# Patient Record
Sex: FEMALE | Race: WHITE | NOT HISPANIC OR LATINO | Employment: PART TIME | URBAN - METROPOLITAN AREA
[De-identification: names, ages, dates, MRNs, and addresses within clinical notes are randomized per-mention and may not be internally consistent; named-entity substitution may affect disease eponyms.]

---

## 2017-01-03 ENCOUNTER — HOSPITAL ENCOUNTER (OUTPATIENT)
Dept: PHYSICAL THERAPY | Facility: HOSPITAL | Age: 22
Setting detail: THERAPIES SERIES
Discharge: HOME | End: 2017-01-03

## 2017-01-03 DIAGNOSIS — R26.9 ABNORMAL GAIT: Primary | ICD-10-CM

## 2017-01-03 PROCEDURE — 97112 NEUROMUSCULAR REEDUCATION: CPT

## 2017-01-03 NOTE — PROGRESS NOTES
Outpatient Physical Therapy Neuro Treatment Note  [facility]     Patient Name: Juli Luna  : 1995  MRN: [id number]  Today's Date: 1/3/2017      Visit Date: 2017    Visit Dx:    ICD-10-CM ICD-9-CM   1. 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 Ramirez about driving at my next appointment.  -EF      Precautions and Contraindications    Precautions/Limitations fall precautions;swallowing precautions  -EF      Precautions falls, PEG  -EF      Subjective Pain    Able to rate subjective pain? yes  -EF      Pre-Treatment Pain Level 0  -EF      Post-Treatment Pain Level 0  -EF      Transfers    Transfers, Sit-Stand [place] conditional independence  -EF      Transfers, Stand-Sit [place] conditional independence  -EF      Gait Assessment/Treatment    Gait, [place] Level conditional independence  -EF      Gait, Distance (Feet) 200   x 1, 80 x 2  -EF      Gait, Gait Deviations ataxia   decreased trunk rotation, decreased arm swing  -EF      Gait, Safety Issues weight-shifting ability decreased  -EF      Gait, Impairments strength decreased;impaired balance;coordination impaired;motor control impaired  -EF      Balance Skills Training    Standing-Level of Assistance Close supervision  -EF      Static Standing Balance Support Left upper extremity supported;oscar bar  -EF      Standing-Balance Activities Single Limb Stance;Tandem Stance  -EF      Gait Balance-Level of Assistance Close supervision;Contact guard  -EF      Gait Balance Support No upper extremity supported  -EF      Gait Balance Activities  backwards;scanning environment R/L;side-stepping;stepping over object;tandem   crossovers/braiding with cga with UE support  -EF        User Key  (r) = Recorded By, (t) = Taken By, (c) = Cosigned By    Initials Name Provider Type    REZA Marshall, PT Physical Therapist                        PT Assessment/Plan       01/03/17 1559          PT Assessment    Assessment Comments Pt demonstrates fewer ataxic movements & continues to be challenged by increasingly difficult balance and coordination exercises.  -EF      PT Plan    PT Frequency 1x/week;2x/week  -EF      PT Plan Comments Continue with Plan of Care.  -EF        User Key  (r) = Recorded By, (t) = Taken By, (c) = Cosigned By    Initials Name Provider Type    REZA Marshall, PT Physical Therapist                     Exercises       01/03/17 3286          Subjective Comments    Subjective Comments I am going to ask Dr. Ramirez about driving at my next appointment.  -EF      Subjective Pain    Able to rate subjective pain? yes  -EF      Pre-Treatment Pain Level 0  -EF      Post-Treatment Pain Level 0  -EF      Exercise 1    Exercise Name 1 squat walking ~160'  -EF      Exercise 2    Exercise Name 2 step ups on curb with UE support  -EF      Reps 2 20  -EF      Exercise 7    Exercise Name 7 alt touches on step with sba  -EF      Reps 7 20  -EF      Exercise 8    Exercise Name 8 tandem walking at hemibars with sba  -EF      Exercise 10    Exercise Name 10 Jumps in squat position with cga/sba  -EF      Reps 10 15  -EF      Exercise 11    Exercise Name 11 dribbling green ball wile walking & switching hands  -EF      Reps 11 2   laps around gym with sba  -EF      Exercise 12    Exercise Name 12 Walking on heels & toes at hemibars with sba  -EF      Reps 12 --   2 laps each  -EF      Exercise 13    Exercise Name 13 alt fwd/back jumps (ant/post jumping jacks) with cg/sba  -EF      Reps 13 10  -EF      Exercise 16    Exercise Name 16 slow marching ~80' with  sba  -EF      Exercise 17    Exercise Name 17 12 o'clock, 3, 6 touches with each foot at hemibars  -EF      Exercise 18    Exercise Name 18 ball toss/catch against wall,same while stepping to L & R  -EF      Reps 18 25  -EF        User Key  (r) = Recorded By, (t) = Taken By, (c) = Cosigned By    Initials Name Provider Type    REZA Marshall PT Physical Therapist                   Balance Exercises (last 24 hours)      Balance Exercises       01/03/17 1532          Lunges    Activity Forward  -EF      Sets 5  -EF        User Key  (r) = Recorded By, (t) = Taken By, (c) = Cosigned By    Initials Name Provider Type    REZA Marshall PT Physical Therapist                        Therapy Education       01/03/17 1559    Therapy Education    Given Posture/body mechanics;Fall prevention and home safety;Mobility training  -EF    Program Progressed  -EF    How Provided Verbal;Demonstration  -EF    Provided to Patient  -EF    Level of Understanding Teach back education performed;Verbalized;Demonstrated  -EF      User Key  (r) = Recorded By, (t) = Taken By, (c) = Cosigned By    Initials Name Provider Type    REZA Marshall PT Physical Therapist                Time Calculation:   Start Time: 1015  Stop Time: 1100  Time Calculation (min): 45 min     Therapy Charges for Today     Code Description Service Date Service Provider Modifiers Qty    [id number]  PT NEUROMUSC RE EDUCATION EA 15 MIN 1/3/2017 Shanna Marshall, PT GP 3                    Shanna Marshall PT  1/3/2017

## 2017-01-05 ENCOUNTER — HOSPITAL ENCOUNTER (OUTPATIENT)
Dept: OCCUPATIONAL THERAPY | Facility: HOSPITAL | Age: 22
Setting detail: THERAPIES SERIES
Discharge: HOME | End: 2017-01-05

## 2017-01-05 ENCOUNTER — HOSPITAL ENCOUNTER (OUTPATIENT)
Dept: SPEECH THERAPY | Facility: HOSPITAL | Age: 22
Setting detail: THERAPIES SERIES
Discharge: HOME | End: 2017-01-05

## 2017-01-05 DIAGNOSIS — R47.1 DYSARTHRIA: ICD-10-CM

## 2017-01-05 DIAGNOSIS — R27.8 COORDINATION IMPAIRMENTS: Primary | ICD-10-CM

## 2017-01-05 DIAGNOSIS — R53.1 DECREASED STRENGTH: ICD-10-CM

## 2017-01-05 DIAGNOSIS — R13.12 OROPHARYNGEAL DYSPHAGIA: Primary | ICD-10-CM

## 2017-01-05 DIAGNOSIS — Z86.79 HISTORY OF INTRACRANIAL HEMORRHAGE: ICD-10-CM

## 2017-01-05 PROCEDURE — 92526 ORAL FUNCTION THERAPY: CPT

## 2017-01-05 PROCEDURE — 97110 THERAPEUTIC EXERCISES: CPT | Performed by: OCCUPATIONAL THERAPIST

## 2017-01-05 PROCEDURE — 92507 TX SP LANG VOICE COMM INDIV: CPT

## 2017-01-05 NOTE — PROGRESS NOTES
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 Oropharyngeal dysphagia R13.12 787.22   2. Dysarthria R47.1 784.51           SLP OP Goals       17 1005       Subjective Comments Juli is highly motivated to participate in therapy.   -HS       Able to rate subjective pain? yes  -HS    Pre-Treatment Pain Level 0  -HS    Post-Treatment Pain Level 0  -HS       Patient will apply compensatory strategies to improve intelligibility of speech during in spontaneous speech 90%:;without cues  -HS    Status: Patient will apply compensatory strategies to improve intelligibility of speech during in spontaneous speech Progressing as expected   Partially met  -HS    Comments: Patient will apply compensatory strategies to improve intelligibility of speech during in spontaneous speech 90% without cues during simple conversational speech. Better rate control this session. Goal criteria met x1 session. Will continue goal for carryover/generalization.  -HS       Dysphagia LTG's     Status: Patient will maintain nutrition/hydration via alternative means Achieved  -HS    Comments: Patient will maintain nutrition/hydration via alternative means Achieved 16.   -HS    Pt will tolerate some PO diet w/o aspiration to adequately meet nutrition/hydration needs Pureed/Finely Chopped Foods with Honey Thick Liquids  -HS    Status: Pt will tolerate some PO diet w/o aspiration to adequately meet nutrition/hydration needs Progressing as expected  -HS     Comments: Pt will tolerate some PO diet w/o aspiration to adequately meet nutrition/hydration needs     Status: Patient will increase hydration by tolerating water between meals after oral care     Comments: Patient will increase hydration by tolerating water between meals after oral care     Dysphagia STG's     Patient will improve oral skills to enhance safety and increase eating efficiency by increasing accuracy of A-P tongue movements without cues   100%  -HS    Status: Patient will improve oral skills to enhance safety and increase eating efficiency by increasing accuracy of A-P tongue movements Progressing as expected  -HS    Comments: Patient will improve oral skills to enhance safety and increase eating efficiency by increasing accuracy of A-P tongue movements 95% without cues.  -HS    Patient will increase laryngeal elevation to reduce residue that might fall into airway by completing Mendelsohn maneuver;without cues   100%  -HS    Status: Patient will increase laryngeal elevation to reduce residue that might fall into airway by completing Progressing as expected  -HS    Comments: Patient will increase laryngeal elevation to reduce residue that might fall into airway by completing 90% on Mendelsohn maneuver without cues.   -HS    Patient will increase strength of tongue base and posterior pharyngeal walls to reduce residue that might fall into airway by completing Liza (tongue hold)   100%  -HS    Status: Patient will increase strength of tongue base and posterior pharyngeal walls to reduce residue that might fall into airway by completing Progressing as expected  -HS    Comments: Patient will increase strength of tongue base and posterior pharyngeal walls to reduce residue that might fall into airway by completing 95% on Liza without cues.  -HS    Status: Patient will increase superior/anterior movement of hyolaryngeal complex to reduce residue which may fall into airway by using the Expiratory Muscle  Strength Trainer Progressing as expected  -HS    Comments: Patient will increase superior/anterior movement of hyolaryngeal complex to reduce residue which may fall into airway by using the Expiratory Muscle Strength Trainer Patient completes via HEP with no complaints. Did not target during session this date.   -HS    Patient will increase tipping of arytenoids and closure at entrance to the airway to reduce penetration into the vestibule by completing super-supraglottic swallow;without cues   100%  -HS    Status: Patient will increase tipping of arytenoids and closure at entrance to the airway to reduce penetration into the vestibule by completing Progressing as expected  -HS    Comments: Patient will increase tipping of arytenoids and closure at entrance to the airway to reduce penetration into the vestibule by completing 90% without cues.   -HS    Patient will compensate for oral/pharyngeal deficits and reduce risks while eating by utilizing  compensatory strategies no straw;multiple swallows;without cues  -HS    Status: Patient will compensate for oral/pharyngeal deficits and reduce risks while eating by utilizing  compensatory strategies --   Did not complete PO trials this date.   -HS    Comments: Patient will compensate for oral/pharyngeal deficits and reduce risks while eating by utilizing  compensatory strategies Patient reports compliance with safe swallow strategies at home.   -HS       Other Adult Goal- 1 Patient will increase superior/anterior movement of the hyolaryngeal complex to improve swallow safety by triggering swallow at peak of amplitude on 100% of opportunities during neuromuscular electrical stimulation (NMES).  -HS    Status: Other Adult Goal- 1 Progressing as expected  -HS    Comments: Other Adult Goal- 1 Parameters: 50 Hz, 175 phase duration, 12 amps. Patient tolerated well for 30 minutes. Triggered swallow at peak of amplitude with 90% accuracy independently.     -HS      User Key  (r)  = Recorded By, (t) = Taken By, (c) = Cosigned By    Initials Name Provider Type    TRAY Bright MS CCC-SLP Speech and Language Pathologist              OP SLP Education       01/05/17 1005       Barriers to Learning No barriers identified  -HS    Education Provided --   Discussed PO trials at home. Patient is making appropriate choices for her trials.   -HS    Learning Motivation Strong  -HS    Learning Method Explanation  -HS    Teaching Response Verbalized understanding;Demonstrated understanding  -HS      User Key  (r) = Recorded By, (t) = Taken By, (c) = Cosigned By    Initials Name Effective Dates    TRAY Bright MS CCC-SLP 04/13/15 -               OP SLP Assessment/Plan - 01/05/17 1005     SLP Plan    Plan Comments Continue therapy.   -HS      User Key  (r) = Recorded By, (t) = Taken By, (c) = Cosigned By    Initials Name Provider Type    TRAY Bright MS CCC-SLP Speech and Language Pathologist        Time Calculation:   SLP Start Time: 0920 (Arrived late due to weather. )  SLP Stop Time: 1005  SLP Time Calculation (min): 45 min    Therapy Charges for Today     Code Description Service Date Service Provider Modifiers Qty    98947685474 HC ST TREATMENT SWALLOW 2 1/5/2017 [place] MS Kaila CCC-SLP 59, GN 1    [id number] HC ST TREATMENT SPEECH 1 1/5/2017 [place] Bright MS CCC-SLP 59, GN 1            [place] Bright MS CCC-SLP  1/5/2017

## 2017-01-05 NOTE — PROGRESS NOTES
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   • H/O craniotomy   • Episode of syncope   • Iron deficiency anemia due to chronic blood loss        Past Medical History   Diagnosis Date   • AVM (arteriovenous malformation)    • History of pneumonia         Past Surgical History   Procedure Laterality Date   • Endoscopy w/ peg tube placement N/A 9/3/2016     Procedure: ESOPHAGOGASTRODUODENOSCOPY WITH PERCUTANEOUS ENDOSCOPIC GASTROSTOMY TUBE INSERTION;  Surgeon: René Ritter MD;  Location: [facility] ENDOSCOPY;  Service:    • Craniotomy for tumor Left 2016     Procedure: Posterior fossa suboccipital craniotomy and removal of brain stem arteriovenous malformation;  Surgeon: Josias Regalado MD;  Location: [facility] OR;  Service:    • Tracheostomy N/A 2016     Procedure: TRACHEOSTOMY;  Surgeon: Chad Negron MD;  Location: [facility] OR;  Service:          Visit Dx:    ICD-10-CM ICD-9-CM   1. Coordination impairments R27.8 781.3   2. History of intracranial hemorrhage Z86.79 V12.59   3. Decreased strength M62.81 728.87                         OT Assessment/Plan       17 1438          OT Assessment    Assessment Comments Pt cont to increase endurance and strength for work related tasks to possibly return to work in a few months. She does demo some coordination impairments in the RUE with heavier overhead objects.  -SO        User Key  (r) = Recorded By, (t) = Taken By, (c) = Cosigned By    Initials Name Provider Type    SO Karen Gamble, OTR Occupational Therapist                    Therapy Education       17 3085    Therapy Education    Given Posture/body  mechanics;Fall prevention and home safety;Mobility training  -EF    Program Progressed  -EF    How Provided Verbal;Demonstration  -EF    Provided to Patient  -EF    Level of Understanding Teach back education performed;Verbalized;Demonstrated  -EF      User Key  (r) = Recorded By, (t) = Taken By, (c) = Cosigned By    Initials Name Provider Type    REZA Marshall, PT Physical Therapist                    OT Exercises       01/05/17 1400          Subjective Comments    Subjective Comments Pt reports that the sensation in her RUE from last visit has resolved. No new concerns.  -SO      Subjective Pain    Able to rate subjective pain? yes  -SO      Pre-Treatment Pain Level 0  -SO      Post-Treatment Pain Level 0  -SO      Exercise 1    Exercise Name 1 Pt performed work simulated task that involved intermittently carrying 20# accross room for 10 min. Pt was required to reach for 2-8# objects in high cabnients. Pt with no LOB and reports little fatigue. Also performed dynamic standing task that required overhead and low reaching tasks for 15 min.  -SO      Time (Minutes) 1 25  -SO      Exercise 2    Exercise Name 2 UE strengthening using 5# db. Lateral shoulder raise, front shoulder raise, and overhead shoulder press. Some decreased coordination with RUE with 5# DB overhead. Cues for slow controlled movement.  -SO      Weights/Plates 2 5  -SO      Sets 2 2  -SO      Reps 2 10  -SO        User Key  (r) = Recorded By, (t) = Taken By, (c) = Cosigned By    Initials Name Provider Type    SO RONEN Flores Occupational Therapist                    Time Calculation:   OT Start Time: 1012  OT Stop Time: 1101  OT Time Calculation (min): 49 min     Therapy Charges for Today     Code Description Service Date Service Provider Modifiers Qty    [id number]  OT THER PROC EA 15 MIN 1/5/2017 RONEN Flores GO 3                    RONEN Flores  1/5/2017

## 2017-01-10 ENCOUNTER — HOSPITAL ENCOUNTER (OUTPATIENT)
Dept: SPEECH THERAPY | Facility: HOSPITAL | Age: 22
Setting detail: THERAPIES SERIES
Discharge: HOME | End: 2017-01-10

## 2017-01-10 ENCOUNTER — HOSPITAL ENCOUNTER (OUTPATIENT)
Dept: PHYSICAL THERAPY | Facility: HOSPITAL | Age: 22
Setting detail: THERAPIES SERIES
Discharge: HOME | End: 2017-01-10

## 2017-01-10 DIAGNOSIS — R26.9 ABNORMAL GAIT: Primary | ICD-10-CM

## 2017-01-10 DIAGNOSIS — R47.1 DYSARTHRIA: ICD-10-CM

## 2017-01-10 DIAGNOSIS — R13.12 OROPHARYNGEAL DYSPHAGIA: Primary | ICD-10-CM

## 2017-01-10 PROCEDURE — 97112 NEUROMUSCULAR REEDUCATION: CPT

## 2017-01-10 PROCEDURE — 92526 ORAL FUNCTION THERAPY: CPT

## 2017-01-10 PROCEDURE — 92507 TX SP LANG VOICE COMM INDIV: CPT

## 2017-01-10 NOTE — PROGRESS NOTES
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 Oropharyngeal dysphagia R13.12 787.22   2. Dysarthria R47.1 784.51           SLP OP Goals       01/10/17 1000       Subjective Comments Juli was pleasant and cooperative. She is highly motivated to participate in therapy. She reports a follow up appointment with her MD on 17 and states that she is looking forward to asking him about driving.   -HS       Able to rate subjective pain? yes  -HS    Pre-Treatment Pain Level 0  -HS    Post-Treatment Pain Level 0  -HS       Patient will apply compensatory strategies to improve intelligibility of speech during in spontaneous speech 90%:;without cues  -HS    Status: Patient will apply compensatory strategies to improve intelligibility of speech during in spontaneous speech Progressing as expected  -HS    Comments: Patient will apply compensatory strategies to improve intelligibility of speech during in spontaneous speech 90% given intermittent cues during phrase/short sentence production with open vowels and nasal/non-nasal sounds utilizing exaggerated mouth opening, increased loudness, and decreased rate. Patient states that her brother has commented recently about improvements in her speech. She does not that unfamiliar listeners have some difficulty understanding her.   -HS       Status: Patient will maintain nutrition/hydration via alternative means     Comments: Patient will maintain nutrition/hydration via alternative means     Pt  will tolerate some PO diet w/o aspiration to adequately meet nutrition/hydration needs     Status: Pt will tolerate some PO diet w/o aspiration to adequately meet nutrition/hydration needs Progressing as expected  -HS    Comments: Pt will tolerate some PO diet w/o aspiration to adequately meet nutrition/hydration needs Patient has increased her PO intake to 3 meals per day (finely chopped soft solids with honey thick liquids). She uses her PEG for meds and water only at this time.   -HS    Status: Patient will increase hydration by tolerating water between meals after oral care Progressing as expected  -HS    Comments: Patient will increase hydration by tolerating water between meals after oral care Patient states that she has not been having ice chips at home. SLP encouraged the patient to continue with ice chips using the following precaution: wait 30 minutes after meals, complete oral care, take single/small ice chips one at a time.   -HS    Patient will improve oral skills to enhance safety and increase eating efficiency by increasing accuracy of A-P tongue movements without cues   100%  -HS    Status: Patient will improve oral skills to enhance safety and increase eating efficiency by increasing accuracy of A-P tongue movements Progressing as expected  -HS    Comments: Patient will improve oral skills to enhance safety and increase eating efficiency by increasing accuracy of A-P tongue movements 95% without cues.  -HS    Patient will increase laryngeal elevation to reduce residue that might fall into airway by completing Mendelsohn maneuver;without cues   100%  -HS    Status: Patient will increase laryngeal elevation to reduce residue that might fall into airway by completing Progressing as expected  -HS    Comments: Patient will increase laryngeal elevation to reduce residue that might fall into airway by completing 90% without cues.   -HS    Patient will increase strength of tongue base and posterior  pharyngeal walls to reduce residue that might fall into airway by completing Liza (tongue hold)   100%  -HS    Status: Patient will increase strength of tongue base and posterior pharyngeal walls to reduce residue that might fall into airway by completing Progressing as expected  -HS    Comments: Patient will increase strength of tongue base and posterior pharyngeal walls to reduce residue that might fall into airway by completing 90% without cues.   -HS    Status: Patient will increase superior/anterior movement of hyolaryngeal complex to reduce residue which may fall into airway by using the Expiratory Muscle Strength Trainer Progressing as expected  -HS    Comments: Patient will increase superior/anterior movement of hyolaryngeal complex to reduce residue which may fall into airway by using the Expiratory Muscle Strength Trainer Did not target during session. Patient reports consistent completion at home.   -HS    Patient will increase tipping of arytenoids and closure at entrance to the airway to reduce penetration into the vestibule by completing super-supraglottic swallow;without cues   100%  -HS    Status: Patient will increase tipping of arytenoids and closure at entrance to the airway to reduce penetration into the vestibule by completing Progressing as expected  -HS    Comments: Patient will increase tipping of arytenoids and closure at entrance to the airway to reduce penetration into the vestibule by completing 95% without cues.   -HS    Patient will compensate for oral/pharyngeal deficits and reduce risks while eating by utilizing  compensatory strategies no straw;multiple swallows;without cues  -HS    Status: Patient will compensate for oral/pharyngeal deficits and reduce risks while eating by utilizing  compensatory strategies Progressing as expected  -HS    Comments: Patient will compensate for oral/pharyngeal deficits and reduce risks while eating by utilizing  compensatory strategies No PO trials  given during today's therapy session. Patient did report one instance of increased temperature following PO intake last week (99.3), however she also states that she had a sinus headache that same day. SLP encouraged patient to continue to monitor her temperatures after PO intake and report back any trends in temperature spikes.   -HS       Other Adult Goal- 1 Patient will increase superior/anterior movement of the hyolaryngeal complex to improve swallow safety by triggering swallow at peak of amplitude on 100% of opportunities during neuromuscular electrical stimulation (NMES).  -HS    Status: Other Adult Goal- 1 Progressing as expected  -HS    Comments: Other Adult Goal- 1 Parameters: 50 Hz, 175 phase duration, 12 amps. Patient tolerated well for 30 minutes. Triggered swallow at peak of amplitude with 95% accuracy independently.     -HS      User Key  (r) = Recorded By, (t) = Taken By, (c) = Cosigned By    Initials Name Provider Type    HS Batool Bright MS CCC-SLP Speech and Language Pathologist              OP SLP Education       01/10/17 1000          Education    Barriers to Learning No barriers identified  -HS      Education Provided --   Home exercises to practice dysarthria strategies.   -HS      Assessed Learning needs;Learning motivation;Learning preferences;Learning readiness  -HS      Learning Motivation Strong  -HS      Learning Method Explanation;Demonstration;Written materials  -HS      Teaching Response Verbalized understanding;Demonstrated understanding  -HS        User Key  (r) = Recorded By, (t) = Taken By, (c) = Cosigned By    Initials Name Effective Dates    HS Batool Bright MS CCC-SLP 04/13/15 -               OP SLP Assessment/Plan - 01/10/17 1000     SLP Plan    Plan Comments Continue therapy. Anticipate repeat VFSS in approximately 2 weeks to re-assess swallow function and determine safety with PO ? possible upgrade.   -HS      User Key  (r) = Recorded By, (t) = Taken By, (c) = Cosigned  By    Initials Name Provider Type    TRAY Bright MS CCC-SLP Speech and Language Pathologist        Time Calculation:   SLP Start Time: 0915  SLP Stop Time: 1000  SLP Time Calculation (min): 45 min    Therapy Charges for Today     Code Description Service Date Service Provider Modifiers Qty    87224261477 HC ST TREATMENT SPEECH 2 1/10/2017 Batool Bright MS CCC-SLP 59, GN 1    08509974057 HC ST TREATMENT SWALLOW 1 1/10/2017 Batool Bright MS CCC-SLP 59, GN 1            Batool Bright MS CCC-SLP  1/10/2017

## 2017-01-10 NOTE — PROGRESS NOTES
Outpatient Physical Therapy Neuro Treatment Note  [facility]     Patient Name: Juli Luna  : 1995  MRN: [id number]  Today's Date: 1/10/2017      Visit Date: 01/10/2017    Visit Dx:    ICD-10-CM ICD-9-CM   1. 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  -EF      Precautions and Contraindications    Precautions/Limitations fall precautions;swallowing precautions  -EF      Precautions falls, PEG  -EF      Subjective Pain    Able to rate subjective pain? yes  -EF      Pre-Treatment Pain Level 0  -EF      Post-Treatment Pain Level 0  -EF      Transfers    Transfers, Sit-Stand [place] conditional independence  -EF      Transfers, Stand-Sit [place] conditional independence  -EF      Gait Assessment/Treatment    Gait, [place] Level conditional independence  -EF      Gait, Distance (Feet) 200   x 1, 80 x 2  -EF      Gait, Gait Deviations ataxia   decreased trunk rotation, decreased arm swing  -EF      Gait, Safety Issues weight-shifting ability decreased  -EF      Gait, Impairments strength decreased;impaired balance;coordination impaired;motor control impaired  -EF      Balance Skills Training    Standing-Level of Assistance Close supervision  -EF      Static Standing Balance Support Left upper extremity supported;oscar bar  -EF      Standing-Balance Activities Ball toss;Single Limb Stance;Tandem Stance  -EF      Gait Balance-Level of Assistance Close supervision;Contact guard  -EF      Gait Balance Support No upper extremity supported  -EF      Gait Balance Activities backwards;braiding /  front;side-stepping;tandem   crossovers & braiding with cga with UE support  -EF        User Key  (r) = Recorded By, (t) = Taken By, (c) = Cosigned By    Initials Name Provider Type    REZA Marshall, PT Physical Therapist                        PT Assessment/Plan       01/10/17 1238 01/03/17 1559       PT Assessment    Assessment Comments Pt is challenged by higher level balance/coordination exercises such as crossovers/braiding, hopping, & LE jumping jacks.  -EF Pt demonstrates fewer ataxic movements & continues to be challenged by increasingly difficult balance and coordination exercises.  -EF     PT Plan    PT Frequency 1x/week  -EF 1x/week;2x/week  -EF     PT Plan Comments Continue with Plan of Care.  -EF Continue with Plan of Care.  -EF       User Key  (r) = Recorded By, (t) = Taken By, (c) = Cosigned By    Initials Name Provider Type    REZA Marshall PT Physical Therapist                     Exercises       01/10/17 1200          Subjective Comments    Subjective Comments Doing well, no complaints.  -EF      Subjective Pain    Able to rate subjective pain? yes  -EF      Pre-Treatment Pain Level 0  -EF      Post-Treatment Pain Level 0  -EF      Exercise 1    Exercise Name 1 squat walking ~160'  -EF      Exercise 2    Exercise Name 2 step ups on green step w/ 1 pink section at hemibars fwd & sideways (sideways up & over)  -EF      Reps 2 20  -EF      Exercise 5    Exercise Name 5 balloon volleyball Indep  -EF      Exercise 10    Exercise Name 10 Jumps in squat position with cga/sba  -EF      Reps 10 15  -EF      Exercise 11    Exercise Name 11 dribbling green ball while walking & switching hands, changing direction  -EF      Reps 11 3   laps around gym  -EF      Exercise 12    Exercise Name 12 Walking on heels & toes at hemibars with sba  -EF      Reps 12 3   laps each  -EF      Exercise 13    Exercise Name 13 alt jumping jacks & fwd/back with LEs only with sba  -EF      Reps 13 10   each  -EF       Exercise 16    Exercise Name 16 slow marching ~120' with sba  -EF      Exercise 18    Exercise Name 18 ball toss/catch against wall,same while stepping to L & R  -EF      Reps 18 30  -EF        User Key  (r) = Recorded By, (t) = Taken By, (c) = Cosigned By    Initials Name Provider Type    REZA Marshall PT Physical Therapist                                  Therapy Education       01/10/17 1237    Therapy Education    Given Posture/body mechanics;Fall prevention and home safety;Mobility training  -EF    Program Reinforced  -EF    How Provided Verbal;Demonstration  -EF    Provided to Patient  -EF    Level of Understanding Teach back education performed;Verbalized;Demonstrated  -EF      01/03/17 1559    Therapy Education    Given Posture/body mechanics;Fall prevention and home safety;Mobility training  -EF    Program Progressed  -EF    How Provided Verbal;Demonstration  -EF    Provided to Patient  -EF    Level of Understanding Teach back education performed;Verbalized;Demonstrated  -EF      User Key  (r) = Recorded By, (t) = Taken By, (c) = Cosigned By    Initials Name Provider Type    REZA Marshall PT Physical Therapist                Time Calculation:   Start Time: 1015  Stop Time: 1100  Time Calculation (min): 45 min     Therapy Charges for Today     Code Description Service Date Service Provider Modifiers Qty    95156264914 HC PT NEUROMUSC RE EDUCATION EA 15 MIN 1/10/2017 Shanna Marshall, PT GP 3                    Shanna Marshall PT  1/10/2017

## 2017-01-12 ENCOUNTER — HOSPITAL ENCOUNTER (OUTPATIENT)
Dept: OCCUPATIONAL THERAPY | Facility: HOSPITAL | Age: 22
Setting detail: THERAPIES SERIES
Discharge: HOME | End: 2017-01-12

## 2017-01-12 ENCOUNTER — HOSPITAL ENCOUNTER (OUTPATIENT)
Dept: SPEECH THERAPY | Facility: HOSPITAL | Age: 22
Setting detail: THERAPIES SERIES
Discharge: HOME | End: 2017-01-12

## 2017-01-12 DIAGNOSIS — Z86.79 HISTORY OF INTRACRANIAL HEMORRHAGE: ICD-10-CM

## 2017-01-12 DIAGNOSIS — R53.1 DECREASED STRENGTH: ICD-10-CM

## 2017-01-12 DIAGNOSIS — R13.12 OROPHARYNGEAL DYSPHAGIA: Primary | ICD-10-CM

## 2017-01-12 DIAGNOSIS — R27.8 COORDINATION IMPAIRMENTS: Primary | ICD-10-CM

## 2017-01-12 DIAGNOSIS — R47.1 DYSARTHRIA: ICD-10-CM

## 2017-01-12 PROCEDURE — 97535 SELF CARE MNGMENT TRAINING: CPT | Performed by: OCCUPATIONAL THERAPIST

## 2017-01-12 PROCEDURE — 92526 ORAL FUNCTION THERAPY: CPT

## 2017-01-12 NOTE — PROGRESS NOTES
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   • H/O craniotomy   • Episode of syncope   • Iron deficiency anemia due to chronic blood loss        Past Medical History   Diagnosis Date   • AVM (arteriovenous malformation)    • History of pneumonia         Past Surgical History   Procedure Laterality Date   • Endoscopy w/ peg tube placement N/A 9/3/2016     Procedure: ESOPHAGOGASTRODUODENOSCOPY WITH PERCUTANEOUS ENDOSCOPIC GASTROSTOMY TUBE INSERTION;  Surgeon: René Ritter MD;  Location: [facility] ENDOSCOPY;  Service:    • Craniotomy for tumor Left 2016     Procedure: Posterior fossa suboccipital craniotomy and removal of brain stem arteriovenous malformation;  Surgeon: Josias Regalado MD;  Location: [facility] OR;  Service:    • Tracheostomy N/A 2016     Procedure: TRACHEOSTOMY;  Surgeon: Chad Negron MD;  Location: [facility] OR;  Service:          Visit Dx:    ICD-10-CM ICD-9-CM   1. Coordination impairments R27.8 781.3   2. History of intracranial hemorrhage Z86.79 V12.59   3. Decreased strength M62.81 728.87                         OT Assessment/Plan       17 1403 17 1438       OT Assessment    Functional Limitations Decreased safety during functional activities  -SO      Assessment Comments Pt performed cooking activity using stove top and knife with no safety or balance deficits. She did have some difficulty using RUE to peal potatoes.  -SO Pt cont to increase endurance and strength for work related tasks to possibly return to work in a few months. She does demo some coordination impairments in the RUE with heavier overhead objects.  -SO       User  Key  (r) = Recorded By, (t) = Taken By, (c) = Cosigned By    Initials Name Provider Type    SO RONEN Flores Occupational Therapist                    Therapy Education       01/12/17 1402    Therapy Education    Given --   No new HEP.  -SO      01/10/17 1237    Therapy Education    Given Posture/body mechanics;Fall prevention and home safety;Mobility training  -EF    Program Reinforced  -EF    How Provided Verbal;Demonstration  -EF    Provided to Patient  -EF    Level of Understanding Teach back education performed;Verbalized;Demonstrated  -EF      User Key  (r) = Recorded By, (t) = Taken By, (c) = Cosigned By    Initials Name Provider Type    EF Shanna Marshall, PT Physical Therapist    SO RONEN Flores Occupational Therapist                    OT Exercises       01/12/17 1400          Subjective Comments    Subjective Comments Pt reprots that she goes to the neurologist monday and will be finding out if she can drive or not.   -SO      Exercise 1    Exercise Name 1 Pt engaged in IADL cooking activity making mashed potatoes. Activity involved pealing, cutting, boiling and mixing potatoes. Pt able to carry heavy pot of water with no LOB and no issues using stove. Pt did have some difficulty with RUE grasp holding pealer, but able to use knife and safely took time to slice with no safety issues. Pt safe at this time to complete simple stove top cooking at home.   -SO        User Key  (r) = Recorded By, (t) = Taken By, (c) = Cosigned By    Initials Name Provider Type    SO RONEN Flores Occupational Therapist                    Time Calculation:   OT Start Time: 1017  OT Stop Time: 1100  OT Time Calculation (min): 43 min     Therapy Charges for Today     Code Description Service Date Service Provider Modifiers Qty    51948101021 HC OT SELF CARE/MGMT/TRAIN EA 15 MIN 1/12/2017 RONEN Flores GO 3                    RONEN Flores  1/12/2017

## 2017-01-17 ENCOUNTER — HOSPITAL ENCOUNTER (OUTPATIENT)
Dept: PHYSICAL THERAPY | Facility: HOSPITAL | Age: 22
Setting detail: THERAPIES SERIES
Discharge: HOME | End: 2017-01-17

## 2017-01-17 ENCOUNTER — HOSPITAL ENCOUNTER (OUTPATIENT)
Dept: SPEECH THERAPY | Facility: HOSPITAL | Age: 22
Setting detail: THERAPIES SERIES
Discharge: HOME | End: 2017-01-17

## 2017-01-17 DIAGNOSIS — R13.12 OROPHARYNGEAL DYSPHAGIA: Primary | ICD-10-CM

## 2017-01-17 DIAGNOSIS — R26.9 ABNORMAL GAIT: Primary | ICD-10-CM

## 2017-01-17 DIAGNOSIS — R47.1 DYSARTHRIA: ICD-10-CM

## 2017-01-17 PROCEDURE — 92526 ORAL FUNCTION THERAPY: CPT

## 2017-01-17 PROCEDURE — 97112 NEUROMUSCULAR REEDUCATION: CPT

## 2017-01-17 NOTE — PROGRESS NOTES
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   • H/O craniotomy   • Episode of syncope   • Iron deficiency anemia due to chronic blood loss        Past Medical History   Diagnosis Date   • AVM (arteriovenous malformation)    • History of pneumonia         Past Surgical History   Procedure Laterality Date   • Endoscopy w/ peg tube placement N/A 9/3/2016     Procedure: ESOPHAGOGASTRODUODENOSCOPY WITH PERCUTANEOUS ENDOSCOPIC GASTROSTOMY TUBE INSERTION;  Surgeon: René Ritter MD;  Location: [facility] ENDOSCOPY;  Service:    • Craniotomy for tumor Left 2016     Procedure: Posterior fossa suboccipital craniotomy and removal of brain stem arteriovenous malformation;  Surgeon: Josias Regalado MD;  Location: [facility] OR;  Service:    • Tracheostomy N/A 2016     Procedure: TRACHEOSTOMY;  Surgeon: Chad Negron MD;  Location: [facility] OR;  Service:          Visit Dx:     ICD-10-CM ICD-9-CM   1. Abnormal gait R26.9 781.2                     PT Neuro       17 1116          Subjective Comments    Subjective Comments I'm doing okay. I'm going to the gym with my brother Thursday.  -EF      Precautions and Contraindications    Precautions/Limitations fall precautions;swallowing precautions  -EF      Precautions falls, PEG  -EF      Subjective Pain    Able to rate subjective pain? yes  -EF      Pre-Treatment Pain Level 0  -EF      Post-Treatment Pain Level 0  -EF      Transfers    Transfers, Sit-Stand Oliver conditional independence  -EF      Transfers, Stand-Sit Oliver conditional independence  -EF      Gait Assessment/Treatment    Gait, Oliver Level  conditional independence  -EF      Gait, Distance (Feet) 600   x 1, 80 x 1  -EF      Gait, Gait Deviations ataxia   decreased trunk rotation, decreased armswing  -EF      Gait, Safety Issues weight-shifting ability decreased  -EF      Gait, Impairments strength decreased;impaired balance;coordination impaired;motor control impaired  -EF      Stairs Assessment/Treatment    Number of Stairs 12  -EF      Stairs, Handrail Location left side (ascending)  -EF      Stairs, Yell Level supervision required  -EF      Stairs, Technique Used step over step (ascending);step over step (descending)  -EF      Stairs, Safety Issues weight-shifting ability decreased  -EF      Stairs, Impairments strength decreased;impaired balance;coordination impaired;motor control impaired  -EF      Balance Skills Training    Gait Balance-Level of Assistance Close supervision;Contact guard  -EF      Gait Balance Support No upper extremity supported  -EF      Gait Balance Activities backwards;side-stepping   front/back crossovers & braiding with cga with UE support  -EF        User Key  (r) = Recorded By, (t) = Taken By, (c) = Cosigned By    Initials Name Provider Type    EF Shanna Marshall, PT Physical Therapist                        Therapy Education       01/17/17 1134    Therapy Education    Given Posture/body mechanics;Fall prevention and home safety;Mobility training  -EF    Program Reinforced  -EF    How Provided Verbal;Demonstration  -EF    Provided to Patient  -EF    Level of Understanding Teach back education performed;Verbalized;Demonstrated  -EF      01/12/17 1402    Therapy Education    Given --   No new HEP.  -SO      01/10/17 1237    Therapy Education    Given Posture/body mechanics;Fall prevention and home safety;Mobility training  -EF    Program Reinforced  -EF    How Provided Verbal;Demonstration  -EF    Provided to Patient  -EF    Level of Understanding Teach back education performed;Verbalized;Demonstrated  -EF       User Key  (r) = Recorded By, (t) = Taken By, (c) = Cosigned By    Initials Name Provider Type    EF Shanna aMrshall, PT Physical Therapist    EKATERINA Gamble, OTR Occupational Therapist                PT OP Goals       01/17/17 1147 01/17/17 1100       PT Short Term Goals    STG 1  Pt Independent with initial home exercise program.  -EF     STG 1 Progress  Met  -EF     STG 2  Pt to ambulate to therapy sessions independently without assisitve device.  -EF     STG 2 Progress  Met  -EF     STG 3  Pt to ambulate with improved trunk rotation/arm swing 50% of time.  -EF     STG 3 Progress  Partially Met;Ongoing  -EF     STG 4  Pt to perform high level balance exercises with cga.  -EF     STG 4 Progress  Met  -EF     STG 5  Pt to improve Dynamic Gait Index score to at least 18/24 to demonstrate improved gait and balance and decreased risk of falls.  -EF     STG 5 Progress  Met  -EF     Long Term Goals    LTG Date to Achieve  03/20/17  -EF     LTG 1  Pt Independent with advanced home program or community based exercise program to maintain/improve upon gains made in therapy.  -EF     LTG 1 Progress  Partially Met  -EF     LTG 2  Pt to ambulate in the community independently without an assistive device.  -EF     LTG 2 Progress  Met  -EF     LTG 3  Pt to ambulate without gait deviations.  -EF     LTG 3 Progress  Ongoing  -EF     LTG 4  Pt to perform high level balance exercises Independently/.  -EF     LTG 4 Progress  Ongoing  -EF     LTG 5  Pt to improve Dynamic Gait Index score to 24/24 to demonstrate improved balance & gait and decreased fall risk.  -EF     LTG 5 Progress  Ongoing  -EF     LTG 5 Progress Comments  18/24 on Jan. 17, 2017  -EF     LTG 6  Pt able to return to work and/or school on at least a modified basis (part-time).  -EF     LTG 6 Progress  Ongoing  -EF     LTG 7  Patient to improve Dynamic Gait Index score to 21/24  -EF     LTG 7 Progress  Ongoing  -EF     LTG 7 Progress Comments  18/24 on  Jan. 17  -EF     Time Calculation    PT Goal Re-Cert Due Date 02/16/17  -EF 01/19/17  -EF       User Key  (r) = Recorded By, (t) = Taken By, (c) = Cosigned By    Initials Name Provider Type    REZA Marshall, PT Physical Therapist                PT Assessment/Plan       01/17/17 1138 01/10/17 1238       PT Assessment    Functional Limitations Impaired gait;Limitations in community activities;Performance in work activities  -EF      Impairments Balance;Coordination;Gait;Muscle strength  -EF      Assessment Comments Patient demonstrated improved txfs, gait, balance and suleiman of gait as evidenced by improvement in TUG score.  Patient's score has decreased from 16 seconds on Nov. 22 to 13 seconds on Dec. 20, and was 10 seconds today.  She continues to perform increasingly challenging balance and coordination exercises with improvement.  She still has ataxic movements, but has shown improvement.  Her endurance continues to improve, as she continues to tolerate increasing challenging exercises without excessive fatigue at the end of therapy sessions.  She will continue to benefit from Outpatient Physical Therapy to work on gait, balance, strengthening, and coordination to work towards goals of returning to work and/or school.  -EF Pt is challenged by higher level balance/coordination exercises such as crossovers/braiding, hopping, & LE jumping jacks.  -EF     Rehab Potential Good  -EF      Patient/caregiver participated in establishment of treatment plan and goals Yes  -EF      Patient would benefit from skilled therapy intervention Yes  -EF      PT Plan    PT Frequency 1x/week;2x/week  -EF 1x/week  -EF     Predicted Duration of Therapy Intervention (days/wks) 2 months  -EF      Physical Therapy Interventions (Optional Details) balance training;gait training;home exercise program;patient/family education;neuromuscular re-education;motor coordination training;strengthening  -EF      PT Plan Comments Continue with  Plan of Care.  -EF Continue with Plan of Care.  -EF       User Key  (r) = Recorded By, (t) = Taken By, (c) = Cosigned By    Initials Name Provider Type    REZA Marshall, SOFY Physical Therapist                   Exercises       01/17/17 1116          Subjective Comments    Subjective Comments I'm doing okay. I'm going to the gym with my brother Thursday.  -EF      Subjective Pain    Able to rate subjective pain? yes  -EF      Pre-Treatment Pain Level 0  -EF      Post-Treatment Pain Level 0  -EF      Exercise 3    Exercise Name 3 tall kneeling, all 4's alt lifting opposite UE/LEs w/ cg/min assist  -EF      Reps 3 5   with 5 sec hold  -EF      Exercise 10    Exercise Name 10 Jumps in squat position with cga/sba  -EF      Reps 10 20  -EF      Exercise 11    Exercise Name 11 dribbling green ball while walking & switching hands, changing direction  -EF      Reps 11 4   laps around gym  -EF      Exercise 12    Exercise Name 12 Walking on heels & toes at [employer] with sba  -EF      Reps 12 3   each  -EF      Exercise 13    Exercise Name 13 alt jumping jacks & fwd/back with LEs only with sba at hemibars  -EF      Reps 13 10   each  -EF      Exercise 16    Exercise Name 16 slow marching in place  -EF      Reps 16 30  -EF      Exercise 18    Exercise Name 18 ball toss/catch against wall,same while stepping to L & R  -EF      Reps 18 30  -EF        User Key  (r) = Recorded By, (t) = Taken By, (c) = Cosigned By    Initials Name Provider Type    REZA Marshall, SOFY Physical Therapist                            Outcome Measures       01/17/17 1100          Dynamic Gait Index (DGI)    Gait Level Surface 2  -EF      Change in Gait Speed 2  -EF      Gait with Horizontal Head Turns 2  -EF      Gait with Vertical Head Turns 2  -EF      Gait and Pivot Turn 3  -EF      Step Over Obstacle 2  -EF      Step Around Obstacles 3  -EF      Steps 2  -EF      Dynamic Gait Index Score 18  -EF      Timed Up and Go (TUG)    TUG Test 1 10  seconds  -EF      Timed Up and Go Comments without assistive device  -EF      Functional Assessment    Outcome Measure Options Dynamic Gait Index;Timed Up and Go (TUG)  -EF        User Key  (r) = Recorded By, (t) = Taken By, (c) = Cosigned By    Initials Name Provider Type    EF Shanna Marshall, PT Physical Therapist          Time Calculation:   Start Time: 1015  Stop Time: 1100  Time Calculation (min): 45 min     Therapy Charges for Today     Code Description Service Date Service Provider Modifiers Qty    [id number]  PT NEUROMUSC RE EDUCATION EA 15 MIN 1/17/2017 Shanna Marshall, PT GP 3          PT G-Codes  Outcome Measure Options: Dynamic Gait Index, Timed Up and Go (TUG)         Shanna Marshall, PT  1/17/2017

## 2017-01-18 NOTE — PROGRESS NOTES
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 Oropharyngeal dysphagia R13.12 787.22   2. Dysarthria R47.1 784.51           SLP OP Goals       17 1000    Subjective Comments    Subjective Comments Juli is looking forward to repeating a video swallow study in hopes of upgrading her solids and liquids. She would like to be able to eat sushi again. She is highly motivated to participate in therapy.   -HS    Subjective Pain    Able to rate subjective pain? yes  -HS    Pre-Treatment Pain Level 0  -HS    Post-Treatment Pain Level 0  -HS    Dysarthria Goals    Patient will apply compensatory strategies to improve intelligibility of speech during in spontaneous speech 90%:;without cues  -HS    Status: Patient will apply compensatory strategies to improve intelligibility of speech during in spontaneous speech --   Did not target this session.  -HS    Dysphagia Goals    Patient will improve oral skills to enhance safety and increase eating efficiency by increasing accuracy of A-P tongue movements without cues   100%  -HS    Status: Patient will improve oral skills to enhance safety and increase eating efficiency by increasing accuracy of A-P tongue movements Progressing as expected   Partially Met  -HS    Comments: Patient will improve oral skills to enhance safety and increase eating efficiency by increasing accuracy of A-P tongue movements 100% without cues. Goal criteria met x2 sessions. Will continue goal x1 more session to establish  carryover/generalization.   -HS    Patient will increase laryngeal elevation to reduce residue that might fall into airway by completing Mendelsohn maneuver;without cues   100%  -HS    Status: Patient will increase laryngeal elevation to reduce residue that might fall into airway by completing Progressing as expected  -HS    Comments: Patient will increase laryngeal elevation to reduce residue that might fall into airway by completing 95% without cues.   -HS    Patient will increase strength of tongue base and posterior pharyngeal walls to reduce residue that might fall into airway by completing Liza (tongue hold)   100%  -HS    Status: Patient will increase strength of tongue base and posterior pharyngeal walls to reduce residue that might fall into airway by completing Progressing as expected  -HS    Comments: Patient will increase strength of tongue base and posterior pharyngeal walls to reduce residue that might fall into airway by completing 95% without cues.   -HS    Status: Patient will increase superior/anterior movement of hyolaryngeal complex to reduce residue which may fall into airway by using the Expiratory Muscle Strength Trainer --   Did not target this session.  -HS    Comments: Patient will increase superior/anterior movement of hyolaryngeal complex to reduce residue which may fall into airway by using the Expiratory Muscle Strength Trainer Patient did report that she found her EMST device and re-started her home program.   -HS    Patient will increase tipping of arytenoids and closure at entrance to the airway to reduce penetration into the vestibule by completing super-supraglottic swallow;without cues   100%  -HS    Status: Patient will increase tipping of arytenoids and closure at entrance to the airway to reduce penetration into the vestibule by completing Progressing as expected   Partially Met  -HS    Comments: Patient will increase tipping of arytenoids and closure at entrance to the  airway to reduce penetration into the vestibule by completing 100% without cues. Goal criteria met x1 session. Will continue goal for carryover/generalization.   -HS    Patient will compensate for oral/pharyngeal deficits and reduce risks while eating by utilizing  compensatory strategies no straw;multiple swallows;without cues  -HS    Status: Patient will compensate for oral/pharyngeal deficits and reduce risks while eating by utilizing  compensatory strategies --   Did not target this session.  -HS    Other Goals    Other Adult Goal- 1 Patient will increase superior/anterior movement of the hyolaryngeal complex to improve swallow safety by triggering swallow at peak of amplitude on 100% of opportunities during neuromuscular electrical stimulation (NMES).  -HS    Status: Other Adult Goal- 1 Progressing as expected  -HS    Comments: Other Adult Goal- 1 Parameters: 50 Hz, 175 phase duration, 12 amps. New electrodes this session. Patient tolerated well for 30 minutes. Triggered swallow at peak of amplitude with 95% accuracy without cues.     -HS    Time Calculation      User Key  (r) = Recorded By, (t) = Taken By, (c) = Cosigned By    Initials Name Provider Type    TRAY Batool Birght MS CCC-SLP Speech and Language Pathologist              OP SLP Education       01/17/17 1000          Education    Barriers to Learning No barriers identified  -HS      Education Provided --   EMST home program.   -HS      Learning Motivation Strong  -HS      Learning Method Explanation  -HS      Teaching Response Verbalized understanding  -HS        User Key  (r) = Recorded By, (t) = Taken By, (c) = Cosigned By    Initials Name Effective Dates    TRAY Bright MS CCC-SLP 04/13/15 -               OP SLP Assessment/Plan - 01/17/17 1000     SLP Plan    Plan Comments Continue therapy. Request order for repeat VFSS.   -HS      User Key  (r) = Recorded By, (t) = Taken By, (c) = Cosigned By    Initials Name Provider Type    TRAY Renae  MS IAN Bright-SLP Speech and Language Pathologist        Time Calculation:   SLP Start Time: 0920  SLP Stop Time: 1000  SLP Time Calculation (min): 40 min    Therapy Charges for Today     Code Description Service Date Service Provider Modifiers Qty    [id number] HC ST TREATMENT SWALLOW 3 1/17/2017 Batool Bright MS CCC-SLP 59, GN 1            Batool Bright MS CCC-SLP  1/17/2017

## 2017-01-19 ENCOUNTER — HOSPITAL ENCOUNTER (OUTPATIENT)
Dept: OCCUPATIONAL THERAPY | Facility: HOSPITAL | Age: 22
Setting detail: THERAPIES SERIES
Discharge: HOME | End: 2017-01-19

## 2017-01-19 ENCOUNTER — HOSPITAL ENCOUNTER (OUTPATIENT)
Dept: SPEECH THERAPY | Facility: HOSPITAL | Age: 22
Setting detail: THERAPIES SERIES
Discharge: HOME | End: 2017-01-19

## 2017-01-19 DIAGNOSIS — R53.1 DECREASED STRENGTH: ICD-10-CM

## 2017-01-19 DIAGNOSIS — Z86.79 HISTORY OF INTRACRANIAL HEMORRHAGE: ICD-10-CM

## 2017-01-19 DIAGNOSIS — R13.12 OROPHARYNGEAL DYSPHAGIA: Primary | ICD-10-CM

## 2017-01-19 DIAGNOSIS — R47.1 DYSARTHRIA: ICD-10-CM

## 2017-01-19 DIAGNOSIS — R27.8 COORDINATION IMPAIRMENTS: Primary | ICD-10-CM

## 2017-01-19 PROCEDURE — 92507 TX SP LANG VOICE COMM INDIV: CPT

## 2017-01-19 PROCEDURE — 97112 NEUROMUSCULAR REEDUCATION: CPT | Performed by: OCCUPATIONAL THERAPIST

## 2017-01-19 PROCEDURE — 97110 THERAPEUTIC EXERCISES: CPT | Performed by: OCCUPATIONAL THERAPIST

## 2017-01-19 PROCEDURE — 92526 ORAL FUNCTION THERAPY: CPT

## 2017-01-19 NOTE — PROGRESS NOTES
Outpatient Occupational Therapy Neuro Re-Evaluation  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   • H/O craniotomy   • Episode of syncope   • Iron deficiency anemia due to chronic blood loss        Past Medical History   Diagnosis Date   • AVM (arteriovenous malformation)    • History of pneumonia         Past Surgical History   Procedure Laterality Date   • Endoscopy w/ peg tube placement N/A 9/3/2016     Procedure: ESOPHAGOGASTRODUODENOSCOPY WITH PERCUTANEOUS ENDOSCOPIC GASTROSTOMY TUBE INSERTION;  Surgeon: René Ritter MD;  Location: [facility] ENDOSCOPY;  Service:    • Craniotomy for tumor Left 2016     Procedure: Posterior fossa suboccipital craniotomy and removal of brain stem arteriovenous malformation;  Surgeon: Josias Regalado MD;  Location: [facility] OR;  Service:    • Tracheostomy N/A 2016     Procedure: TRACHEOSTOMY;  Surgeon: Chad Negron MD;  Location: [facility] OR;  Service:          Visit Dx:      ICD-10-CM ICD-9-CM   1. Coordination impairments R27.8 781.3   2. History of intracranial hemorrhage Z86.79 V12.59   3. Decreased strength M62.81 728.87                 OT Neuro       17 1200          Subjective Comments    Subjective Comments Pt would like to continue OT. She reports that she feels as though she has made progress but that she cont to have problems with FMC activites at home and motor control during functional activites. She would also like to cont working on strengthening and endurance activities.  -SO      Subjective Pain    Able to rate subjective pain? yes  -SO      Pre-Treatment Pain Level 0  -SO      Post-Treatment Pain Level 0  -SO      Sensation    Light Touch Partial deficits  in the RUE  -SO      Sharp/Dull Partial deficits in the RUE  -SO      Additional Comments Reports RUE feeling cold  -SO      Coordination    Other Coordination Observations RUE ataxia increased this date, pt reports feeling more ataxic today and that some days are worse than others  -SO      Box and Blocks Left 70  -SO      Box and Blocks Right 52  -SO      9-Hole Peg Left 23  -SO      9-Hole Peg Right 36  -SO      ROM (Range of Motion)    General ROM no range of motion deficits identified  -SO      General ROM Detail RUE LUE WFL  -SO      MMT (Manual Muscle Testing)    General MMT Assessment upper extremity strength deficits identified  -SO      Upper Extremity    Upper Ext Manual Muscle Testing Detail LUE WFL 5/5; LUE shoulder 4+/5 all planes, elbow 4/5, forearm 4/5, wrist 4/5  -SO        User Key  (r) = Recorded By, (t) = Taken By, (c) = Cosigned By    Initials Name Provider Type    RONEN Beach Occupational Therapist             Hand Therapy (last 24 hours)      Hand Eval       01/19/17 1200           Strength Right    # Reps 3  -SO      Right Rung 2  -SO      Right  Test 1 28  -SO      Right  Test 2 28  -SO      Right  Test 3 27  -SO       Strength Average Right 27.67  -SO       Strength Left    # Reps 3  -SO      Left Rung 2  -SO      Left  Test 1 31  -SO      Left  Test 2 29  -SO      Left  Test 3 28  -SO       Strength Average Left 29.33  -SO      Right Hand Strength - Pinch (lbs)    Lateral 9 lbs  -SO      Tip (2 point) 4 lbs  -SO      Left Hand Strength - Pinch (lbs)    Lateral 9 lbs  -SO      Tip (2 point) 4.3 lbs  -SO        User Key  (r) = Recorded By, (t) = Taken By, (c) = Cosigned By    Initials Name Provider Type    RONEN Beach Occupational Therapist                    Therapy Education       01/17/17 1136    Therapy Education    Given Posture/body mechanics;Fall prevention and home safety;Mobility training  -EF    Program  Reinforced  -EF    How Provided Verbal;Demonstration  -EF    Provided to Patient  -EF    Level of Understanding Teach back education performed;Verbalized;Demonstrated  -EF      User Key  (r) = Recorded By, (t) = Taken By, (c) = Cosigned By    Initials Name Provider Type    EF Shanna Marshall, PT Physical Therapist                OT Goals       01/19/17 1417 01/19/17 1200       OT Short Term Goals    STG Date to Achieve  02/16/17  -SO     STG 1  Pt. to be (I) with strengthening HEP.  -SO     STG 1 Progress  Ongoing  -SO     STG 2  Pt. and family to be independent with UE [facility] HEP.  -SO     STG 2 Progress  Met  -SO     STG 3  Patient will reach to right and left sides in standing with moderate to maximum challenge without loss of balance.  -SO     STG 3 Progress  Ongoing  -SO     STG 4  Patient and family to be independent with gross motor coordination home exercise program.  -SO     STG 4 Progress  Met  -SO     STG 5  Patient will be instructed to RUE typing drills to work on fine motor coordination to type an e-mail using backspace key and mouse with RUE hand.  -SO     STG 5 Progress  Ongoing  -SO     Long Term Goals    LTG Date to Achieve  02/16/17  -SO     LTG 1  Pt to improve R  strength to 30# to increase independence.   -SO     LTG 1 Progress  Ongoing  -SO     LTG 2  Patient will improve dynamic standing balance to mod I with challenging task.  -SO     LTG 2 Progress  Ongoing  -SO     LTG 3  Patient will be able to complete laundry leval of assistance.  -SO     LTG 3 Progress  Ongoing  -SO     LTG 5  Patient will be at Mod I level with simple meal preparation.  -SO     LTG 5 Progress  Met  -SO     LTG 6  Patient to improve 9 hole peg test score to 25 seconds on affected side to demonstrate increased independence with fine motor tasks.  -SO     LTG 6 Progress  Ongoing  -SO     LTG 7  Pt. to improve Box and Blocks score to 62 blocks on affected side to demonstrate increased independence with daily tasks.   -SO     LTG 7 Progress  Ongoing  -SO     LTG 8  Pt to carry 30# object from various heights with no LOB to simulate work related task without AD.   -SO     LTG 8 Progress  Ongoing  -SO     Time Calculation    OT Goal Re-Cert Due Date 02/16/17  -SO 02/16/17  -SO       User Key  (r) = Recorded By, (t) = Taken By, (c) = Cosigned By    Initials Name Provider Type    SO Karen Gamble OTR Occupational Therapist                OT Assessment/Plan       01/19/17 1219 01/17/17 1138       OT Assessment    Functional Limitations Performance in leisure activities;Performance in work activities;Limitations in community activities;Limitation in home management  -SO      Impairments Coordination;Dexterity;Muscle strength  -SO      Assessment Comments Pt has made improvements in RUE GMC/FMC but this date has show slightly lower scores on  and pinch strenghts in bilateral hands. She has shown improvements in RUE strenght but still has slight deficits in forearm. This date she has shown an increase in RUE ataxia and difficulty with in hand manipulation  -SO      Please refer to paper survey for additional self-reported information No  -SO      OT Rehab Potential Good  -SO      Patient/caregiver participated in establishment of treatment plan and goals Yes  -SO      Patient would benefit from skilled therapy intervention Yes  -SO      OT Plan    OT Frequency 1x/week  -SO      Predicted Duration of Therapy Intervention (days/wks) 1x/week for 1 month  -SO 2 months  -EF     Planned Therapy Interventions (Optional Details) home exercise program;motor coordination training;neuromuscular re-education;strengthening;ROM (Range of Motion)  -SO        User Key  (r) = Recorded By, (t) = Taken By, (c) = Cosigned By    Initials Name Provider Type    EF Shanna Marshall, PT Physical Therapist    SO Karen Gamble OTR Occupational Therapist                OT Exercises       01/19/17 1200          Exercise 1    Exercise Name 1  Pt performed grooved pegboard activity. Pt required to  3 pins in hand and work 1 out at a time to fingertips. Pt did have increased difficulty translating object in hand to fingertips while holding others in palm. Ataxia present, placed 1.5# wrist weight to wrist to increase accuracy.  -SO        User Key  (r) = Recorded By, (t) = Taken By, (c) = Cosigned By    Initials Name Provider Type    SO RONEN Flores Occupational Therapist                    Outcome Measures       01/19/17 1200 01/17/17 1100       DASH    Open a tight or new jar. 2  -SO      Write 2  -SO      Turn a key 2  -SO      Prepare a meal 1  -SO      Push open a heavy door 2  -SO      Place an object on a shelf above your head 2  -SO      Do heavy household chores (e.g., wash walls, wash floors) 2  -SO      Garden or do yard work 2  -SO      Make a bed 2  -SO      Carry a shopping bag or briefcase 2  -SO      Carry a heavy object (over 10 lbs) 2  -SO      Change a lightbulb overhead 2  -SO      Wash or blow dry your hair 2  -SO      Wash your back 1  -SO      Put on a pullover sweater 2  -SO      Use a knife to cut food 1  -SO      Recreational activities in which require little effort (e.g., cardplaying, knitting, etc.) 1  -SO      Recreational activities in which you take some force or impact through your arm, should or hand (e.g. golf, hammering, tennis, etc.) 1  -SO      Recreational Activities in which you move your arm freely (e.g., frisbee, badminton, etc.) 2  -SO      Manage transportation needs (getting from one place to another) 2  -SO      During the past week, to what extent has your arm, shoulder, or hand problem interfered with your normal social activites with family, friends, neighbors or groups? 2  -SO      During the past week, were you limited in your work or other regular daily activities as a result of your arm, shoulder or hand problem? 2  -SO      Arm, Shoulder, or hand pain 1  -SO      Arm, shoulder or  hand pain when you performed any specific activity 2  -SO      Tingling (pins and needles) in your arm, shoulder, or hand 2  -SO      Weakness in your arm, shoulder or hand 2  -SO      Stiffness in your arm, shoulder or hand 2  -SO      During the past week, how much difficulty have you had sleeping because of the pain in your arm, shoulder or hand? 1  -SO      I feel less capable, less confident or less useful because of my arm, shoulder or hand problem 3  -SO      DASH Sum  52  -SO      Number of Questions Answered 29  -SO      DASH Score 19.83  -SO      Dynamic Gait Index (DGI)    Gait Level Surface  2  -EF     Change in Gait Speed  2  -EF     Gait with Horizontal Head Turns  2  -EF     Gait with Vertical Head Turns  2  -EF     Gait and Pivot Turn  3  -EF     Step Over Obstacle  2  -EF     Step Around Obstacles  3  -EF     Steps  2  -EF     Dynamic Gait Index Score  18  -EF     Timed Up and Go (TUG)    TUG Test 1  10 seconds  -EF     Timed Up and Go Comments  without assistive device  -EF     Functional Assessment    Outcome Measure Options Disabilities of the Arm, Shoulder, and Hand (DASH)  -SO Dynamic Gait Index;Timed Up and Go (TUG)  -EF       User Key  (r) = Recorded By, (t) = Taken By, (c) = Cosigned By    Initials Name Provider Type    EF Shanna Marshall, PT Physical Therapist    SO Karen Gamble OTR Occupational Therapist            Time Calculation:   OT Start Time: 1017  OT Stop Time: 1100  OT Time Calculation (min): 43 min     Therapy Charges for Today     Code Description Service Date Service Provider Modifiers Qty    83368360408  OT NEUROMUSC RE EDUCATION EA 15 MIN 1/19/2017 RONEN Flores GO 2    [id number]  OT THER PROC EA 15 MIN 1/19/2017 RONEN Flores GO 1                     RONEN Flores  1/19/2017

## 2017-01-19 NOTE — PROGRESS NOTES
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   • H/O craniotomy   • Episode of syncope   • Iron deficiency anemia due to chronic blood loss        Past Medical History   Diagnosis Date   • AVM (arteriovenous malformation)    • History of pneumonia         Past Surgical History   Procedure Laterality Date   • Endoscopy w/ peg tube placement N/A 9/3/2016     Procedure: ESOPHAGOGASTRODUODENOSCOPY WITH PERCUTANEOUS ENDOSCOPIC GASTROSTOMY TUBE INSERTION;  Surgeon: René Ritter MD;  Location: [facility] ENDOSCOPY;  Service:    • Craniotomy for tumor Left 2016     Procedure: Posterior fossa suboccipital craniotomy and removal of brain stem arteriovenous malformation;  Surgeon: Josias Regalado MD;  Location: [facility] OR;  Service:    • Tracheostomy N/A 2016     Procedure: TRACHEOSTOMY;  Surgeon: Chad Negron MD;  Location: [facility] OR;  Service:          Visit Dx:    ICD-10-CM ICD-9-CM   1. Coordination impairments R27.8 781.3   2. History of intracranial hemorrhage Z86.79 V12.59   3. Decreased strength M62.81 728.87              Hand Therapy (last 24 hours)      Hand Eval       17 1200           Strength Right    # Reps 3  -SO      Right Rung 2  -SO      Right  Test 1 28  -SO      Right  Test 2 28  -SO      Right  Test 3 27  -SO       Strength Average Right 27.67  -SO       Strength Left    # Reps 3  -SO      Left Rung 2  -SO      Left  Test 1 31  -SO      Left  Test 2 29  -SO      Left  Test 3 28  -SO       Strength Average Left 29.33  -SO      Right Hand Strength - Pinch (lbs)    Lateral 9 lbs  -SO      Tip (2 point) 4 lbs   -SO      Left Hand Strength - Pinch (lbs)    Lateral 9 lbs  -SO      Tip (2 point) 4.3 lbs  -SO        User Key  (r) = Recorded By, (t) = Taken By, (c) = Cosigned By    Initials Name Provider Type    RONEN Beach Occupational Therapist                OT Neuro       01/19/17 1200          Subjective Comments    Subjective Comments Pt would like to continue OT. She reports that she feels as though she has made progress but that she cont to have problems with FMC activites at home and motor control during functional activites. She would also like to cont working on strengthening and endurance activities.  -SO      Subjective Pain    Able to rate subjective pain? yes  -SO      Pre-Treatment Pain Level 0  -SO      Post-Treatment Pain Level 0  -SO      Sensation    Light Touch Partial deficits in the RUE  -SO      Sharp/Dull Partial deficits in the RUE  -SO      Additional Comments Reports RUE feeling cold  -SO      Coordination    Other Coordination Observations RUE ataxia increased this date, pt reports feeling more ataxic today and that some days are worse than others  -SO      Box and Blocks Left 70  -SO      Box and Blocks Right 52  -SO      9-Hole Peg Left 23  -SO      9-Hole Peg Right 36  -SO      ROM (Range of Motion)    General ROM no range of motion deficits identified  -SO      General ROM Detail RUE LUE WFL  -SO      MMT (Manual Muscle Testing)    General MMT Assessment upper extremity strength deficits identified  -SO      Upper Extremity    Upper Ext Manual Muscle Testing Detail LUE WFL 5/5; LUE shoulder 4+/5 all planes, elbow 4/5, forearm 4/5, wrist 4/5  -SO        User Key  (r) = Recorded By, (t) = Taken By, (c) = Cosigned By    Initials Name Provider Type    RONEN Beach Occupational Therapist                    Therapy Education       01/17/17 1134    Therapy Education    Given Posture/body mechanics;Fall prevention and home safety;Mobility training  -EF    Program  Reinforced  -EF    How Provided Verbal;Demonstration  -EF    Provided to Patient  -EF    Level of Understanding Teach back education performed;Verbalized;Demonstrated  -EF      User Key  (r) = Recorded By, (t) = Taken By, (c) = Cosigned By    Initials Name Provider Type    EF Shanna Marshall, PT Physical Therapist                    OT Goals       01/19/17 1417 01/19/17 1200       OT Short Term Goals    STG Date to Achieve  02/16/17  -SO     STG 1  Pt. to be (I) with strengthening HEP.  -SO     STG 1 Progress  Ongoing  -SO     STG 2  Pt. and family to be independent with UE [facility] HEP.  -SO     STG 2 Progress  Met  -SO     STG 3  Patient will reach to right and left sides in standing with moderate to maximum challenge without loss of balance.  -SO     STG 3 Progress  Ongoing  -SO     STG 4  Patient and family to be independent with gross motor coordination home exercise program.  -SO     STG 4 Progress  Met  -SO     STG 5  Patient will be instructed to RUE typing drills to work on fine motor coordination to type an e-mail using backspace key and mouse with RUE hand.  -SO     STG 5 Progress  Ongoing  -SO     Long Term Goals    LTG Date to Achieve  02/16/17  -SO     LTG 1  Pt to improve R  strength to 30# to increase independence.   -SO     LTG 1 Progress  Ongoing  -SO     LTG 2  Patient will improve dynamic standing balance to mod I with challenging task.  -SO     LTG 2 Progress  Ongoing  -SO     LTG 3  Patient will be able to complete laundry leval of assistance.  -SO     LTG 3 Progress  Ongoing  -SO     LTG 5  Patient will be at Mod I level with simple meal preparation.  -SO     LTG 5 Progress  Met  -SO     LTG 6  Patient to improve 9 hole peg test score to 25 seconds on affected side to demonstrate increased independence with fine motor tasks.  -SO     LTG 6 Progress  Ongoing  -SO     LTG 7  Pt. to improve Box and Blocks score to 62 blocks on affected side to demonstrate increased independence with daily tasks.   -SO     LTG 7 Progress  Ongoing  -SO     LTG 8  Pt to carry 30# object from various heights with no LOB to simulate work related task without AD.   -SO     LTG 8 Progress  Ongoing  -SO     Time Calculation    OT Goal Re-Cert Due Date 02/16/17  -SO 02/16/17  -SO       User Key  (r) = Recorded By, (t) = Taken By, (c) = Cosigned By    Initials Name Provider Type    SO Karen Gamble OTR Occupational Therapist                OT Assessment/Plan       01/19/17 1219 01/17/17 1138       OT Assessment    Functional Limitations Performance in leisure activities;Performance in work activities;Limitations in community activities;Limitation in home management  -SO      Impairments Coordination;Dexterity;Muscle strength  -SO      Assessment Comments Pt has made improvements in RUE GMC/FMC but this date has show slightly lower scores on  and pinch strenghts in bilateral hands. She has shown improvements in RUE strenght but still has slight deficits in forearm. This date she has shown an increase in RUE ataxia and difficulty with in hand manipulation  -SO      Please refer to paper survey for additional self-reported information No  -SO      OT Rehab Potential Good  -SO      Patient/caregiver participated in establishment of treatment plan and goals Yes  -SO      Patient would benefit from skilled therapy intervention Yes  -SO      OT Plan    OT Frequency 1x/week  -SO      Predicted Duration of Therapy Intervention (days/wks) 1x/week for 1 month  -SO 2 months  -EF     Planned Therapy Interventions (Optional Details) home exercise program;motor coordination training;neuromuscular re-education;strengthening;ROM (Range of Motion)  -SO        User Key  (r) = Recorded By, (t) = Taken By, (c) = Cosigned By    Initials Name Provider Type    EF Shanna Marshall, PT Physical Therapist    SO Karen Gamble OTR Occupational Therapist                OT Exercises       01/19/17 1200          Exercise 1    Exercise Name 1  Pt performed grooved pegboard activity. Pt required to  3 pins in hand and work 1 out at a time to fingertips. Pt did have increased difficulty translating object in hand to fingertips while holding others in palm. Ataxia present, placed 1.5# wrist weight to wrist to increase accuracy.  -SO        User Key  (r) = Recorded By, (t) = Taken By, (c) = Cosigned By    Initials Name Provider Type    SO RONEN Flores Occupational Therapist                Outcome Measures       01/19/17 1200 01/17/17 1100       DASH    Open a tight or new jar. 2  -SO      Write 2  -SO      Turn a key 2  -SO      Prepare a meal 1  -SO      Push open a heavy door 2  -SO      Place an object on a shelf above your head 2  -SO      Do heavy household chores (e.g., wash walls, wash floors) 2  -SO      Garden or do yard work 2  -SO      Make a bed 2  -SO      Carry a shopping bag or briefcase 2  -SO      Carry a heavy object (over 10 lbs) 2  -SO      Change a lightbulb overhead 2  -SO      Wash or blow dry your hair 2  -SO      Wash your back 1  -SO      Put on a pullover sweater 2  -SO      Use a knife to cut food 1  -SO      Recreational activities in which require little effort (e.g., cardplaying, knitting, etc.) 1  -SO      Recreational activities in which you take some force or impact through your arm, should or hand (e.g. golf, hammering, tennis, etc.) 1  -SO      Recreational Activities in which you move your arm freely (e.g., frisbee, badminton, etc.) 2  -SO      Manage transportation needs (getting from one place to another) 2  -SO      During the past week, to what extent has your arm, shoulder, or hand problem interfered with your normal social activites with family, friends, neighbors or groups? 2  -SO      During the past week, were you limited in your work or other regular daily activities as a result of your arm, shoulder or hand problem? 2  -SO      Arm, Shoulder, or hand pain 1  -SO      Arm, shoulder or hand  pain when you performed any specific activity 2  -SO      Tingling (pins and needles) in your arm, shoulder, or hand 2  -SO      Weakness in your arm, shoulder or hand 2  -SO      Stiffness in your arm, shoulder or hand 2  -SO      During the past week, how much difficulty have you had sleeping because of the pain in your arm, shoulder or hand? 1  -SO      I feel less capable, less confident or less useful because of my arm, shoulder or hand problem 3  -SO      DASH Sum  52  -SO      Number of Questions Answered 29  -SO      DASH Score 19.83  -SO      Dynamic Gait Index (DGI)    Gait Level Surface  2  -EF     Change in Gait Speed  2  -EF     Gait with Horizontal Head Turns  2  -EF     Gait with Vertical Head Turns  2  -EF     Gait and Pivot Turn  3  -EF     Step Over Obstacle  2  -EF     Step Around Obstacles  3  -EF     Steps  2  -EF     Dynamic Gait Index Score  18  -EF     Timed Up and Go (TUG)    TUG Test 1  10 seconds  -EF     Timed Up and Go Comments  without assistive device  -EF     Functional Assessment    Outcome Measure Options Disabilities of the Arm, Shoulder, and Hand (DASH)  -SO Dynamic Gait Index;Timed Up and Go (TUG)  -EF       User Key  (r) = Recorded By, (t) = Taken By, (c) = Cosigned By    Initials Name Provider Type    EF Shanna Marshall, PT Physical Therapist    SO Karen Gamble OTR Occupational Therapist            Time Calculation:   OT Start Time: 1017  OT Stop Time: 1100  OT Time Calculation (min): 43 min     Therapy Charges for Today     Code Description Service Date Service Provider Modifiers Qty    [id number]  OT NEUROMUSC RE EDUCATION EA 15 MIN 1/19/2017 RONEN Flores GO 2    [id number]  OT THER PROC EA 15 MIN 1/19/2017 RONEN Flores GO 1                 RONEN Flores  1/19/2017

## 2017-01-19 NOTE — PROGRESS NOTES
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 Oropharyngeal dysphagia R13.12 787.22   2. Dysarthria R47.1 784.51           SLP OP Goals       17 1000       Subjective Comments Juli is highly motivated to participate in therapy. She feels as though both her speech and swallow have been steadily improving.   -HS       Able to rate subjective pain? yes  -HS    Pre-Treatment Pain Level 0  -HS    Post-Treatment Pain Level 0  -HS        Dysarthria LTG's Patient will use verbal speech that is perceived as natural-sounding by familiar/unfamiliar listeners  -HS    Patient will use verbal speech that is perceived as natural-sounding  by familiar/unfamiliar listeners without cues  -HS    Status: Patient will use verbal speech that is perceived as natural-sounding  by familiar/unfamiliar listeners Progressing as expected  -HS    Comments: Patient will use verbal speech that is perceived as natural-sounding  by familiar/unfamiliar listeners Juli states that familiar listeners have no difficulty understanding her speech, however she occasionally has encountered difficulty when speaking to unfamiliar listeners. She requires intermittent cues to employ compensatory strategies during conversational speech.   -HS    Patient will apply compensatory strategies to improve intelligibility of speech during in spontaneous speech 90%:;without cues  -HS    Status: Patient will apply compensatory strategies to improve intelligibility of speech during in  spontaneous speech Progressing as expected  -HS    Comments: Patient will apply compensatory strategies to improve intelligibility of speech during in spontaneous speech 80% without cues during simple conversational speech (SLP as listener). Intelligibility increased to 90% given intermittent cues to exaggerate mouth opening, slow speech rate, and increase her loudness.   -HS       Status: Patient will maintain nutrition/hydration via alternative means Achieved  -HS    Comments: Patient will maintain nutrition/hydration via alternative means Achieved 12/22/16.   -HS    Pt will tolerate some PO diet w/o aspiration to adequately meet nutrition/hydration needs Pureed/Finely Chopped Foods with Honey Thick Liquids  -HS    Status: Pt will tolerate some PO diet w/o aspiration to adequately meet nutrition/hydration needs Progressing as expected  -HS    Comments: Pt will tolerate some PO diet w/o aspiration to adequately meet nutrition/hydration needs Patient has been tolerating daily meals of puree/finely chopped solids with honey thick liquids. She is hopeful for a possible diet upgrade on her repeat VFSS (scheduled for 1/26/17).   -HS    Status: Patient will increase hydration by tolerating water between meals after oral care Progressing as expected  -HS    Comments: Patient will increase hydration by tolerating water between meals after oral care Patient has been tolerating ice chips. She now consistently has ice chips after oral care following all safe swallow precautions.  -HS    Patient will improve oral skills to enhance safety and increase eating efficiency by increasing accuracy of A-P tongue movements without cues   100%  -HS    Status: Patient will improve oral skills to enhance safety and increase eating efficiency by increasing accuracy of A-P tongue movements Achieved  -HS    Comments: Patient will improve oral skills to enhance safety and increase eating efficiency by increasing accuracy of A-P tongue  movements 100% without cues. Goal criteria met x3 sessions.   -HS    Patient will increase laryngeal elevation to reduce residue that might fall into airway by completing Mendelsohn maneuver;without cues   100%  -HS    Status: Patient will increase laryngeal elevation to reduce residue that might fall into airway by completing Progressing as expected  -HS    Comments: Patient will increase laryngeal elevation to reduce residue that might fall into airway by completing 95% without cues.   -HS    Patient will increase strength of tongue base and posterior pharyngeal walls to reduce residue that might fall into airway by completing Liza (tongue hold)   100%  -HS    Status: Patient will increase strength of tongue base and posterior pharyngeal walls to reduce residue that might fall into airway by completing Progressing as expected   Partially Met  -HS    Comments: Patient will increase strength of tongue base and posterior pharyngeal walls to reduce residue that might fall into airway by completing 100% without cues. Goal criteria met x1 session. Will continue goal for carryover/generalization.   -HS    Status: Patient will increase superior/anterior movement of hyolaryngeal complex to reduce residue which may fall into airway by using the Expiratory Muscle Strength Trainer --   Transitioned to HEP   -HS    Comments: Patient will increase superior/anterior movement of hyolaryngeal complex to reduce residue which may fall into airway by using the Expiratory Muscle Strength Trainer Patient is independent with EMST. Transitioned to completion via home exercise program.   -HS    Patient will increase tipping of arytenoids and closure at entrance to the airway to reduce penetration into the vestibule by completing super-supraglottic swallow;without cues   100%  -HS    Status: Patient will increase tipping of arytenoids and closure at entrance to the airway to reduce penetration into the vestibule by completing Progressing  as expected   Partially Met  -HS    Comments: Patient will increase tipping of arytenoids and closure at entrance to the airway to reduce penetration into the vestibule by completing 100% without cues. Goal criteria met x2 sessions. Will continue goal x1 more session to establish carryover/generalization.   -HS    Patient will compensate for oral/pharyngeal deficits and reduce risks while eating by utilizing  compensatory strategies no straw;multiple swallows;without cues  -HS    Status: Patient will compensate for oral/pharyngeal deficits and reduce risks while eating by utilizing  compensatory strategies Achieved  -HS    Comments: Patient will compensate for oral/pharyngeal deficits and reduce risks while eating by utilizing  compensatory strategies Patient independent with her safe swallow strategies. Goal met.   -HS       Other Adult Goal- 1 Patient will increase superior/anterior movement of the hyolaryngeal complex to improve swallow safety by triggering swallow at peak of amplitude on 100% of opportunities during neuromuscular electrical stimulation (NMES).  -HS    Status: Other Adult Goal- 1 Progressing as expected   Partially Met  -HS    Comments: Other Adult Goal- 1 Parameters: 50 Hz, 175 phase duration, 12 amps. Patient tolerated well for 30 minutes. Triggered swallow at peak of amplitude with 100% accuracy without cues. Goal criteria met x1 session. Will continue goal for carryover/generalization.      -HS       SLP Goal Re-Cert Due Date 02/19/17  -HS      User Key  (r) = Recorded By, (t) = Taken By, (c) = Cosigned By    Initials Name Provider Type     Batool Bright MS CCC-SLP Speech and Language Pathologist              OP SLP Education       01/19/17 1000       Barriers to Learning No barriers identified  -    Education Provided --   Plan of Care update; scheduled repeat VFSS   -HS    Assessed Learning needs;Learning motivation;Learning preferences;Learning readiness  -    Learning Motivation  Strong  -HS    Learning Method Explanation  -HS    Teaching Response Verbalized understanding  -HS      User Key  (r) = Recorded By, (t) = Taken By, (c) = Cosigned By    Initials Name Effective Dates    HS Batool Bright MS CCC-SLP 04/13/15 -               OP SLP Assessment/Plan - 01/19/17 1000     SLP Assessment    Functional Problems Swallowing;Speech Language- Adult/Cognition  -HS    Impact on Function: Adult Speech Language/Cognition Other (comment0;Restrictions in personal and social life   Reduced speech intelligibility  -HS    Clinical Impression: Speech Language-Adult/Congnition Mild-Moderate:;Dysarthria- Flaccid  -HS    Impact on Function: Swallowing Risk of aspiration;Risk of pneumonia;Impact on social aspects of eating  -HS    Clinical Impression: Swallowing Moderate-Severe:;oropharyngeal phase dysphagia  -HS    Prognosis Good (comment)   Progress to date; highly motivated; good support system  -HS    Patient/caregiver participated in establishment of treatment plan and goals Yes  -HS    Patient would benefit from skilled therapy intervention Yes  -HS    SLP Plan    Frequency 2x/week  -HS    Duration 6-8 weeks   -HS    Planned CPT's? SLP MBS: 55608;SLP SWALLOW THERAPY: 90490;SLP INDIVIDUAL SPEECH THERAPY: 13037  -    Expected Duration Therapy Session (min) 45-60 minutes  -HS    Plan Comments Repeat VFSS scheduled for 01/26/17. Continue therapy plan of care.   -HS      User Key  (r) = Recorded By, (t) = Taken By, (c) = Cosigned By    Initials Name Provider Type    TRAY Bright MS CCC-SLP Speech and Language Pathologist           SLP Outcome Measures (last 72 hours)      SLP Outcome Measures       01/19/17 1000          SLP Outcome Measures    Outcome Measure Used? Adult NOMS  -HS      FCM Scores    FCM Chosen Swallowing;Motor Speech  -HS      Swallowing FCM Score 3  -HS      Motor Speech FCM Score 5  -HS        User Key  (r) = Recorded By, (t) = Taken By, (c) = Cosigned By    Initials Name  Effective Dates    HS Batool Bright MS CCC-SLP 04/13/15 -         Time Calculation:   SLP Start Time: 0915  SLP Stop Time: 1000  SLP Time Calculation (min): 45 min    Therapy Charges for Today     Code Description Service Date Service Provider Modifiers Qty    54735027334 HC ST TREATMENT SPEECH 1 1/19/2017 Batool Bright MS CCC-SLP 59, GN 1    25661928330 HC ST TREATMENT SWALLOW 3 1/19/2017 Batool Bright MS CCC-SLP 59, GN 1            Batool Bright MS CCC-SLP  1/19/2017

## 2017-01-24 ENCOUNTER — HOSPITAL ENCOUNTER (OUTPATIENT)
Dept: SPEECH THERAPY | Facility: HOSPITAL | Age: 22
Setting detail: THERAPIES SERIES
Discharge: HOME | End: 2017-01-24

## 2017-01-24 ENCOUNTER — HOSPITAL ENCOUNTER (OUTPATIENT)
Dept: PHYSICAL THERAPY | Facility: HOSPITAL | Age: 22
Setting detail: THERAPIES SERIES
Discharge: HOME | End: 2017-01-24

## 2017-01-24 DIAGNOSIS — R26.9 ABNORMAL GAIT: Primary | ICD-10-CM

## 2017-01-24 DIAGNOSIS — R13.12 OROPHARYNGEAL DYSPHAGIA: Primary | ICD-10-CM

## 2017-01-24 PROCEDURE — 97112 NEUROMUSCULAR REEDUCATION: CPT

## 2017-01-24 PROCEDURE — 92526 ORAL FUNCTION THERAPY: CPT

## 2017-01-24 NOTE — PROGRESS NOTES
Outpatient Physical Therapy Neuro Treatment Note  [facility]     Patient Name: Juli Luna  : 1995  MRN: [id number]  Today's Date: 2017      Visit Date: 2017    Visit Dx:    ICD-10-CM ICD-9-CM   1. 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  -EF      Precautions and Contraindications    Precautions/Limitations fall precautions;swallowing precautions  -EF      Precautions falls, PEG  -EF      Subjective Pain    Able to rate subjective pain? yes  -EF      Pre-Treatment Pain Level 0  -EF      Post-Treatment Pain Level 0  -EF      Transfers    Transfers, Sit-Stand [place] conditional independence;independent  -EF      Transfers, Stand-Sit [place] conditional independence;independent  -EF      Gait Assessment/Treatment    Gait, [place] Level conditional independence  -EF      Gait, Distance (Feet) 600   x 1, 200 x 1  -EF      Gait, Gait Deviations ataxia   decreased trunk rotation, decreased armswing  -EF      Balance Skills Training    Standing-Level of Assistance Close supervision  -EF      Static Standing Balance Support Left upper extremity supported;oscar bar  -EF      Standing-Balance Activities Single Limb Stance  -EF      Gait Balance-Level of Assistance Close supervision;Contact guard  -EF      Gait Balance Support No upper extremity supported  -EF      Gait Balance Activities backwards;side-stepping   front/back crossovers & braiding with UE support  -EF        User Key  (r) = Recorded By, (t) = Taken By, (c) = Cosigned By    Initials Name Provider Type    EF Shanna Marshall, PT  Physical Therapist                        PT Assessment/Plan       01/24/17 1300 01/19/17 1219       PT Assessment    Assessment Comments Pt is challenged by high level balance & coordination exercises such as crossovers, braiding, single limb stance, and jumping and requires either holding onto hemibar or therapist assistance when performing these activities.  -EF      PT Plan    PT Frequency 1x/week  -EF      Predicted Duration of Therapy Intervention (days/wks)  1x/week for 1 month  -SO     PT Plan Comments Continue with Plan of care.  -EF        User Key  (r) = Recorded By, (t) = Taken By, (c) = Cosigned By    Initials Name Provider Type    EF Shanna Masrhall, PT Physical Therapist    SO Karen Gamble, OTR Occupational Therapist                     Exercises       01/24/17 1128          Subjective Comments    Subjective Comments I'm doing fine.  -EF      Subjective Pain    Able to rate subjective pain? yes  -EF      Pre-Treatment Pain Level 0  -EF      Post-Treatment Pain Level 0  -EF      Exercise 2    Exercise Name 2 step ups on green step w/ 1 pink section at hemibars fwd & sideways (sideways up & over)  -EF      Reps 2 20  -EF      Exercise 3    Exercise Name 3 on all 4's, alt lifting each extremity slowly  -EF      Reps 3 10   reps for each  -EF      Exercise 4    Exercise Name 4 Resisted walking with pt pushing roller table & therapist resisitng (80' x 2).  -EF      Exercise 8    Exercise Name 8 tandem walking at hemibars with sba  -EF      Exercise 11    Exercise Name 11 dribbling green ball while walking & switching hands, changing directions  -EF      Reps 11 4   laps around gym  -EF      Exercise 12    Exercise Name 12 Walking on heels & toes at hemibars with sba  -EF      Reps 12 4   laps each  -EF      Exercise 13    Exercise Name 13 alt jumping jacks & fwd/back with LEs only with sba at hemibars  -EF      Sets 13 2  -EF      Reps 13 15  -EF        User Key  (r) = Recorded By, (t) = Taken  By, (c) = Cosigned By    Initials Name Provider Type    EF Shanna Marshall, PT Physical Therapist                              PT OP Goals       01/17/17 1147 01/17/17 1100       PT Short Term Goals    STG 1  Pt Independent with initial home exercise program.  -EF     STG 1 Progress  Met  -EF     STG 2  Pt to ambulate to therapy sessions independently without assisitve device.  -EF     STG 2 Progress  Met  -EF     STG 3  Pt to ambulate with improved trunk rotation/arm swing 50% of time.  -EF     STG 3 Progress  Partially Met;Ongoing  -EF     STG 4  Pt to perform high level balance exercises with cga.  -EF     STG 4 Progress  Met  -EF     STG 5  Pt to improve Dynamic Gait Index score to at least 18/24 to demonstrate improved gait and balance and decreased risk of falls.  -EF     STG 5 Progress  Met  -EF     Long Term Goals    LTG Date to Achieve  03/20/17  -EF     LTG 1  Pt Independent with advanced home program or community based exercise program to maintain/improve upon gains made in therapy.  -EF     LTG 1 Progress  Partially Met  -EF     LTG 2  Pt to ambulate in the community independently without an assistive device.  -EF     LTG 2 Progress  Met  -EF     LTG 3  Pt to ambulate without gait deviations.  -EF     LTG 3 Progress  Ongoing  -EF     LTG 4  Pt to perform high level balance exercises Independently/.  -EF     LTG 4 Progress  Ongoing  -EF     LTG 5  Pt to improve Dynamic Gait Index score to 24/24 to demonstrate improved balance & gait and decreased fall risk.  -EF     LTG 5 Progress  Ongoing  -EF     LTG 5 Progress Comments  18/24 on Jan. 17, 2017  -EF     LTG 6  Pt able to return to work and/or school on at least a modified basis (part-time).  -EF     LTG 6 Progress  Ongoing  -EF     LTG 7  Patient to improve Dynamic Gait Index score to 21/24  -EF     LTG 7 Progress  Ongoing  -EF     LTG 7 Progress Comments  18/24 on Jan. 17  -EF     Time Calculation    PT Goal Re-Cert Due Date 02/16/17  -EF 01/19/17   -EF       User Key  (r) = Recorded By, (t) = Taken By, (c) = Cosigned By    Initials Name Provider Type    REZA Marshall PT Physical Therapist                Therapy Education       01/24/17 1300    Therapy Education    Given Posture/body mechanics;Fall prevention and home safety;Mobility training  -EF    Program Progressed  -EF    How Provided Verbal;Demonstration  -EF    Provided to Patient  -EF    Level of Understanding Teach back education performed;Verbalized;Demonstrated  -EF      User Key  (r) = Recorded By, (t) = Taken By, (c) = Cosigned By    Initials Name Provider Type    REZA Marshall PT Physical Therapist                Time Calculation:   Start Time: 1021  Stop Time: 1108  Time Calculation (min): 47 min     Therapy Charges for Today     Code Description Service Date Service Provider Modifiers Qty    [id number]  PT NEUROMUSC RE EDUCATION EA 15 MIN 1/24/2017 Shanna Marshall, PT GP 3                    Shanna Marshall PT  1/24/2017

## 2017-01-24 NOTE — PROGRESS NOTES
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 Oropharyngeal dysphagia R13.12 787.22           SLP OP Goals       17 1009       Subjective Comments Juli was pleasant and cooperative. She is looking forward to her repeat VFSS this Thursday. She reports follow up with her MD yesterday who has referred her for a 's evaluation.   -HS       Able to rate subjective pain? yes  -HS    Pre-Treatment Pain Level 0  -HS    Post-Treatment Pain Level 0  -HS        Dysarthria LTG's     Patient will use verbal speech that is perceived as natural-sounding  by familiar/unfamiliar listeners     Status: Patient will use verbal speech that is perceived as natural-sounding  by familiar/unfamiliar listeners     Comments: Patient will use verbal speech that is perceived as natural-sounding  by familiar/unfamiliar listeners     Patient will apply compensatory strategies to improve intelligibility of speech during in spontaneous speech 90%:;without cues  -HS    Status: Patient will apply compensatory strategies to improve intelligibility of speech during in spontaneous speech --   Did not target this session.   -HS    Comments: Patient will apply compensatory strategies to improve intelligibility of speech during in spontaneous speech        Status: Patient will maintain nutrition/hydration via alternative means     Comments: Patient will maintain nutrition/hydration via alternative means     Pt will tolerate some PO diet w/o aspiration to adequately  meet nutrition/hydration needs     Status: Pt will tolerate some PO diet w/o aspiration to adequately meet nutrition/hydration needs     Comments: Pt will tolerate some PO diet w/o aspiration to adequately meet nutrition/hydration needs     Status: Patient will increase hydration by tolerating water between meals after oral care     Comments: Patient will increase hydration by tolerating water between meals after oral care     Patient will improve oral skills to enhance safety and increase eating efficiency by increasing accuracy of A-P tongue movements without cues   100%  -HS    Status: Patient will improve oral skills to enhance safety and increase eating efficiency by increasing accuracy of A-P tongue movements Achieved  -HS    Comments: Patient will improve oral skills to enhance safety and increase eating efficiency by increasing accuracy of A-P tongue movements Achieved 1/19/17.   -HS    Patient will increase laryngeal elevation to reduce residue that might fall into airway by completing Mendelsohn maneuver;without cues   100%  -HS    Status: Patient will increase laryngeal elevation to reduce residue that might fall into airway by completing Progressing as expected  -HS    Comments: Patient will increase laryngeal elevation to reduce residue that might fall into airway by completing 95% without cues.   -HS    Patient will increase strength of tongue base and posterior pharyngeal walls to reduce residue that might fall into airway by completing Liza (tongue hold)   100%  -HS    Status: Patient will increase strength of tongue base and posterior pharyngeal walls to reduce residue that might fall into airway by completing Progressing as expected   Partially Met  -HS    Comments: Patient will increase strength of tongue base and posterior pharyngeal walls to reduce residue that might fall into airway by completing 100% without cues. Goal criteria met x2 sessions. Will continue goal x1 more session for  carryover/generalization.   -HS    Status: Patient will increase superior/anterior movement of hyolaryngeal complex to reduce residue which may fall into airway by using the Expiratory Muscle Strength Trainer     Comments: Patient will increase superior/anterior movement of hyolaryngeal complex to reduce residue which may fall into airway by using the Expiratory Muscle Strength Trainer Patient completes via HEP.   -HS    Patient will increase tipping of arytenoids and closure at entrance to the airway to reduce penetration into the vestibule by completing super-supraglottic swallow;without cues   100%  -HS    Status: Patient will increase tipping of arytenoids and closure at entrance to the airway to reduce penetration into the vestibule by completing Achieved  -HS    Comments: Patient will increase tipping of arytenoids and closure at entrance to the airway to reduce penetration into the vestibule by completing 100% without cues. Goal criteria met x3 sessions.    -HS    Patient will compensate for oral/pharyngeal deficits and reduce risks while eating by utilizing  compensatory strategies no straw;multiple swallows;without cues  -HS    Status: Patient will compensate for oral/pharyngeal deficits and reduce risks while eating by utilizing  compensatory strategies Achieved  -HS    Comments: Patient will compensate for oral/pharyngeal deficits and reduce risks while eating by utilizing  compensatory strategies Achieved 01/19/17.   -HS       Other Adult Goal- 1 Patient will increase superior/anterior movement of the hyolaryngeal complex to improve swallow safety by triggering swallow at peak of amplitude on 100% of opportunities during neuromuscular electrical stimulation (NMES).  -HS    Status: Other Adult Goal- 1 Progressing as expected   Partially Met  -HS    Comments: Other Adult Goal- 1 Parameters: 50 Hz, 175 phase duration, 12 amps. Patient tolerated well for 30 minutes. Triggered swallow at peak of amplitude  with 100% accuracy without cues. Goal criteria met x2 sessions. Will continue goal for carryover/generalization.      -HS         User Key  (r) = Recorded By, (t) = Taken By, (c) = Cosigned By    Initials Name Provider Type    TRAY Batool Bright MS CCC-SLP Speech and Language Pathologist              OP SLP Education       01/24/17 1000       Barriers to Learning No barriers identified  -HS    Education Provided --   Repeat VFSS   -HS    Learning Motivation Strong  -HS    Learning Method Explanation  -HS    Teaching Response Verbalized understanding  -HS      User Key  (r) = Recorded By, (t) = Taken By, (c) = Cosigned By    Initials Name Effective Dates    TRAY Bright MS CCC-SLP 04/13/15 -               OP SLP Assessment/Plan - 01/24/17 1000     SLP Plan    Plan Comments Repeat VFSS scheduled for this Thursday. Further swallow POC pending VFSS results.   -HS      User Key  (r) = Recorded By, (t) = Taken By, (c) = Cosigned By    Initials Name Provider Type    TRAY Batool Bright MS CCC-SLP Speech and Language Pathologist        Time Calculation:   SLP Start Time: 0915  SLP Stop Time: 1000  SLP Time Calculation (min): 45 min    Therapy Charges for Today     Code Description Service Date Service Provider Modifiers Qty    41472836291 HC ST TREATMENT SWALLOW 3 1/24/2017 Batool Bright MS CCC-SLP GN 1            Batool Bright MS CCC-SLP  1/24/2017

## 2017-01-26 ENCOUNTER — APPOINTMENT (OUTPATIENT)
Dept: SPEECH THERAPY | Facility: HOSPITAL | Age: 22
End: 2017-01-26

## 2017-01-26 ENCOUNTER — HOSPITAL ENCOUNTER (OUTPATIENT)
Dept: GENERAL RADIOLOGY | Facility: HOSPITAL | Age: 22
Discharge: HOME | End: 2017-01-26
Attending: PHYSICAL MEDICINE & REHABILITATION | Admitting: PHYSICAL MEDICINE & REHABILITATION

## 2017-01-26 ENCOUNTER — HOSPITAL ENCOUNTER (OUTPATIENT)
Dept: OCCUPATIONAL THERAPY | Facility: HOSPITAL | Age: 22
Setting detail: THERAPIES SERIES
Discharge: HOME | End: 2017-01-26

## 2017-01-26 DIAGNOSIS — R27.8 COORDINATION IMPAIRMENTS: Primary | ICD-10-CM

## 2017-01-26 DIAGNOSIS — Z86.79 HISTORY OF INTRACRANIAL HEMORRHAGE: ICD-10-CM

## 2017-01-26 DIAGNOSIS — R53.1 DECREASED STRENGTH: ICD-10-CM

## 2017-01-26 DIAGNOSIS — R13.12 OROPHARYNGEAL DYSPHAGIA: ICD-10-CM

## 2017-01-26 PROCEDURE — 97110 THERAPEUTIC EXERCISES: CPT | Performed by: OCCUPATIONAL THERAPIST

## 2017-01-26 PROCEDURE — G8996 SWALLOW CURRENT STATUS: HCPCS | Performed by: SPEECH-LANGUAGE PATHOLOGIST

## 2017-01-26 PROCEDURE — G8997 SWALLOW GOAL STATUS: HCPCS | Performed by: SPEECH-LANGUAGE PATHOLOGIST

## 2017-01-26 PROCEDURE — 97112 NEUROMUSCULAR REEDUCATION: CPT | Performed by: OCCUPATIONAL THERAPIST

## 2017-01-26 PROCEDURE — 92610 EVALUATE SWALLOWING FUNCTION: CPT | Performed by: SPEECH-LANGUAGE PATHOLOGIST

## 2017-01-26 PROCEDURE — G8998 SWALLOW D/C STATUS: HCPCS | Performed by: SPEECH-LANGUAGE PATHOLOGIST

## 2017-01-26 PROCEDURE — 74230 X-RAY XM SWLNG FUNCJ C+: CPT

## 2017-01-26 NOTE — PROGRESS NOTES
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   • H/O craniotomy   • Episode of syncope   • Iron deficiency anemia due to chronic blood loss        Past Medical History   Diagnosis Date   • AVM (arteriovenous malformation)    • History of pneumonia         Past Surgical History   Procedure Laterality Date   • Endoscopy w/ peg tube placement N/A 9/3/2016     Procedure: ESOPHAGOGASTRODUODENOSCOPY WITH PERCUTANEOUS ENDOSCOPIC GASTROSTOMY TUBE INSERTION;  Surgeon: René Ritter MD;  Location: [facility] ENDOSCOPY;  Service:    • Craniotomy for tumor Left 2016     Procedure: Posterior fossa suboccipital craniotomy and removal of brain stem arteriovenous malformation;  Surgeon: Josias Regalado MD;  Location: [facility] OR;  Service:    • Tracheostomy N/A 2016     Procedure: TRACHEOSTOMY;  Surgeon: Chad Negron MD;  Location: [facility] OR;  Service:          Visit Dx:    ICD-10-CM ICD-9-CM   1. Coordination impairments R27.8 781.3   2. History of intracranial hemorrhage Z86.79 V12.59   3. Decreased strength M62.81 728.87                         OT Assessment/Plan       17 1221          OT Assessment    Assessment Comments Pt cont to improve with BUE strenght and endurance with 4# DB. Also improving FMC with typing drills and ataxia.  -SO        User Key  (r) = Recorded By, (t) = Taken By, (c) = Cosigned By    Initials Name Provider Type    SO Karen Gamble, OTR Occupational Therapist                OT Goals       17 1417 17 1200       OT Short Term Goals    STG Date to Achieve  17  -SO     STG 1  Pt. to be (I) with strengthening HEP.  -SO      STG 1 Progress  Ongoing  -SO     STG 2  Pt. and family to be independent with UE FMC HEP.  -SO     STG 2 Progress  Met  -SO     STG 3  Patient will reach to right and left sides in standing with moderate to maximum challenge without loss of balance.  -SO     STG 3 Progress  Ongoing  -SO     STG 4  Patient and family to be independent with gross motor coordination home exercise program.  -SO     STG 4 Progress  Met  -SO     STG 5  Patient will be instructed to RUE typing drills to work on fine motor coordination to type an e-mail using backspace key and mouse with RUE hand.  -SO     STG 5 Progress  Ongoing  -SO     Long Term Goals    LTG Date to Achieve  02/16/17  -SO     LTG 1  Pt to improve R  strength to 30# to increase independence.   -SO     LTG 1 Progress  Ongoing  -SO     LTG 2  Patient will improve dynamic standing balance to mod I with challenging task.  -SO     LTG 2 Progress  Ongoing  -SO     LTG 3  Patient will be able to complete laundry leval of assistance.  -SO     LTG 3 Progress  Ongoing  -SO     LTG 5  Patient will be at Mod I level with simple meal preparation.  -SO     LTG 5 Progress  Met  -SO     LTG 6  Patient to improve 9 hole peg test score to 25 seconds on affected side to demonstrate increased independence with fine motor tasks.  -SO     LTG 6 Progress  Ongoing  -SO     LTG 7  Pt. to improve Box and Blocks score to 62 blocks on affected side to demonstrate increased independence with daily tasks.  -SO     LTG 7 Progress  Ongoing  -SO     LTG 8  Pt to carry 30# object from various heights with no LOB to simulate work related task without AD.   -SO     LTG 8 Progress  Ongoing  -SO     Time Calculation    OT Goal Re-Cert Due Date 02/16/17  -SO 02/16/17  -SO       User Key  (r) = Recorded By, (t) = Taken By, (c) = Cosigned By    Initials Name Provider Type    SO Karen Gamble, OTR Occupational Therapist                Therapy Education       01/26/17 1222    Therapy Education     Given --   No new education.  -SO      01/24/17 1300    Therapy Education    Given Posture/body mechanics;Fall prevention and home safety;Mobility training  -EF    Program Progressed  -EF    How Provided Verbal;Demonstration  -EF    Provided to Patient  -EF    Level of Understanding Teach back education performed;Verbalized;Demonstrated  -EF      User Key  (r) = Recorded By, (t) = Taken By, (c) = Cosigned By    Initials Name Provider Type    EF Shanna Marshall, PT Physical Therapist    SO Karen Gamble OTR Occupational Therapist                    OT Exercises       01/26/17 1214          Subjective Comments    Subjective Comments Pt reports that she has a re-eval for swallow today. She also stated that she saw Dr Ramirez and that he ok'ed her to go back to work. He is requesting her to get a drivers evaluation at [facility].  -SO      Exercise 1    Exercise Name 1 Shoulder press, sh horiz add/abd, bicep fl  -SO      Weights/Plates 1 4  -SO      Sets 1 3  -SO      Reps 1 10  -SO      Exercise 2    Exercise Name 2 Typing drills standing at counter, working on increasing speed and accuracy  -SO      Time (Minutes) 2 10  -SO      Exercise 3    Exercise Name 3 RUE FMC balancing task with various sized balls to decrease RUE ataxia and improve accuracy. Pt with fair coordination with slower movements.  -SO        User Key  (r) = Recorded By, (t) = Taken By, (c) = Cosigned By    Initials Name Provider Type    SO RONEN Flores Occupational Therapist                    Time Calculation:   OT Start Time: 1016  OT Stop Time: 1058  OT Time Calculation (min): 42 min     Therapy Charges for Today     Code Description Service Date Service Provider Modifiers Qty    10729080509  OT NEUROMUSC RE EDUCATION EA 15 MIN 1/26/2017 RONEN Flores GO 2    [id number]  OT THER PROC EA 15 MIN 1/26/2017 RONEN Flores GO 1                    RONEN Flores  1/26/2017

## 2017-01-26 NOTE — PROGRESS NOTES
Outpatient Speech Language Pathology   Adult Swallow Initial Evaluation-VFSS  [facility]     Patient Name: Juli Luna  : 1995  MRN: [id number]  Today's Date: 2017         Visit Date: 2017     SPEECH-LANGUAGE PATHOLOGY EVALUTION - VFSS  Subjective: The patient was seen on this date for a VFSS(Videofluoroscopic Swallowing Study).  Patient was alert and cooperative.    Objective: Risks/benefits were reviewed with the patient and family, and consent was obtained. The study was completed with SLP present and Radiologist review. The patient was seen in lateral view(s). Textures given included thin liquid, nectar thick liquid, puree consistency, mechanical soft consistency and regular consistency.  Assessment:  Pt demonstrates a mild to moderate oropharyngeal dysphagia characterized by penetration of thin liquid, initially eliminated by chin tuck.  With fatigue thin liquid and mixed consistencies aspirated despite chin tuck. Trace to mild residue, reduced with multiple swallow.  Premature spillage to valleculae and pyriforms with mistiming of the swallow.  Eventual aspiration noted from pooled pyriform material.   SLP Findings: Patient presents with mild to moderate oropharyngeal dysphagia.   Comments: Excellent improvements noted since previous video study.  Recommendations: Diet Textures: nectar thick liquid, regular consistency food, avoid mixed consistencies. Medications should be taken whole with puree. May have water and Ice between meals after oral care, under staff or family supervision and with the recommended strategies for safe swallowing.  Recommended Strategies: Upright for PO, small bites and sips and double swallow with food and drink as needed. Oral care before breakfast, after all meals and PRN.  Other Recommended Evaluations: Repeat VFSS when deemed appropriate by treating SLP  Dysphagia therapy is recommended. Rationale: improve safety of swallow for po.        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   • H/O craniotomy   • Episode of syncope   • Iron deficiency anemia due to chronic blood loss        Past Medical History   Diagnosis Date   • AVM (arteriovenous malformation)    • History of pneumonia         Past Surgical History   Procedure Laterality Date   • Endoscopy w/ peg tube placement N/A 9/3/2016     Procedure: ESOPHAGOGASTRODUODENOSCOPY WITH PERCUTANEOUS ENDOSCOPIC GASTROSTOMY TUBE INSERTION;  Surgeon: René Ritter MD;  Location: [facility] ENDOSCOPY;  Service:    • Craniotomy for tumor Left 9/7/2016     Procedure: Posterior fossa suboccipital craniotomy and removal of brain stem arteriovenous malformation;  Surgeon: Josias Regalado MD;  Location: [facility] MAIN OR;  Service:    • Tracheostomy N/A 9/8/2016     Procedure: TRACHEOSTOMY;  Surgeon: Chad Negron MD;  Location: [facility] OR;  Service:          Visit Dx:     ICD-10-CM ICD-9-CM   1. Oropharyngeal dysphagia R13.12 787.22                   Adult Dysphagia - 01/26/17 1200     Adult Dysphagia Background    Current Diet (Solids) Puree With Some Soft Textures  -SA    Diet Level (Liquids) Honey Thick Liquid  -SA    Non-Oral Feeding --   PEG in place  -SA    SLP Communication to Radiology    Severity Level of Dysphagia mild dysphagia;moderate dysphagia;oral dysfunction;pharyngeal dysfunction  -SA    Consistencies Aspirated/Penetrated aspirated:;penetrated:;thin;mixed  -SA    Summary Statement Pt demonstrates a mild to moderate oropharyngeal dysphagia characterized by penetration of thin liquid, initially eliminated by chin tuck.  With fatigue thin liquid and mixed consistencies aspirated despite chin tuck. Trace to mild residue, reduced with multiple swallow.  Premature spillage to valleculae and pyriforms with mistiming of the swallow.  Eventual aspiration noted from  pooled pyriform material.    -SA      User Key  (r) = Recorded By, (t) = Taken By, (c) = Cosigned By    Initials Name Provider Type     Sveta Silas, MS CCC-SLP Speech and Language Pathologist                            OP SLP Education       01/26/17 1711          Education    Barriers to Learning No barriers identified  -      Education Provided Described results of evaluation;Patient expressed understanding of evaluation;Family/caregivers expressed understanding of evaluation  -SA      Assessed Learning needs;Learning motivation  -      Learning Motivation Strong  -      Learning Method Explanation  -      Teaching Response Verbalized understanding  -SA        User Key  (r) = Recorded By, (t) = Taken By, (c) = Cosigned By    Initials Name Effective Dates     Svetaparth TrejoonMS SOSA-SLP 04/13/15 -                     OP SLP Assessment/Plan - 01/26/17 1710     SLP Assessment    Functional Problems Swallowing  -SA    Impact on Function: Swallowing Risk of aspiration;Risk of pneumonia;Impact on social aspects of eating  -SA    Clinical Impression: Swallowing Mild-Moderate:;oropharyngeal phase dysphagia  -SA    SLP Diagnosis mild to mod oropharyngeal dysphagia  -SA    Prognosis Good (comment)  -SA    Patient/caregiver participated in establishment of treatment plan and goals Yes  -SA    Patient would benefit from skilled therapy intervention Yes  -SA    SLP Plan    Frequency TBD by treating SLP  -SA    Planned CPT's? SLP MBS: [id number]  -      User Key  (r) = Recorded By, (t) = Taken By, (c) = Cosigned By    Initials Name Provider Type     Sveta Glez MS CCC-SLP Speech and Language Pathologist              SLP Outcome Measures (last 72 hours)      SLP Outcome Measures       01/26/17 1200          SLP Outcome Measures    Outcome Measure Used? Adult NOMS  -SA      FCM Scores    Swallowing FCM Score 5  -SA        User Key  (r) = Recorded By, (t) = Taken By, (c) = Cosigned By    Initials Name Effective  Dates    SA Sveta Glez MS CCC-SLP 04/13/15 -                Time Calculation:   SLP Start Time: 1100  SLP Stop Time: 1215  SLP Time Calculation (min): 75 min    Therapy Charges for Today     Code Description Service Date Service Provider Modifiers Qty    39931779917 HC ST SWALLOWING CURRENT STATUS 1/26/2017 Sveta Glez MS CCC-SLP GN, CJ 1    58563129958 HC ST SWALLOWING PROJECTED 1/26/2017 Sveta Glez MS CCC-SLP GN, CJ 1    94148869865 HC ST SWALLOWING DISCHARGE 1/26/2017 Sveta Glez MS CCC-SLP GN, CJ 1    44927320544 HC ST EVAL ORAL PHARYNG SWALLOW 5 1/26/2017 Sveta Glez MS CCC-SLP GN 1          SLP G-Codes  SLP NOMS Used?: Yes  Functional Limitations: Swallowing  Swallow Current Status (): At least 20 percent but less than 40 percent impaired, limited or restricted  Swallow Goal Status (): At least 20 percent but less than 40 percent impaired, limited or restricted  Swallow Discharge Status (): At least 20 percent but less than 40 percent impaired, limited or restricted        Sveta Glez MS CCC-SLP  1/26/2017

## 2017-01-31 ENCOUNTER — HOSPITAL ENCOUNTER (OUTPATIENT)
Dept: SPEECH THERAPY | Facility: HOSPITAL | Age: 22
Setting detail: THERAPIES SERIES
Discharge: HOME | End: 2017-01-31

## 2017-01-31 ENCOUNTER — HOSPITAL ENCOUNTER (OUTPATIENT)
Dept: PHYSICAL THERAPY | Facility: HOSPITAL | Age: 22
Setting detail: THERAPIES SERIES
Discharge: HOME | End: 2017-01-31

## 2017-01-31 DIAGNOSIS — R13.12 OROPHARYNGEAL DYSPHAGIA: Primary | ICD-10-CM

## 2017-01-31 DIAGNOSIS — R26.9 ABNORMAL GAIT: Primary | ICD-10-CM

## 2017-01-31 PROCEDURE — 92526 ORAL FUNCTION THERAPY: CPT

## 2017-01-31 PROCEDURE — 97112 NEUROMUSCULAR REEDUCATION: CPT

## 2017-02-01 NOTE — PROGRESS NOTES
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 Oropharyngeal dysphagia R13.12 787.22           SLP OP Goals       17 1000       Subjective Comments Juli was late to arrive at today's therapy session. She is happy about her upgrade from her repeat video swallow study (17).   -HS       Able to rate subjective pain? yes  -HS    Pre-Treatment Pain Level 0  -HS    Post-Treatment Pain Level 0  -HS        Dysarthria LTG's     Patient will use verbal speech that is perceived as natural-sounding  by familiar/unfamiliar listeners     Status: Patient will use verbal speech that is perceived as natural-sounding  by familiar/unfamiliar listeners     Comments: Patient will use verbal speech that is perceived as natural-sounding  by familiar/unfamiliar listeners     Patient will apply compensatory strategies to improve intelligibility of speech during in spontaneous speech 90%:;without cues  -HS    Status: Patient will apply compensatory strategies to improve intelligibility of speech during in spontaneous speech --   Did not target this session.  -HS    Comments: Patient will apply compensatory strategies to improve intelligibility of speech during in spontaneous speech        Status: Patient will maintain nutrition/hydration via alternative means Achieved  -HS    Comments: Patient will maintain nutrition/hydration via alternative means Achieved 16. Patient will have PEG removed per MD recommendations.   -HS    Pt will tolerate some PO  diet w/o aspiration to adequately meet nutrition/hydration needs Regular diet (no mixed consistencies) with Nectar Thick Liquids (with chin tuck).  -HS    Status: Pt will tolerate some PO diet w/o aspiration to adequately meet nutrition/hydration needs Revised  -HS    Comments: Pt will tolerate some PO diet w/o aspiration to adequately meet nutrition/hydration needs Updated per VFSS recommendations on 01/23/17.   -HS    Status: Patient will increase hydration by tolerating water between meals after oral care Progressing as expected  -HS    Comments: Patient will increase hydration by tolerating water between meals after oral care Patient cleared to have water with chin tuck to increase hydration. Reviewed water protocol/precautions with patient. Handout provided.   -HS    Patient will improve oral skills to enhance safety and increase eating efficiency by increasing accuracy of A-P tongue movements     Status: Patient will improve oral skills to enhance safety and increase eating efficiency by increasing accuracy of A-P tongue movements Achieved  -HS    Comments: Patient will improve oral skills to enhance safety and increase eating efficiency by increasing accuracy of A-P tongue movements Achieved 1/19/17.   -HS    Patient will increase laryngeal elevation to reduce residue that might fall into airway by completing Mendelsohn maneuver;without cues   Increased repetitions to 30 (previously 10)  -HS    Status: Patient will increase laryngeal elevation to reduce residue that might fall into airway by completing Revised   To improve endurance/reduce risk of fatigue  -HS    Comments: Patient will increase laryngeal elevation to reduce residue that might fall into airway by completing     Patient will increase strength of tongue base and posterior pharyngeal walls to reduce residue that might fall into airway by completing Liza (tongue hold);without cues   100%  -HS    Status: Patient will increase strength of tongue  base and posterior pharyngeal walls to reduce residue that might fall into airway by completing Achieved  -HS    Comments: Patient will increase strength of tongue base and posterior pharyngeal walls to reduce residue that might fall into airway by completing 100% without cues. Goal criteria met x3 sessions. Patient to continue to perform independently via HEP.   -HS    Status: Patient will increase superior/anterior movement of hyolaryngeal complex to reduce residue which may fall into airway by using the Expiratory Muscle Strength Trainer Achieved   Completes via HEP.  -HS    Comments: Patient will increase superior/anterior movement of hyolaryngeal complex to reduce residue which may fall into airway by using the Expiratory Muscle Strength Trainer     Patient will increase tipping of arytenoids and closure at entrance to the airway to reduce penetration into the vestibule by completing super-supraglottic swallow;with intermittent cues   Increase repetitions to 30 (previously 10)  -HS    Status: Patient will increase tipping of arytenoids and closure at entrance to the airway to reduce penetration into the vestibule by completing Revised   To improve endurance/reduce risk of fatigue  -HS    Comments: Patient will increase tipping of arytenoids and closure at entrance to the airway to reduce penetration into the vestibule by completing     Patient will compensate for oral/pharyngeal deficits and reduce risks while eating by utilizing  compensatory strategies chin down;controlled bolus size;without cues   Nectar thick liquids  -HS    Status: Patient will compensate for oral/pharyngeal deficits and reduce risks while eating by utilizing  compensatory strategies Revised   To reflect recommendations from VFSS 1/23/17.  -HS    Comments: Patient will compensate for oral/pharyngeal deficits and reduce risks while eating by utilizing  compensatory strategies        Other Adult Goal- 1 Patient will increase  superior/anterior movement of the hyolaryngeal complex to improve swallow safety by triggering swallow at peak of amplitude on 100% of opportunities during neuromuscular electrical stimulation (NMES).  -HS    Status: Other Adult Goal- 1 --   Did not target - no electrode. Electrodes have been ordered.  -HS    Comments: Other Adult Goal- 1        SLP Goal Re-Cert Due Date       User Key  (r) = Recorded By, (t) = Taken By, (c) = Cosigned By    Initials Name Provider Type     Batool Kendallgilbertcassi MS CCC-SLP Speech and Language Pathologist              OP SLP Education       01/31/17 1000          Education    Barriers to Learning No barriers identified  -HS      Education Provided Described results of evaluation   Reviewed VFSS results and recommendations.  -HS      Assessed Learning needs;Learning motivation;Learning preferences;Learning readiness  -HS      Learning Motivation Strong  -HS      Learning Method Explanation;Written materials  -HS      Teaching Response Verbalized understanding;Demonstrated understanding  -HS        User Key  (r) = Recorded By, (t) = Taken By, (c) = Cosigned By    Initials Name Effective Dates     Batool Bright MS CCC-SLP 04/13/15 -               OP SLP Assessment/Plan - 01/31/17 1000     SLP Assessment    Clinical Impression: Swallowing Mild-Moderate:;oropharyngeal phase dysphagia  -HS    Clinical Impression Comments Goals revised based on results of repeat VFSS on 01/23/17.   -HS    SLP Plan    Frequency 2x/week  -HS    Duration 6 weeks  -HS      User Key  (r) = Recorded By, (t) = Taken By, (c) = Cosigned By    Initials Name Provider Type    TRAY Bright MS CCC-SLP Speech and Language Pathologist           SLP Outcome Measures (last 72 hours)      SLP Outcome Measures       01/31/17 1000          OTHER    FCM Chosen Swallowing  -HS      Swallowing FCM Score 5  -HS        User Key  (r) = Recorded By, (t) = Taken By, (c) = Cosigned By    Initials Name Effective Dates    HS  Batool Bright MS CCC-SLP 04/13/15 -         Time Calculation:   SLP Start Time: 0930 (Late arrival)  SLP Stop Time: 1000  SLP Time Calculation (min): 30 min    Therapy Charges for Today     Code Description Service Date Service Provider Modifiers Qty    96952592584 HC ST TREATMENT SWALLOW 2 1/31/2017 Batool Bright MS CCC-SLP 59, GN 1              Batool Bright MS CCC-SLP  1/31/2017

## 2017-02-02 ENCOUNTER — HOSPITAL ENCOUNTER (OUTPATIENT)
Dept: SPEECH THERAPY | Facility: HOSPITAL | Age: 22
Setting detail: THERAPIES SERIES
Discharge: HOME | End: 2017-02-02

## 2017-02-02 ENCOUNTER — HOSPITAL ENCOUNTER (OUTPATIENT)
Dept: OCCUPATIONAL THERAPY | Facility: HOSPITAL | Age: 22
Setting detail: THERAPIES SERIES
Discharge: HOME | End: 2017-02-02

## 2017-02-02 DIAGNOSIS — R13.12 OROPHARYNGEAL DYSPHAGIA: Primary | ICD-10-CM

## 2017-02-02 PROCEDURE — 97110 THERAPEUTIC EXERCISES: CPT | Performed by: OCCUPATIONAL THERAPIST

## 2017-02-02 PROCEDURE — 92526 ORAL FUNCTION THERAPY: CPT

## 2017-02-02 PROCEDURE — 92507 TX SP LANG VOICE COMM INDIV: CPT

## 2017-02-02 PROCEDURE — 97112 NEUROMUSCULAR REEDUCATION: CPT | Performed by: OCCUPATIONAL THERAPIST

## 2017-02-02 NOTE — PROGRESS NOTES
Inpatient Rehabilitation - Occupational Therapy Treatment Note  [facility]     Patient Name: Juli Luna  : 1995  MRN: [id number]  Today's Date: 2017               Admit Date: 2017    Visit Dx:   No diagnosis found.  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   • H/O craniotomy   • Episode of syncope   • Iron deficiency anemia due to chronic blood loss                     OT Recommendation and Plan             Time Calculation:         Time Calculation- OT       17 1636          Time Calculation- OT    OT Start Time 1015  -SO      OT Stop Time 1100  -SO      OT Time Calculation (min) 45 min  -SO      OT Received On 17  -SO        User Key  (r) = Recorded By, (t) = Taken By, (c) = Cosigned By    Initials Name Provider Type    SO RONEN Flores Occupational Therapist           Therapy Charges for Today     Code Description Service Date Service Provider Modifiers Qty    56095673493 HC OT NEUROMUSC RE EDUCATION EA 15 MIN 2017 RONEN Flores GO 1    83534110931 HC OT THER PROC EA 15 MIN 2017 RONEN Flores GO 2               RONEN Flores  2017

## 2017-02-03 NOTE — PROGRESS NOTES
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 Oropharyngeal dysphagia R13.12 787.22           SLP OP Goals       17 1015       Subjective Comments Juli is highly motivated to participate in therapy. She states that her 's evaluation has been scheduled for later this month. She also reports a consult for PEG removal mid-February.   -HS       Able to rate subjective pain? yes  -HS    Pre-Treatment Pain Level 0  -HS    Post-Treatment Pain Level 0  -HS       Patient will apply compensatory strategies to improve intelligibility of speech during in spontaneous speech 90%:;without cues  -HS    Status: Patient will apply compensatory strategies to improve intelligibility of speech during in spontaneous speech --   Did not target this session.   -HS       Status: Patient will maintain nutrition/hydration via alternative means     Comments: Patient will maintain nutrition/hydration via alternative means     Pt will tolerate some PO diet w/o aspiration to adequately meet nutrition/hydration needs     Status: Pt will tolerate some PO diet w/o aspiration to adequately meet nutrition/hydration needs     Comments: Pt will tolerate some PO diet w/o aspiration to adequately meet nutrition/hydration needs     Status: Patient will increase hydration by tolerating water between meals after oral care     Comments: Patient will increase hydration by tolerating water between meals after oral care     Patient will improve oral skills to enhance safety  and increase eating efficiency by increasing accuracy of A-P tongue movements without cues  -HS    Status: Patient will improve oral skills to enhance safety and increase eating efficiency by increasing accuracy of A-P tongue movements Achieved  -HS    Comments: Patient will improve oral skills to enhance safety and increase eating efficiency by increasing accuracy of A-P tongue movements Achieved 1/19/17.   -HS    Patient will increase laryngeal elevation to reduce residue that might fall into airway by completing Mendelsohn maneuver;without cues;falsetto/pitch glide   30 repetitions   -HS    Status: Patient will increase laryngeal elevation to reduce residue that might fall into airway by completing Revised;Progressing as expected   Added falsetto x30 reps  -HS    Comments: Patient will increase laryngeal elevation to reduce residue that might fall into airway by completing 20 repetitions.   -HS    Patient will increase strength of tongue base and posterior pharyngeal walls to reduce residue that might fall into airway by completing Liza (tongue hold);without cues  -HS    Status: Patient will increase strength of tongue base and posterior pharyngeal walls to reduce residue that might fall into airway by completing Achieved  -HS    Comments: Patient will increase strength of tongue base and posterior pharyngeal walls to reduce residue that might fall into airway by completing Achieved 1/31/17.   -HS    Status: Patient will increase superior/anterior movement of hyolaryngeal complex to reduce residue which may fall into airway by using the Expiratory Muscle Strength Trainer Achieved  -HS    Comments: Patient will increase superior/anterior movement of hyolaryngeal complex to reduce residue which may fall into airway by using the Expiratory Muscle Strength Trainer Patient completes via HEP.   -HS    Patient will increase tipping of arytenoids and closure at entrance to the airway to reduce penetration into the  vestibule by completing super-supraglottic swallow;with intermittent cues   30 repetitions  -HS    Status: Patient will increase tipping of arytenoids and closure at entrance to the airway to reduce penetration into the vestibule by completing Progressing as expected  -HS    Comments: Patient will increase tipping of arytenoids and closure at entrance to the airway to reduce penetration into the vestibule by completing 20 repetitions  -HS    Patient will compensate for oral/pharyngeal deficits and reduce risks while eating by utilizing  compensatory strategies chin down;controlled bolus size;without cues   Nectar thick liquids  -HS    Status: Patient will compensate for oral/pharyngeal deficits and reduce risks while eating by utilizing  compensatory strategies --   Did not target this session.   -HS    Comments: Patient will compensate for oral/pharyngeal deficits and reduce risks while eating by utilizing  compensatory strategies        Other Adult Goal- 1 Patient will increase superior/anterior movement of the hyolaryngeal complex to improve swallow safety by triggering swallow at peak of amplitude on 100% of opportunities during neuromuscular electrical stimulation (NMES).  -HS    Status: Other Adult Goal- 1 Progressing as expected  -HS    Comments: Other Adult Goal- 1 Parameters: 50 Hz, 175 phase duration, 9 amps. Had to lower the amplitude due to discomfort (stinging sensation). Note: new electrodes used this session. Patient tolerated well for 30 minutes. Triggered swallow at peak of amplitude with 95% accuracy without cues. Goal criteria met x2 sessions. Will continue goal for carryover/generalization.      -HS      User Key  (r) = Recorded By, (t) = Taken By, (c) = Cosigned By    Initials Name Provider Type    HS Batool Bright MS CCC-SLP Speech and Language Pathologist              OP SLP Education       02/02/17 1015       Barriers to Learning No barriers identified  -HS    Education Provided --   NMES    -HS    Assessed     Learning Motivation Strong  -HS    Learning Method Explanation  -HS    Teaching Response Verbalized understanding  -HS      User Key  (r) = Recorded By, (t) = Taken By, (c) = Cosigned By    Initials Name Effective Dates    TRAY Bright MS CCC-SLP 04/13/15 -               OP SLP Assessment/Plan - 02/02/17 1015     SLP Plan    Plan Comments Continue therapy.   -HS      User Key  (r) = Recorded By, (t) = Taken By, (c) = Cosigned By    Initials Name Provider Type    TRAY Bright MS CCC-SLP Speech and Language Pathologist           SLP Outcome Measures (last 72 hours)      SLP Outcome Measures       01/31/17 1000          OTHER    FCM Chosen Swallowing  -HS      Swallowing FCM Score 5  -HS        User Key  (r) = Recorded By, (t) = Taken By, (c) = Cosigned By    Initials Name Effective Dates    TRAY Bright MS CCC-SLP 04/13/15 -         Time Calculation:   SLP Start Time: 0915  SLP Stop Time: 1000  SLP Time Calculation (min): 45 min    Therapy Charges for Today     Code Description Service Date Service Provider Modifiers Qty    71280812114 HC ST TREATMENT SWALLOW 3 2/2/2017 Batool Bright MS CCC-SLP 59, GN 1              Batool Bright MS CCC-SLP  2/2/2017

## 2017-02-07 ENCOUNTER — HOSPITAL ENCOUNTER (OUTPATIENT)
Dept: PHYSICAL THERAPY | Facility: HOSPITAL | Age: 22
Setting detail: THERAPIES SERIES
Discharge: HOME | End: 2017-02-07

## 2017-02-07 ENCOUNTER — HOSPITAL ENCOUNTER (OUTPATIENT)
Dept: SPEECH THERAPY | Facility: HOSPITAL | Age: 22
Setting detail: THERAPIES SERIES
Discharge: HOME | End: 2017-02-07

## 2017-02-07 DIAGNOSIS — R47.1 DYSARTHRIA: ICD-10-CM

## 2017-02-07 DIAGNOSIS — R26.9 ABNORMAL GAIT: Primary | ICD-10-CM

## 2017-02-07 DIAGNOSIS — R13.12 OROPHARYNGEAL DYSPHAGIA: Primary | ICD-10-CM

## 2017-02-07 PROCEDURE — 92507 TX SP LANG VOICE COMM INDIV: CPT

## 2017-02-07 PROCEDURE — 97112 NEUROMUSCULAR REEDUCATION: CPT

## 2017-02-07 PROCEDURE — 92526 ORAL FUNCTION THERAPY: CPT

## 2017-02-07 NOTE — PROGRESS NOTES
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 Oropharyngeal dysphagia R13.12 787.22   2. Dysarthria R47.1 784.51           SLP OP Goals       17 1400       Subjective Comments Juli feels that her speech is continuing to improve.   -HS       Able to rate subjective pain? yes  -HS    Pre-Treatment Pain Level 0  -HS    Post-Treatment Pain Level 0  -HS       Patient will apply compensatory strategies to improve intelligibility of speech during in spontaneous speech 90%:;without cues  -HS    Status: Patient will apply compensatory strategies to improve intelligibility of speech during in spontaneous speech Progressing as expected   Partially Met  -HS    Comments: Patient will apply compensatory strategies to improve intelligibility of speech during in spontaneous speech 90% without cues during simple conversational speech (SLP as listener).   -HS       Status: Patient will maintain nutrition/hydration via alternative means     Comments: Patient will maintain nutrition/hydration via alternative means     Pt will tolerate some PO diet w/o aspiration to adequately meet nutrition/hydration needs     Status: Pt will tolerate some PO diet w/o aspiration to adequately meet nutrition/hydration needs     Comments: Pt will tolerate some PO diet w/o aspiration to adequately meet nutrition/hydration needs     Status: Patient will increase hydration by tolerating water between meals after oral care     Comments: Patient will increase  hydration by tolerating water between meals after oral care     Patient will improve oral skills to enhance safety and increase eating efficiency by increasing accuracy of A-P tongue movements without cues  -HS    Status: Patient will improve oral skills to enhance safety and increase eating efficiency by increasing accuracy of A-P tongue movements Achieved  -HS    Comments: Patient will improve oral skills to enhance safety and increase eating efficiency by increasing accuracy of A-P tongue movements Achieved 1/19/17.   -HS    Patient will increase laryngeal elevation to reduce residue that might fall into airway by completing Mendelsohn maneuver;without cues;falsetto/pitch glide   30 repetitions   -HS    Status: Patient will increase laryngeal elevation to reduce residue that might fall into airway by completing Progressing as expected  -HS    Comments: Patient will increase laryngeal elevation to reduce residue that might fall into airway by completing 25 repetitions without cues.   -HS    Patient will increase strength of tongue base and posterior pharyngeal walls to reduce residue that might fall into airway by completing Liza (tongue hold);without cues  -HS    Status: Patient will increase strength of tongue base and posterior pharyngeal walls to reduce residue that might fall into airway by completing Achieved  -HS    Comments: Patient will increase strength of tongue base and posterior pharyngeal walls to reduce residue that might fall into airway by completing Achieved 1/31/17.   -HS    Status: Patient will increase superior/anterior movement of hyolaryngeal complex to reduce residue which may fall into airway by using the Expiratory Muscle Strength Trainer Achieved  -HS    Comments: Patient will increase superior/anterior movement of hyolaryngeal complex to reduce residue which may fall into airway by using the Expiratory Muscle Strength Trainer Patient completes via HEP.   -HS    Patient will increase  tipping of arytenoids and closure at entrance to the airway to reduce penetration into the vestibule by completing --   30 repetitions  -HS    Status: Patient will increase tipping of arytenoids and closure at entrance to the airway to reduce penetration into the vestibule by completing Progressing as expected  -HS    Comments: Patient will increase tipping of arytenoids and closure at entrance to the airway to reduce penetration into the vestibule by completing 25 repetitions without cues  -HS    Patient will compensate for oral/pharyngeal deficits and reduce risks while eating by utilizing  compensatory strategies chin down;controlled bolus size;without cues   Nectar thick liquids  -HS    Status: Patient will compensate for oral/pharyngeal deficits and reduce risks while eating by utilizing  compensatory strategies Progressing as expected   Partially Met  -HS    Comments: Patient will compensate for oral/pharyngeal deficits and reduce risks while eating by utilizing  compensatory strategies Independent x1 session. Will continue goal for carryover/generalization.  -HS       Other Adult Goal- 1 Patient will increase superior/anterior movement of the hyolaryngeal complex to improve swallow safety by triggering swallow at peak of amplitude on 100% of opportunities during neuromuscular electrical stimulation (NMES).  -HS    Status: Other Adult Goal- 1 Progressing as expected  -HS    Comments: Other Adult Goal- 1 Parameters: 50 Hz, 150 phase duration, 9 amps. Had to lower the phase duration due to discomfort (stinging sensation). Note:Patient did state that she changed her facewash and question whether that impacts. Patient tolerated well for 30 minutes. Triggered swallow at peak of amplitude with 95% accuracy without cues. Goal criteria met x2 sessions. Will continue goal for carryover/generalization.      -HS      User Key  (r) = Recorded By, (t) = Taken By, (c) = Cosigned By    Initials Name Provider Type    HS  Batool Bright MS CCC-SLP Speech and Language Pathologist              OP SLP Education       02/07/17 1000          Education    Barriers to Learning No barriers identified  -HS      Education Provided --   Progress towards goals.   -HS      Learning Motivation Strong  -HS      Learning Method Explanation  -HS      Teaching Response Verbalized understanding  -HS        User Key  (r) = Recorded By, (t) = Taken By, (c) = Cosigned By    Initials Name Effective Dates    HS Batool AidacassiMS SOSA-SLP 04/13/15 -               OP SLP Assessment/Plan - 02/07/17 1000     SLP Plan    Plan Comments Continue therapy.   -HS      User Key  (r) = Recorded By, (t) = Taken By, (c) = Cosigned By    Initials Name Provider Type    HS Batool MS Kaila CCC-SLP Speech and Language Pathologist        Time Calculation:   SLP Start Time: 0925  SLP Stop Time: 1000  SLP Time Calculation (min): 35 min    Therapy Charges for Today     Code Description Service Date Service Provider Modifiers Qty    97013475454 HC ST TREATMENT SWALLOW 1 2/7/2017 Batool Bright MS CCC-SLP 59, GN 1    [id number] HC ST TREATMENT SPEECH 1 2/7/2017 Batool Bright MS CCC-SLP 59, GN 1              Batool Bright MS CCC-SLP  2/7/2017

## 2017-02-07 NOTE — PROGRESS NOTES
Outpatient Physical Therapy Neuro Treatment Note  [facility]     Patient Name: Juli Luna  : 1995  MRN: [id number]  Today's Date: 2017      Visit Date: 2017    Visit Dx:    ICD-10-CM ICD-9-CM   1. 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  -EF      Precautions and Contraindications    Precautions/Limitations fall precautions;swallowing precautions   thickened liquids  -EF      Precautions falls, PEG  -EF      Subjective Pain    Able to rate subjective pain? yes  -EF      Pre-Treatment Pain Level 0  -EF      Post-Treatment Pain Level 0  -EF      Pain Assessment    Pain Assessment No/denies pain  -EF      Transfers    Transfers, Sit-Stand [place] conditional independence;independent  -EF      Transfers, Stand-Sit [place] conditional independence;independent  -EF      Gait Assessment/Treatment    Gait, [place] Level conditional independence;independent  -EF      Gait, Distance (Feet) 200   x 2  -EF      Gait, Gait Deviations ataxia   slight ataxia, decreased arm swing, decreased trunk rotation  -EF      Gait, Safety Issues weight-shifting ability decreased  -EF      Gait, Impairments strength decreased;impaired balance;coordination impaired;motor control impaired  -EF      Balance Skills Training    Gait Balance Activities braiding / front;side-stepping   front & back crossovers w/ UE support & cga  -EF        User Key  (r) = Recorded By, (t) = Taken By, (c) = Cosigned By    Initials Name Provider Type    EF Shanna Marshall, PT Physical Therapist                         PT Assessment/Plan       02/07/17 1206          PT Assessment    Assessment Comments Pt demonstrating decreasing ataxia with ambulation.  Is still challenged by crossovers and braiding.  -EF      PT Plan    PT Frequency 1x/week  -EF      PT Plan Comments Continue with Plan of Care.  -EF        User Key  (r) = Recorded By, (t) = Taken By, (c) = Cosigned By    Initials Name Provider Type    EF Shanna Marshall, PT Physical Therapist                     Exercises       02/07/17 1152          Subjective Comments    Subjective Comments i'm doing ok. 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  -EF      Subjective Pain    Able to rate subjective pain? yes  -EF      Pre-Treatment Pain Level 0  -EF      Post-Treatment Pain Level 0  -EF      Exercise 2    Exercise Name 2 step ups on green step w/ 1 pink section at hemibars fwd & sideways (sideways up & over)  -EF      Reps 2 20  -EF      Exercise 3    Exercise Name 3 on all 4's, LE lifts  -EF      Reps 3 5  -EF      Exercise 4    Exercise Name 4 Resisted walking with pt pushing roller table & therapist resisitng (120' x 2).  -EF      Exercise 5    Exercise Name 5 Standing trunk rotation holding 2# bar  -EF      Reps 5 10  -EF      Exercise 7    Exercise Name 7 alt touches on step with sba without UE support  -EF      Reps 7 20  -EF      Exercise 10    Exercise Name 10 Jumps in squat position with sba at hemibars with UE support  -EF      Reps 10 10  -EF      Exercise 11    Exercise Name 11 dribbling basketball while walking & switching hands, changing directions  -EF      Reps 11 --   6 laps around gym  -EF      Exercise 12    Exercise Name 12 Walking on heels & toes at hemibars with sba  -EF      Reps 12 4   laps each  -EF      Exercise 13    Exercise Name 13 jumping jacks with LEs only with sba at hemibars  -EF      Reps 13 10  -EF      Exercise 20    Exercise Name 20 alt hip flexion with back against wall with right side to counter to use for support  as needed  -EF      Reps 20 10  -EF        User Key  (r) = Recorded By, (t) = Taken By, (c) = Cosigned By    Initials Name Provider Type    REZA Marshall PT Physical Therapist                                  Therapy Education       02/07/17 1205    Therapy Education    Given Posture/body mechanics;Fall prevention and home safety;Mobility training  -EF    Program Progressed  -EF    How Provided Verbal;Demonstration  -EF    Provided to Patient  -EF    Level of Understanding Teach back education performed;Verbalized;Demonstrated  -EF      02/02/17 1636    Therapy Education    Given HEP   5# DB  -SO    Program Reinforced  -SO    How Provided Verbal  -SO    Provided to Patient  -SO    Level of Understanding Verbalized  -SO      User Key  (r) = Recorded By, (t) = Taken By, (c) = Cosigned By    Initials Name Provider Type    REZA Marshall, SOFY Physical Therapist    SO Karen Gamble, OTR Occupational Therapist                Time Calculation:   Start Time: 1020  Stop Time: 1105  Time Calculation (min): 45 min     Therapy Charges for Today     Code Description Service Date Service Provider Modifiers Qty    60809649888 HC PT NEUROMUSC RE EDUCATION EA 15 MIN 2/7/2017 Shanna Marshall, PT GP 3                    Shanna Marshall PT  2/7/2017

## 2017-02-09 ENCOUNTER — HOSPITAL ENCOUNTER (OUTPATIENT)
Dept: OCCUPATIONAL THERAPY | Facility: HOSPITAL | Age: 22
Setting detail: THERAPIES SERIES
Discharge: HOME | End: 2017-02-09

## 2017-02-09 ENCOUNTER — HOSPITAL ENCOUNTER (OUTPATIENT)
Dept: SPEECH THERAPY | Facility: HOSPITAL | Age: 22
Setting detail: THERAPIES SERIES
Discharge: HOME | End: 2017-02-09

## 2017-02-09 DIAGNOSIS — Z86.79 HISTORY OF INTRACRANIAL HEMORRHAGE: ICD-10-CM

## 2017-02-09 DIAGNOSIS — R13.12 OROPHARYNGEAL DYSPHAGIA: Primary | ICD-10-CM

## 2017-02-09 DIAGNOSIS — R53.1 DECREASED STRENGTH: ICD-10-CM

## 2017-02-09 DIAGNOSIS — R27.8 COORDINATION IMPAIRMENTS: Primary | ICD-10-CM

## 2017-02-09 PROCEDURE — 97112 NEUROMUSCULAR REEDUCATION: CPT | Performed by: OCCUPATIONAL THERAPIST

## 2017-02-09 PROCEDURE — 97110 THERAPEUTIC EXERCISES: CPT | Performed by: OCCUPATIONAL THERAPIST

## 2017-02-09 PROCEDURE — 92526 ORAL FUNCTION THERAPY: CPT

## 2017-02-09 NOTE — PROGRESS NOTES
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   • H/O craniotomy   • Episode of syncope   • Iron deficiency anemia due to chronic blood loss        Past Medical History   Diagnosis Date   • AVM (arteriovenous malformation)    • History of pneumonia         Past Surgical History   Procedure Laterality Date   • Endoscopy w/ peg tube placement N/A 9/3/2016     Procedure: ESOPHAGOGASTRODUODENOSCOPY WITH PERCUTANEOUS ENDOSCOPIC GASTROSTOMY TUBE INSERTION;  Surgeon: René Ritter MD;  Location: [facility] ENDOSCOPY;  Service:    • Craniotomy for tumor Left 2016     Procedure: Posterior fossa suboccipital craniotomy and removal of brain stem arteriovenous malformation;  Surgeon: Josias Regalado MD;  Location: [facility] OR;  Service:    • Tracheostomy N/A 2016     Procedure: TRACHEOSTOMY;  Surgeon: Chad Negron MD;  Location: [facility] OR;  Service:          Visit Dx:    ICD-10-CM ICD-9-CM   1. Coordination impairments R27.8 781.3   2. History of intracranial hemorrhage Z86.79 V12.59   3. Decreased strength M62.81 728.87                         OT Assessment/Plan       17 1610 17 1635       OT Assessment    Assessment Comments Pt cont to show increase in UE strength and endurance as well as FMC.  -SO Pt strenght and endurance improving, able to carry simulated 30# for job task 5 trips. Also able to use 5# DB for UE strengtheing.  -SO       User Key  (r) = Recorded By, (t) = Taken By, (c) = Cosigned By    Initials Name Provider Type    EKATERINA Gamble OTR Occupational Therapist                    Therapy Education       17 1205    Therapy Education     Given Posture/body mechanics;Fall prevention and home safety;Mobility training  -EF    Program Progressed  -EF    How Provided Verbal;Demonstration  -EF    Provided to Patient  -EF    Level of Understanding Teach back education performed;Verbalized;Demonstrated  -EF      02/02/17 1636    Therapy Education    Given HEP   5# DB  -SO    Program Reinforced  -SO    How Provided Verbal  -SO    Provided to Patient  -SO    Level of Understanding Verbalized  -SO      User Key  (r) = Recorded By, (t) = Taken By, (c) = Cosigned By    Initials Name Provider Type    EF Shanna Marshall, PT Physical Therapist    SO Karen Gamble OTR Occupational Therapist                    OT Exercises       02/09/17 1600          Subjective Comments    Subjective Comments Pt reports that she feels that her balance and UE strenght has improved as she is now able to carry her dog outside.  -SO      Exercise 1    Exercise Name 1 UE stregthening: lat pull down close  and wide , bicep curl, tim cabrales  -SO      Cueing 1 Verbal  -SO      Equipment 1 Madalyn   Rickshaw  -SO      Weights/Plates 1 15  -SO      Sets 1 3  -SO      Reps 1 10  -SO      Exercise 2    Exercise Name 2 RUE FMC, pt showed some very slight ataxia but increased accuracy with picking up small sticks with 2 pt pinch  -SO        User Key  (r) = Recorded By, (t) = Taken By, (c) = Cosigned By    Initials Name Provider Type    SO RONEN Flores Occupational Therapist                    Time Calculation:   OT Start Time: 1015  OT Stop Time: 1100  OT Time Calculation (min): 45 min     Therapy Charges for Today     Code Description Service Date Service Provider Modifiers Qty    22415890071  OT THER PROC EA 15 MIN 2/9/2017 RONEN Flores GO 2    [id number]  OT NEUROMUSC RE EDUCATION EA 15 MIN 2/9/2017 RONEN Flores GO 1                    RONEN Flores  2/9/2017

## 2017-02-10 NOTE — PROGRESS NOTES
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 Oropharyngeal dysphagia R13.12 787.22           SLP OP Goals       17 1000       Subjective Comments Juli is highly motivated to participate in therapy.   -HS       Able to rate subjective pain? yes  -HS    Pre-Treatment Pain Level 0  -HS    Post-Treatment Pain Level 0  -HS       Patient will apply compensatory strategies to improve intelligibility of speech during in spontaneous speech 90%:;without cues  -HS    Status: Patient will apply compensatory strategies to improve intelligibility of speech during in spontaneous speech --   Did not target this session  -HS    Comments: Patient will apply compensatory strategies to improve intelligibility of speech during in spontaneous speech        Patient will improve oral skills to enhance safety and increase eating efficiency by increasing accuracy of A-P tongue movements without cues  -HS    Status: Patient will improve oral skills to enhance safety and increase eating efficiency by increasing accuracy of A-P tongue movements Achieved  -HS    Comments: Patient will improve oral skills to enhance safety and increase eating efficiency by increasing accuracy of A-P tongue movements Achieved 17.   -HS    Patient will increase laryngeal elevation to reduce residue that might fall into airway by completing Mendelsohn maneuver;without cues;falsetto/pitch glide   30 repetitions   -HS    Status: Patient will increase laryngeal elevation to  reduce residue that might fall into airway by completing Progressing as expected   Partially Met  -HS    Comments: Patient will increase laryngeal elevation to reduce residue that might fall into airway by completing 30 repetitions without cues. Goal criteria met x1 session. Will continue goal for carryover/generalization.  -HS    Patient will increase strength of tongue base and posterior pharyngeal walls to reduce residue that might fall into airway by completing Liza (tongue hold);without cues  -HS    Status: Patient will increase strength of tongue base and posterior pharyngeal walls to reduce residue that might fall into airway by completing Achieved  -HS    Comments: Patient will increase strength of tongue base and posterior pharyngeal walls to reduce residue that might fall into airway by completing Achieved 1/31/17.   -HS    Status: Patient will increase superior/anterior movement of hyolaryngeal complex to reduce residue which may fall into airway by using the Expiratory Muscle Strength Trainer Achieved  -HS    Comments: Patient will increase superior/anterior movement of hyolaryngeal complex to reduce residue which may fall into airway by using the Expiratory Muscle Strength Trainer Patient completes via HEP.   -HS    Patient will increase tipping of arytenoids and closure at entrance to the airway to reduce penetration into the vestibule by completing super-supraglottic swallow;without cues   30 repetitions  -HS    Status: Patient will increase tipping of arytenoids and closure at entrance to the airway to reduce penetration into the vestibule by completing Progressing as expected   Partially Met  -HS    Comments: Patient will increase tipping of arytenoids and closure at entrance to the airway to reduce penetration into the vestibule by completing 30 repetitions without cues. Goal criteria met x1 session. Will continue goal for carryover/generalization.  -HS    Patient will compensate for  oral/pharyngeal deficits and reduce risks while eating by utilizing  compensatory strategies chin down;controlled bolus size;without cues   Nectar thick liquids  -HS    Status: Patient will compensate for oral/pharyngeal deficits and reduce risks while eating by utilizing  compensatory strategies --   Partially Met  -HS    Comments: Patient will compensate for oral/pharyngeal deficits and reduce risks while eating by utilizing  compensatory strategies Independent x2 sessions. Goal criteria met x2 sessions. Will continue goal x1 more session to establish carryover.  -HS       Other Adult Goal- 1 Patient will increase superior/anterior movement of the hyolaryngeal complex to improve swallow safety by triggering swallow at peak of amplitude on 100% of opportunities during neuromuscular electrical stimulation (NMES).  -HS    Status: Other Adult Goal- 1 Progressing as expected   Partially Met  -HS    Comments: Other Adult Goal- 1 Parameters: 50 Hz, 175 phase duration, 12 amps. Patient tolerated well for 15 mintues and then experienced a stinging sensation on the Left electrode. Reduced phase duration to 150 and amplitude to 9 for patient comfort for the last 15 minutes. Triggered swallow at peak of amplitude with 100% accuracy without cues. Goal criteria met x1 session. Will continue goal for carryover/generalization.      -HS      User Key  (r) = Recorded By, (t) = Taken By, (c) = Cosigned By    Initials Name Provider Type    HS Batool Bright MS CCC-SLP Speech and Language Pathologist              OP SLP Education       02/09/17 1000       Barriers to Learning No barriers identified  -HS    Education Provided --   NMES   -HS    Learning Motivation Strong  -HS    Learning Method Explanation  -HS    Teaching Response Verbalized understanding  -HS      User Key  (r) = Recorded By, (t) = Taken By, (c) = Cosigned By    Initials Name Effective Dates    HS Batool Bright MS CCC-SLP 04/13/15 -               OP SLP  Assessment/Plan - 02/09/17 1000     SLP Plan    Plan Comments Continue therapy. Anticipate repeat VFSS in approximately 2 weeks.   -HS      User Key  (r) = Recorded By, (t) = Taken By, (c) = Cosigned By    Initials Name Provider Type    HS Batool Bright MS CCC-SLP Speech and Language Pathologist        Time Calculation:   SLP Start Time: 0915  SLP Stop Time: 1000  SLP Time Calculation (min): 45 min    Therapy Charges for Today     Code Description Service Date Service Provider Modifiers Qty    [id number] HC ST TREATMENT SWALLOW 3 2/9/2017 Batool Bright MS CCC-SLP 59, GN 1            Batool Bright MS CCC-SLP  2/09/2017

## 2017-02-13 ENCOUNTER — OFFICE VISIT (OUTPATIENT)
Dept: GASTROENTEROLOGY | Facility: CLINIC | Age: 22
End: 2017-02-13

## 2017-02-13 ENCOUNTER — TELEPHONE (OUTPATIENT)
Dept: GASTROENTEROLOGY | Facility: CLINIC | Age: 22
End: 2017-02-13

## 2017-02-13 VITALS
HEIGHT: 61 IN | BODY MASS INDEX: 25.9 KG/M2 | SYSTOLIC BLOOD PRESSURE: 102 MMHG | DIASTOLIC BLOOD PRESSURE: 70 MMHG | WEIGHT: 137.2 LBS

## 2017-02-13 DIAGNOSIS — Z46.59 ENCOUNTER FOR CARE RELATED TO FEEDING TUBE: Primary | ICD-10-CM

## 2017-02-13 PROCEDURE — 99213 OFFICE O/P EST LOW 20 MIN: CPT | Performed by: INTERNAL MEDICINE

## 2017-02-13 NOTE — PROGRESS NOTES
Chief Complaint   Patient presents with   • GI Problem     Taking out feeding tube     Subjective     HPI  Juli Luna is a 21 y.o. female who presents for removal of feeding tube.  She has a complicated past medical history.  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  She had progressive worsening of her symptoms with dysphagia and paralysis. She subsequently had a PEG tube placed on September 3, 2016 by Dr. Ritter. She was unable to tolerate her secretions.   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      Since that time she has been in extensive rehabilitation.  She has been working with both PT, OT and speech therapy.  She reports that she has not required tube feedings since approximately December.  She has not used the PEG tube at all for the past 3 weeks.  Up into that point she was using it for water and medications.  She is on a nectar thickened liquid and regular diet.  She is taking all her medications by mouth.  Review of her notes indicates that she has upcoming VFSS to see if she can come off of nectar thickened liquids -- she thinks the 23rd of this month.  She remains with some weakness and dysarthria but his overall progressed well.  She is otherwise breathing well, eating and drinking well and her weight is up 137 pounds from 130 pounds in November.      Past Medical History   Diagnosis Date   • AVM (arteriovenous malformation)    • History of pneumonia        Social History     Social History   • Marital status: Single     Spouse name: N/A   • Number of children: N/A   • Years of education: N/A     Occupational History   • part-time retail/student      Social History Main Topics   • Smoking status: Never Smoker   • Smokeless tobacco: Never Used   • Alcohol use Yes      Comment: occ or socially   • Drug use: No   • Sexual activity: Not Asked     Other Topics Concern   • None      Social History Narrative         Current Outpatient Prescriptions:   •  acetaminophen (TYLENOL) 325 MG tablet, Take 2 tablets by mouth Every 4 (Four) Hours As Needed for headaches or fever (fever greater than 101.5 F). (Patient taking differently: 650 mg by Per G Tube route Every 4 (Four) Hours As Needed for headaches or fever (fever greater than 101.5 F).), Disp: , Rfl: 0  •  spironolactone (ALDACTONE) 50 MG tablet, 1 tablet by Per G Tube route 2 (Two) Times a Day., Disp: , Rfl:   •  cetirizine (ZyrTEC) 5 MG tablet, 1 tablet by Per G Tube route Daily. (Patient taking differently: 5 mg Daily.), Disp: 30 tablet, Rfl: 1    Review of Systems   Constitutional: Negative for activity change, appetite change and fatigue.   HENT: Negative for sore throat and trouble swallowing.    Respiratory: Negative.    Cardiovascular: Negative.    Gastrointestinal: Negative for abdominal distention, abdominal pain and blood in stool.   Endocrine: Negative for cold intolerance and heat intolerance.   Genitourinary: Negative for difficulty urinating, dysuria and frequency.   Musculoskeletal: Negative for arthralgias, back pain and myalgias.   Skin: Negative.    Hematological: Negative for adenopathy. Does not bruise/bleed easily.   All other systems reviewed and are negative.      Objective   Vitals:    02/13/17 1048   BP: 102/70     Last Weight    02/13/17  1048   Weight: 137 lb 3.2 oz (62.2 kg)     Body mass index is 25.92 kg/(m^2).      Physical Exam   Constitutional: She is oriented to person, place, and time. She appears well-developed and well-nourished. No distress.   HENT:   Head: Normocephalic and atraumatic.   Right Ear: External ear normal.   Left Ear: External ear normal.   Nose: Nose normal.   Mouth/Throat: Oropharynx is clear and moist. No oropharyngeal exudate.   Notable for some dysarthria   Eyes: Conjunctivae and EOM are normal. Right eye exhibits no discharge. Left eye exhibits no discharge. No scleral icterus.    Neck: Normal range of motion. Neck supple. No thyromegaly present.   No supraclavicular adenopathy   Cardiovascular: Normal rate, regular rhythm, normal heart sounds and intact distal pulses.  Exam reveals no gallop.    No murmur heard.  No lower extremity edema   Pulmonary/Chest: Effort normal and breath sounds normal. No respiratory distress. She has no wheezes.   Abdominal: Soft. Normal appearance and bowel sounds are normal. She exhibits no distension and no mass. There is no hepatosplenomegaly. There is no tenderness. There is no rigidity, no rebound and no guarding.   Genitourinary:   Genitourinary Comments: Rectal exam deferred   Musculoskeletal: Normal range of motion. She exhibits no edema or tenderness.   No atrophy of upper or lower extremities.  Normal digits and nails of both hands.   Lymphadenopathy:     She has no cervical adenopathy.   Neurological: She is alert and oriented to person, place, and time. She displays no atrophy. Coordination normal.   Skin: Skin is warm and dry. No rash noted. She is not diaphoretic. No erythema.   Psychiatric: She has a normal mood and affect. Her behavior is normal. Judgment and thought content normal.   Vitals reviewed.      WBC   Date Value Ref Range Status   09/25/2016 8.57 4.50 - 10.70 10*3/mm3 Final     RBC   Date Value Ref Range Status   09/25/2016 4.84 3.90 - 5.20 10*6/mm3 Final     HEMOGLOBIN   Date Value Ref Range Status   09/25/2016 12.3 11.9 - 15.5 g/dL Final     HEMATOCRIT   Date Value Ref Range Status   09/25/2016 38.6 35.6 - 45.5 % Final     MCV   Date Value Ref Range Status   09/25/2016 79.8 (L) 80.5 - 98.2 fL Final     MCH   Date Value Ref Range Status   09/25/2016 25.4 (L) 26.9 - 32.7 pg Final     MCHC   Date Value Ref Range Status   09/25/2016 31.9 (L) 32.4 - 36.3 g/dL Final     RDW   Date Value Ref Range Status   09/25/2016 16.3 (H) 11.7 - 13.0 % Final     RDW-SD   Date Value Ref Range Status   09/25/2016 46.4 37.0 - 54.0 fl Final     MPV   Date  Value Ref Range Status   09/25/2016 9.7 6.0 - 12.0 fL Final     PLATELETS   Date Value Ref Range Status   09/25/2016 325 140 - 500 10*3/mm3 Final     NEUTROPHIL %   Date Value Ref Range Status   09/25/2016 58.9 42.7 - 76.0 % Final     LYMPHOCYTE %   Date Value Ref Range Status   09/25/2016 29.1 19.6 - 45.3 % Final     MONOCYTE %   Date Value Ref Range Status   09/25/2016 9.6 5.0 - 12.0 % Final     EOSINOPHIL %   Date Value Ref Range Status   09/25/2016 2.0 0.3 - 6.2 % Final     BASOPHIL %   Date Value Ref Range Status   09/25/2016 0.2 0.0 - 1.5 % Final     IMMATURE GRANS %   Date Value Ref Range Status   09/25/2016 0.2 0.0 - 0.5 % Final     NEUTROPHILS, ABSOLUTE   Date Value Ref Range Status   09/25/2016 5.05 1.90 - 8.10 10*3/mm3 Final     LYMPHOCYTES, ABSOLUTE   Date Value Ref Range Status   09/25/2016 2.49 0.90 - 4.80 10*3/mm3 Final     MONOCYTES, ABSOLUTE   Date Value Ref Range Status   09/25/2016 0.82 0.20 - 1.20 10*3/mm3 Final     EOSINOPHILS, ABSOLUTE   Date Value Ref Range Status   09/25/2016 0.17 0.00 - 0.70 10*3/mm3 Final     BASOPHILS, ABSOLUTE   Date Value Ref Range Status   09/25/2016 0.02 0.00 - 0.20 10*3/mm3 Final     IMMATURE GRANS, ABSOLUTE   Date Value Ref Range Status   09/25/2016 0.02 0.00 - 0.03 10*3/mm3 Final       Lab Results   Component Value Date    GLUCOSE 89 10/25/2016    BUN 17 10/25/2016    CREATININE 0.64 10/25/2016    EGFRIFNONA 116 10/25/2016    EGFRIFAFRI  09/25/2016      Comment:      <15 Indicative of kidney failure.    BCR 26.6 (H) 10/25/2016    CO2 23.9 10/25/2016    CALCIUM 10.0 10/25/2016    ALBUMIN 5.10 08/29/2016    LABIL2 2.0 08/29/2016    AST 20 08/29/2016    ALT 17 08/29/2016         Imaging Results (last 7 days)     ** No results found for the last 168 hours. **            Assessment/Plan    Encounter for care related to feeding tube: Feeding tube has been present now for 5 months.  She no longer relies on it for tube feeds.  Additionally she has not been using it for  anything for the past 3 weeks.  She is still on a nectar thickened liquid diet with regular consistency foods.  Does have an upcoming VFSS to further evaluate if she can come off of nectar thickened liquids.  At this point I had a discussion with her and would like to wait until after she has been cleared for thin liquids prior to removing the PEG tube.  My fear is that if she has cannot progress to thin liquids that she may have issues with dehydration as we approached the warmer summer months.      Plan:  She is to let us know when she passes for thin liquids.  At that point we can just have her call the office and come in on any day that I am available and I will pull the tube without any appointment needed.  I will make my nurses is aware of this.  I discussed this with the patient and while she is a bit disappointed, she also has had this tube for 5 months and is in understanding and agreement.    Juli was seen today for gi problem.    Diagnoses and all orders for this visit:    Encounter for care related to feeding tube        EMR Dragon/Transcription disclaimer:       Much of this encounter note is an electronic transcription/translation of spoken language to printed text. The electronic translation of spoken language may permit erroneous, or at times, nonsensical words or phrases to be inadvertently transcribed; Although I have reviewed the note for such errors, some may still exist.

## 2017-02-13 NOTE — PATIENT INSTRUCTIONS
Call the office when speech has cleared you for thin liquids.  We can have you come in when I am in the office to pull the tube out at that time.    For any additional questions, concerns or changes to your condition after today's office visit please contact the office at [phone].

## 2017-02-13 NOTE — TELEPHONE ENCOUNTER
----- Message from Jodee Andrade MD sent at 2/13/2017 12:14 PM EST -----  Patient will give us a call when she has passed her VFSS for thin liquids.  At that point she can simply call and come in on a day that I am in the office.  I will pull her feeding tube in between seeing patients no appointment needed.  Thank you

## 2017-02-14 ENCOUNTER — HOSPITAL ENCOUNTER (OUTPATIENT)
Dept: SPEECH THERAPY | Facility: HOSPITAL | Age: 22
Setting detail: THERAPIES SERIES
Discharge: HOME | End: 2017-02-14

## 2017-02-14 ENCOUNTER — HOSPITAL ENCOUNTER (OUTPATIENT)
Dept: PHYSICAL THERAPY | Facility: HOSPITAL | Age: 22
Setting detail: THERAPIES SERIES
Discharge: HOME | End: 2017-02-14

## 2017-02-14 DIAGNOSIS — R13.12 OROPHARYNGEAL DYSPHAGIA: Primary | ICD-10-CM

## 2017-02-14 DIAGNOSIS — R26.9 ABNORMAL GAIT: Primary | ICD-10-CM

## 2017-02-14 DIAGNOSIS — R47.1 DYSARTHRIA: ICD-10-CM

## 2017-02-14 PROCEDURE — 97112 NEUROMUSCULAR REEDUCATION: CPT

## 2017-02-14 PROCEDURE — 92507 TX SP LANG VOICE COMM INDIV: CPT

## 2017-02-14 PROCEDURE — 92526 ORAL FUNCTION THERAPY: CPT

## 2017-02-14 NOTE — PROGRESS NOTES
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   • H/O craniotomy   • Episode of syncope   • Iron deficiency anemia due to chronic blood loss        Past Medical History   Diagnosis Date   • AVM (arteriovenous malformation)    • History of pneumonia         Past Surgical History   Procedure Laterality Date   • Endoscopy w/ peg tube placement N/A 9/3/2016     Procedure: ESOPHAGOGASTRODUODENOSCOPY WITH PERCUTANEOUS ENDOSCOPIC GASTROSTOMY TUBE INSERTION;  Surgeon: René Ritter MD;  Location: [facility] ENDOSCOPY;  Service:    • Craniotomy for tumor Left 2016     Procedure: Posterior fossa suboccipital craniotomy and removal of brain stem arteriovenous malformation;  Surgeon: Josias Regalado MD;  Location: [facility] OR;  Service:    • Tracheostomy N/A 2016     Procedure: TRACHEOSTOMY;  Surgeon: Chad Negron MD;  Location: [facility] OR;  Service:          Visit Dx:     ICD-10-CM ICD-9-CM   1. Abnormal gait R26.9 781.2                     PT Neuro       17 1150          Subjective Comments    Subjective Comments I take my driving test Friday.  -EF      Precautions and Contraindications    Precautions/Limitations fall precautions;swallowing precautions   nectar thick liquids  -EF      Precautions falls, PEG  -EF      Subjective Pain    Able to rate subjective pain? yes  -EF      Pre-Treatment Pain Level 0  -EF      Post-Treatment Pain Level 0  -EF      Transfers    Transfers, Sit-Stand [place] independent  -EF      Transfers, Stand-Sit [place] independent  -EF      Gait Assessment/Treatment    Gait, [place] Level conditional independence;independent  -EF       Gait, Distance (Feet) 600   x 1, 200 x 1  -EF      Gait, Gait Deviations --   slight ataxia, decreased trunk rotation & arm swing  -EF      Gait, Safety Issues weight-shifting ability decreased  -EF      Gait, Impairments strength decreased;impaired balance;coordination impaired;motor control impaired  -EF      Balance Skills Training    Standing-Level of Assistance Close supervision  -EF      Static Standing Balance Support Right upper extremity supported;oscar bar  -EF      Standing-Balance Activities Trampoline   static stance w/ narrow base,staggered stance,walk in place   -EF      Gait Balance-Level of Assistance Close supervision;Contact guard  -EF      Gait Balance Support No upper extremity supported  -EF      Gait Balance Activities braiding / front;side-stepping   front/back crossovers, braiding with UE support  -EF        User Key  (r) = Recorded By, (t) = Taken By, (c) = Cosigned By    Initials Name Provider Type    REZA Marshall PT Physical Therapist                        Therapy Education       02/14/17 1159    Therapy Education    Given Posture/body mechanics;Fall prevention and home safety;Mobility training  -EF    Program Reinforced  -EF    How Provided Verbal;Demonstration  -EF    Provided to Patient  -EF    Level of Understanding Teach back education performed;Verbalized;Demonstrated  -EF      User Key  (r) = Recorded By, (t) = Taken By, (c) = Cosigned By    Initials Name Provider Type    REZA Marshall PT Physical Therapist                PT OP Goals       02/14/17 1206 02/14/17 1100       PT Short Term Goals    STG 1  Pt Independent with initial home exercise program.  -EF     STG 1 Progress  Met  -EF     STG 2  Pt to ambulate to therapy sessions independently without assisitve device.  -EF     STG 2 Progress  Met  -EF     STG 3  Pt to ambulate with improved trunk rotation/arm swing 50% of time.  -EF     STG 3 Progress  Partially Met;Ongoing  -EF     STG 4  Pt to perform high  level balance exercises with cga.  -EF     STG 4 Progress  Met  -EF     STG 5  Pt to improve Dynamic Gait Index score to at least 18/24 to demonstrate improved gait and balance and decreased risk of falls.  -EF     STG 5 Progress  Met  -EF     Long Term Goals    LTG Date to Achieve  03/20/17  -EF     LTG 1  Pt Independent with advanced home program or community based exercise program to maintain/improve upon gains made in therapy.  -EF     LTG 1 Progress  Partially Met;Ongoing  -EF     LTG 2  Pt to ambulate in the community independently without an assistive device.  -EF     LTG 2 Progress  Met  -EF     LTG 3  Pt to ambulate without gait deviations.  -EF     LTG 3 Progress  Ongoing  -EF     LTG 4  Pt to perform high level balance exercises Independently/.  -EF     LTG 4 Progress  Ongoing  -EF     LTG 5  Pt to improve Dynamic Gait Index score to 24/24 to demonstrate improved balance & gait and decreased fall risk.  -EF     LTG 5 Progress  Ongoing  -EF     LTG 6  Pt able to return to work and/or school on at least a modified basis (part-time).  -EF     LTG 6 Progress  Ongoing  -EF     LTG 7  Patient to improve Dynamic Gait Index score to 21/24  -EF     LTG 7 Progress  Ongoing  -EF     Time Calculation    PT Goal Re-Cert Due Date 03/16/17  -EF        User Key  (r) = Recorded By, (t) = Taken By, (c) = Cosigned By    Initials Name Provider Type    REZA Marshall, PT Physical Therapist                PT Assessment/Plan       02/14/17 1201          PT Assessment    Functional Limitations Impaired gait;Performance in work activities  -EF      Impairments Balance;Coordination;Gait;Muscle strength  -EF      Assessment Comments Patient continues to demonstrate improving gait, balance, and endurance.  Her ataxia continues to decrease also.  She is taking a driving test this Friday & if she is able to start driving, she plans to go to the gym more often for workouts.  She will benefit from continued Physical Therapy to  work towards goals not yet achieved and towards increasing independence with eventual return to work and/or school.  -EF      Rehab Potential Good  -EF      Patient/caregiver participated in establishment of treatment plan and goals Yes  -EF      Patient would benefit from skilled therapy intervention Yes  -EF      PT Plan    PT Frequency 1x/week  -EF      Predicted Duration of Therapy Intervention (days/wks) 4 weeks  -EF      Physical Therapy Interventions (Optional Details) balance training;gait training;home exercise program;patient/family education;neuromuscular re-education;strengthening  -EF        User Key  (r) = Recorded By, (t) = Taken By, (c) = Cosigned By    Initials Name Provider Type    EF Shanna Marshall, PT Physical Therapist                   Exercises       02/14/17 1150          Subjective Comments    Subjective Comments I take my driving test Friday.  -EF      Subjective Pain    Able to rate subjective pain? yes  -EF      Pre-Treatment Pain Level 0  -EF      Post-Treatment Pain Level 0  -EF      Exercise 2    Exercise Name 2 step ups on green step w/ 1 pink section at hemibars fwd & sideways (sideways up & over)  -EF      Reps 2 20  -EF      Exercise 4    Exercise Name 4 Resisted walking with pt pushing roller table & therapist resisitng (120' x 2).  -EF      Reps 4 2   laps  -EF      Exercise 11    Exercise Name 11 dribbling basketball while walking & switching hands, changing directions  -EF      Reps 11 4   laps around gym  -EF      Exercise 13    Exercise Name 13 jumping jacks with LEs only with sba at hemibars  -EF      Reps 13 10  -EF      Exercise 19    Exercise Name 19 wall squats  -EF      Reps 19 15  -EF      Time (Seconds) 19 5 second hold  -EF        User Key  (r) = Recorded By, (t) = Taken By, (c) = Cosigned By    Initials Name Provider Type    EF Shanna Marshall, PT Physical Therapist                          Time Calculation:   Start Time: 1015  Stop Time: 1100  Time Calculation  (min): 45 min     Therapy Charges for Today     Code Description Service Date Service Provider Modifiers Qty    [id number] HC PT NEUROMUSC RE EDUCATION EA 15 MIN 2/14/2017 Shanna Marshall, PT GP 3                    Shanna Marshall, PT  2/14/2017

## 2017-02-14 NOTE — PROGRESS NOTES
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 Oropharyngeal dysphagia R13.12 787.22   2. Dysarthria R47.1 784.51           SLP OP Goals       17 1000    Subjective Comments    Subjective Comments Juli is highly motivated to participate in therapy. She is looking forward to her 's evaluation this Friday. She also reports follow up with GI yesterday. Her MD wants to wait until she is on thin liquids prior to removing her PEG. Repeat VFSS planned for 17.   -HS    Subjective Pain    Able to rate subjective pain? yes  -HS    Pre-Treatment Pain Level 0  -HS    Post-Treatment Pain Level 0  -HS    Dysarthria Goals    Patient will use verbal speech that is perceived as natural-sounding  by familiar/unfamiliar listeners without cues  -HS    Status: Patient will use verbal speech that is perceived as natural-sounding  by familiar/unfamiliar listeners Achieved  -HS    Comments: Patient will use verbal speech that is perceived as natural-sounding  by familiar/unfamiliar listeners Juli reports no difficulty communicating with both familiar and unfamiliar listeners. She is able to employ compensatory strategies without cues.   -HS    Patient will apply compensatory strategies to improve intelligibility of speech during in spontaneous speech 90%:;without cues  -HS    Status: Patient will apply compensatory strategies to improve intelligibility of speech during in spontaneous speech Achieved  -HS    Comments: Patient will apply  compensatory strategies to improve intelligibility of speech during in spontaneous speech Patient is 100% independent with compensatory strategies during conversational speech. Resonance continues to be affected but is improving.   -HS    Dysphagia Goals    Status: Patient will maintain nutrition/hydration via alternative means Achieved  -HS    Comments: Patient will maintain nutrition/hydration via alternative means Achieved 12/22/16. Patient will have PEG removed per MD recommendations.   -HS    Pt will tolerate some PO diet w/o aspiration to adequately meet nutrition/hydration needs Regular diet (no mixed consistencies) with Nectar Thick Liquids (with chin tuck).  -HS    Status: Pt will tolerate some PO diet w/o aspiration to adequately meet nutrition/hydration needs Progressing as expected  -HS    Comments: Pt will tolerate some PO diet w/o aspiration to adequately meet nutrition/hydration needs Patient currently tolerating her diet. She is independent with safe swallow strategies. Repeat VFSS planned for 2/23/17 to assess for possible upgrade.   -HS    Patient will increase hydration by tolerating water between meals after oral care Patient tolerates water without overt s/s of aspiration.   -HS    Status: Patient will increase hydration by tolerating water between meals after oral care Achieved  -HS    Patient will improve oral skills to enhance safety and increase eating efficiency by increasing accuracy of A-P tongue movements without cues  -HS    Status: Patient will improve oral skills to enhance safety and increase eating efficiency by increasing accuracy of A-P tongue movements Achieved  -HS    Comments: Patient will improve oral skills to enhance safety and increase eating efficiency by increasing accuracy of A-P tongue movements Achieved 1/19/17.   -HS    Patient will increase laryngeal elevation to reduce residue that might fall into airway by completing Mendelsohn maneuver;without cues;falsetto/pitch  glide   30 repetitions   -HS    Status: Patient will increase laryngeal elevation to reduce residue that might fall into airway by completing Progressing as expected   Partially Met  -HS    Comments: Patient will increase laryngeal elevation to reduce residue that might fall into airway by completing 30 repetitions without cues. Goal criteria met x2 sessions. Will continue goal for carryover/generalization x1 more session.  -HS    Patient will increase strength of tongue base and posterior pharyngeal walls to reduce residue that might fall into airway by completing Liza (tongue hold);without cues  -HS    Status: Patient will increase strength of tongue base and posterior pharyngeal walls to reduce residue that might fall into airway by completing Achieved  -HS    Comments: Patient will increase strength of tongue base and posterior pharyngeal walls to reduce residue that might fall into airway by completing Achieved 1/31/17.   -HS    Status: Patient will increase superior/anterior movement of hyolaryngeal complex to reduce residue which may fall into airway by using the Expiratory Muscle Strength Trainer Achieved  -HS    Comments: Patient will increase superior/anterior movement of hyolaryngeal complex to reduce residue which may fall into airway by using the Expiratory Muscle Strength Trainer Patient completes via HEP.   -HS    Patient will increase tipping of arytenoids and closure at entrance to the airway to reduce penetration into the vestibule by completing super-supraglottic swallow;without cues   30 repetitions   -HS    Status: Patient will increase tipping of arytenoids and closure at entrance to the airway to reduce penetration into the vestibule by completing Progressing as expected   Partially Met  -HS    Comments: Patient will increase tipping of arytenoids and closure at entrance to the airway to reduce penetration into the vestibule by completing 30 repetitions without cues. Goal criteria met x2  sessions. Will continue goal for carryover/generalization x1 more session.  -HS    Patient will compensate for oral/pharyngeal deficits and reduce risks while eating by utilizing  compensatory strategies chin down;controlled bolus size;without cues   Nectar thick liquids  -HS    Status: Patient will compensate for oral/pharyngeal deficits and reduce risks while eating by utilizing  compensatory strategies Achieved  -HS    Comments: Patient will compensate for oral/pharyngeal deficits and reduce risks while eating by utilizing  compensatory strategies Independent x3 sessions. Carryover/generalization met x3 sessions.   -HS    Other Goals    Other Adult Goal- 1 Patient will increase superior/anterior movement of the hyolaryngeal complex to improve swallow safety by triggering swallow at peak of amplitude on 100% of opportunities during neuromuscular electrical stimulation (NMES).  -HS    Status: Other Adult Goal- 1 Progressing as expected   Partially Met  -HS    Comments: Other Adult Goal- 1 Parameters: 50 Hz, 150 phase duration, 9 amps. Reduced phase duration and amplitude for stinging sensation. Triggered swallow at peak of amplitude with 95% accuracy without cues. Goal criteria met x2 sessions. Will continue goal for carryover/generalization x1 session.      -HS    SLP Time Calculation    SLP Goal Re-Cert Due Date 03/14/17  -HS      User Key  (r) = Recorded By, (t) = Taken By, (c) = Cosigned By    Initials Name Provider Type     Batool Bright MS CCC-SLP Speech and Language Pathologist              OP SLP Education       02/14/17 1000          Education    Barriers to Learning No barriers identified  -      Education Provided --   Plan of Care  -      Assessed Learning needs;Learning motivation;Learning preferences;Learning readiness  -      Learning Motivation Strong  -      Learning Method Explanation  -      Teaching Response Verbalized understanding  -        User Key  (r) = Recorded By, (t) =  Taken By, (c) = Cosigned By    Initials Name Effective Dates    TRAY Bright MS CCC-SLP 04/13/15 -               OP SLP Assessment/Plan - 02/14/17 1000     SLP Assessment    Functional Problems Swallowing;Speech Language- Adult/Cognition  -HS    Impact on Function: Adult Speech Language/Cognition Restrictions in personal and social life  -HS    Clinical Impression: Speech Language-Adult/Congnition Mild:;Dysarthria- Flaccid  -HS    Impact on Function: Swallowing Risk of aspiration;Impact on social aspects of eating  -HS    Clinical Impression: Swallowing Mild-Moderate:;oropharyngeal phase dysphagia  -HS    SLP Plan    Frequency 2x/week  -HS    Duration 4 weeks  -HS    Planned CPT's? SLP SWALLOW THERAPY: 04938;SLP INDIVIDUAL SPEECH THERAPY: 76536;SLP MBS: 02227  -HS    Expected Duration Therapy Session (min) 45-60 minutes  -HS    Plan Comments Continue therapy. Repeat VFSS planned for 2/23/17.  -HS      User Key  (r) = Recorded By, (t) = Taken By, (c) = Cosigned By    Initials Name Provider Type     Batool Bright MS CCC-SLP Speech and Language Pathologist           SLP Outcome Measures (last 72 hours)      SLP Outcome Measures       02/14/17 1000          SLP Outcome Measures    Outcome Measure Used? Adult NOMS  -HS      FCM Scores    FCM Chosen Swallowing;Motor Speech  -HS      Swallowing FCM Score 5  -HS      Motor Speech FCM Score 6  -HS        User Key  (r) = Recorded By, (t) = Taken By, (c) = Cosigned By    Initials Name Effective Dates    TRAY Bright MS CCC-SLP 04/13/15 -         Time Calculation:   SLP Start Time: 0915  SLP Stop Time: 1000  SLP Time Calculation (min): 45 min    Therapy Charges for Today     Code Description Service Date Service Provider Modifiers Qty    50152039064 HC ST TREATMENT SWALLOW 2 2/14/2017 Batool MS Kaila CCC-SLP 59, GN 1    65957723273 HC ST TREATMENT SPEECH 1 2/14/2017 Batool Bright MS CCC-SLP 59, GN 1            Batool Bright MS CCC-SLP  2/14/2017

## 2017-02-16 ENCOUNTER — HOSPITAL ENCOUNTER (OUTPATIENT)
Dept: OCCUPATIONAL THERAPY | Facility: HOSPITAL | Age: 22
Setting detail: THERAPIES SERIES
Discharge: HOME | End: 2017-02-16

## 2017-02-16 ENCOUNTER — TRANSCRIBE ORDERS (OUTPATIENT)
Dept: ADMINISTRATIVE | Facility: HOSPITAL | Age: 22
End: 2017-02-16

## 2017-02-16 ENCOUNTER — HOSPITAL ENCOUNTER (OUTPATIENT)
Dept: SPEECH THERAPY | Facility: HOSPITAL | Age: 22
Setting detail: THERAPIES SERIES
Discharge: HOME | End: 2017-02-16

## 2017-02-16 DIAGNOSIS — Z86.79 HISTORY OF INTRACRANIAL HEMORRHAGE: ICD-10-CM

## 2017-02-16 DIAGNOSIS — R13.12 OROPHARYNGEAL DYSPHAGIA: Primary | ICD-10-CM

## 2017-02-16 DIAGNOSIS — R27.8 COORDINATION IMPAIRMENTS: Primary | ICD-10-CM

## 2017-02-16 DIAGNOSIS — R53.1 DECREASED STRENGTH: ICD-10-CM

## 2017-02-16 DIAGNOSIS — R13.10 DYSPHAGIA, UNSPECIFIED TYPE: Primary | ICD-10-CM

## 2017-02-16 PROCEDURE — G8988 SELF CARE GOAL STATUS: HCPCS | Performed by: OCCUPATIONAL THERAPIST

## 2017-02-16 PROCEDURE — G8989 SELF CARE D/C STATUS: HCPCS | Performed by: OCCUPATIONAL THERAPIST

## 2017-02-16 PROCEDURE — 97110 THERAPEUTIC EXERCISES: CPT | Performed by: OCCUPATIONAL THERAPIST

## 2017-02-16 PROCEDURE — 92526 ORAL FUNCTION THERAPY: CPT

## 2017-02-16 PROCEDURE — 97112 NEUROMUSCULAR REEDUCATION: CPT | Performed by: OCCUPATIONAL THERAPIST

## 2017-02-16 NOTE — THERAPY DISCHARGE NOTE
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   • H/O craniotomy   • Episode of syncope   • Iron deficiency anemia due to chronic blood loss        Past Medical History   Diagnosis Date   • AVM (arteriovenous malformation)    • History of pneumonia         Past Surgical History   Procedure Laterality Date   • Endoscopy w/ peg tube placement N/A 9/3/2016     Procedure: ESOPHAGOGASTRODUODENOSCOPY WITH PERCUTANEOUS ENDOSCOPIC GASTROSTOMY TUBE INSERTION;  Surgeon: René Ritter MD;  Location: [facility] ENDOSCOPY;  Service:    • Craniotomy for tumor Left 2016     Procedure: Posterior fossa suboccipital craniotomy and removal of brain stem arteriovenous malformation;  Surgeon: Josias Regalado MD;  Location: [facility] OR;  Service:    • Tracheostomy N/A 2016     Procedure: TRACHEOSTOMY;  Surgeon: Chad Negron MD;  Location: [facility] OR;  Service:          Visit Dx:    ICD-10-CM ICD-9-CM   1. Coordination impairments R27.8 781.3   2. History of intracranial hemorrhage Z86.79 V12.59   3. Decreased strength M62.81 728.87             OT Neuro       17 1500          Sensation    Additional Comments Some RUE numbness  -SO      Coordination    Other Coordination Observations RUE ataxia with increase speed  -SO      Box and Blocks Left 66  -SO      Box and Blocks Right 62  -SO      9-Hole Peg Left 23  -SO      9-Hole Peg Right 18  -SO      ROM (Range of Motion)    General ROM Detail BUE WFL  -SO      MMT (Manual Muscle Testing)    General MMT Assessment Detail RUE 4+/5, LUE WFL  -SO      ADL Assessment/Intervention    ADL's Assessed? --   Independent  -SO         User Key  (r) = Recorded By, (t) = Taken By, (c) = Cosigned By    Initials Name Provider Type    RONEN Beach Occupational Therapist             Hand Therapy (last 24 hours)      Hand Josephine       02/16/17 1521           Strength Right    # Reps 3  -SO      Right Rung 2  -SO      Right  Test 1 25  -SO      Right  Test 2 25  -SO      Right  Test 3 26  -SO       Strength Average Right 25.33  -SO       Strength Left    # Reps 3  -SO      Left Rung 2  -SO      Left  Test 1 27  -SO      Left  Test 2 30  -SO      Left  Test 3 31  -SO       Strength Average Left 29.33  -SO      Right Hand Strength - Pinch (lbs)    Lateral 10 lbs  -SO      Tip (2 point) 5 lbs  -SO      Left Hand Strength - Pinch (lbs)    Lateral 10 lbs  -SO      Tip (2 point) 5 lbs  -SO        User Key  (r) = Recorded By, (t) = Taken By, (c) = Cosigned By    Initials Name Provider Type    RONEN Beach Occupational Therapist                    OT Assessment/Plan       02/16/17 1553 02/14/17 1201 02/09/17 1610    OT Assessment    Assessment Comments Pt has made progressin all UE functional areas. She is now performing all ADLs without assistance and will be taking her drivers test tomorrow to increase her independence with IADLs and hopefully return to work. Pt has increased her GMC/FMC, UE strength and endurance, AROM, abilty to carry 30# for job, and perform leisure activites at home. She has improved her DASH score to 12.93. Pt agreeable to d/c OT at this time as she has met her goals and is performing activites at home with no assistance. Pt encouraged to cont HEP given throughout therapy.  -SO  Pt cont to show increase in UE strength and endurance as well as FMC.  -SO    OT Plan    Predicted Duration of Therapy Intervention (days/wks)  4 weeks  -EF       User Key  (r) = Recorded By, (t) = Taken By, (c) = Cosigned By    Initials Name Provider Type    REZA Marshall, PT  Physical Therapist    SO Karen Gamble, OTR Occupational Therapist                 OT Goals       02/16/17 1500          OT Short Term Goals    STG 1 Pt. to be (I) with strengthening HEP.  -SO      STG 1 Progress Met  -SO      STG 2 Pt. and family to be independent with UE FMC HEP.  -SO      STG 2 Progress Met  -SO      STG 3 Patient will reach to right and left sides in standing with moderate to maximum challenge without loss of balance.  -SO      STG 3 Progress Met  -SO      STG 4 Patient and family to be independent with gross motor coordination home exercise program.  -SO      STG 4 Progress Met  -SO      STG 5 Patient will be instructed to RUE typing drills to work on fine motor coordination to type an e-mail using backspace key and mouse with RUE hand.  -SO      STG 5 Progress Met  -SO      Long Term Goals    LTG Date to Achieve 02/16/17  -SO      LTG 1 Pt to improve R  strength to 30# to increase independence.   -SO      LTG 1 Progress Met  -SO      LTG 2 Patient will improve dynamic standing balance to mod I with challenging task.  -SO      LTG 2 Progress Met  -SO      LTG 3 Patient will be able to complete laundry leval of assistance.  -SO      LTG 3 Progress Met  -SO      LTG 3 Progress Comments Per pt she has been going down in her basement and folding clothing  -SO      LTG 4 Patient will complete cleaning tasks ie. dusting, sweeping and general clean up tasks with mod I level of assistance.  -SO      LTG 4 Progress Met  -SO      LTG 5 Patient will be at Mod I level with simple meal preparation.  -SO      LTG 5 Progress Met  -SO      LTG 6 Patient to improve 9 hole peg test score to 25 seconds on affected side to demonstrate increased independence with fine motor tasks.  -SO      LTG 6 Progress Met  -SO      LTG 7 Pt. to improve Box and Blocks score to 62 blocks on affected side to demonstrate increased independence with daily tasks.  -SO      LTG 7 Progress Met  -SO      LTG 8 Pt to carry  30# object from various heights with no LOB to simulate work related task without AD.   -SO      LTG 8 Progress Met  -SO      Time Calculation    OT Goal Re-Cert Due Date 02/16/17  -SO        User Key  (r) = Recorded By, (t) = Taken By, (c) = Cosigned By    Initials Name Provider Type    RONEN Beach Occupational Therapist                Therapy Education       02/16/17 1521    Therapy Education    Given HEP   GC with focus on increasing speed and accuracy with movement.  -SO    Program Reinforced  -SO    How Provided Verbal;Demonstration  -SO    Provided to Patient  -SO    Level of Understanding Teach back education performed;Verbalized  -SO      02/14/17 1159    Therapy Education    Given Posture/body mechanics;Fall prevention and home safety;Mobility training  -EF    Program Reinforced  -EF    How Provided Verbal;Demonstration  -EF    Provided to Patient  -EF    Level of Understanding Teach back education performed;Verbalized;Demonstrated  -EF      User Key  (r) = Recorded By, (t) = Taken By, (c) = Cosigned By    Initials Name Provider Type    REZA Marshall, PT Physical Therapist    SO Karen Gamble OTR Occupational Therapist                    OT Exercises       02/16/17 1500          Subjective Comments    Subjective Comments Pt is prepared for d/c this date and is happy with progress made. She reports that she has her drivers evaluation and will be able to return to work after shie is able to drive.  -SO      Exercise 1    Exercise Name 1 [facility] exercises involving bouncing tennis ball with RUE with fair coordination and dealing cards with increased speed.  -SO        User Key  (r) = Recorded By, (t) = Taken By, (c) = Cosigned By    Initials Name Provider Type    RONEN Beach Occupational Therapist                    Outcome Measures       02/16/17 1500          DASH    Open a tight or new jar. 2  -SO      Write 2  -SO      Turn a key 2  -SO      Prepare a  meal 1  -SO      Push open a heavy door 1  -SO      Place an object on a shelf above your head 1  -SO      Do heavy household chores (e.g., wash walls, wash floors) 2  -SO      Garden or do yard work 2  -SO      Make a bed 1  -SO      Carry a shopping bag or briefcase 1  -SO      Carry a heavy object (over 10 lbs) 1  -SO      Change a lightbulb overhead 2  -SO      Wash or blow dry your hair 1  -SO      Wash your back 1  -SO      Put on a pullover sweater 1  -SO      Use a knife to cut food 1  -SO      Recreational activities in which require little effort (e.g., cardplaying, knitting, etc.) 1  -SO      Recreational activities in which you take some force or impact through your arm, should or hand (e.g. golf, hammering, tennis, etc.) 2  -SO      Recreational Activities in which you move your arm freely (e.g., frisbee, badminton, etc.) 2  -SO      Manage transportation needs (getting from one place to another) 2  -SO      During the past week, to what extent has your arm, shoulder, or hand problem interfered with your normal social activites with family, friends, neighbors or groups? 2  -SO      During the past week, were you limited in your work or other regular daily activities as a result of your arm, shoulder or hand problem? 2  -SO      Arm, Shoulder, or hand pain 1  -SO      Arm, shoulder or hand pain when you performed any specific activity 2  -SO      Tingling (pins and needles) in your arm, shoulder, or hand 2  -SO      Weakness in your arm, shoulder or hand 2  -SO      Stiffness in your arm, shoulder or hand 1  -SO      During the past week, how much difficulty have you had sleeping because of the pain in your arm, shoulder or hand? 1  -SO      I feel less capable, less confident or less useful because of my arm, shoulder or hand problem 2  -SO      DASH Sum  44  -SO      Number of Questions Answered 29  -SO      DASH Score 12.93  -SO        User Key  (r) = Recorded By, (t) = Taken By, (c) = Cosigned By     Initials Name Provider Type    SO RONEN Flores Occupational Therapist            Time Calculation:   OT Start Time: 1015  OT Stop Time: 1059  OT Time Calculation (min): 44 min     Therapy Charges for Today     Code Description Service Date Service Provider Modifiers Qty    50326371678 HC OT NEUROMUSC RE EDUCATION EA 15 MIN 2/16/2017 Karen Gamble OTR GO 2    29565185544 HC OT THER PROC EA 15 MIN 2/16/2017 Kaern Gamble OTR GO 1    99069811861 HC OT SELFCARE PROJECTED 2/16/2017 RONEN Flores GO, CI 1    24344612857 HC OT SELFCARE DISCHARGE 2/16/2017 RONEN Flores GO, CI 1          OT G-codes  OT Functional Scales Options: Quick DASH  Functional Assessment Tool Used: DASH score 12.93  Self Care Goal Status (): At least 1 percent but less than 20 percent impaired, limited or restricted  Self Care Discharge Status (): At least 1 percent but less than 20 percent impaired, limited or restricted     OP OT Discharge Summary  Date of Discharge: 02/16/17  Reason for Discharge: All goals achieved, Maximum functional potential achieved  Outcomes Achieved: Able to achieve all goals within established timeline  Discharge Destination: Home with home program  Discharge Instructions: Pt has been given HEP throughout for UE strengthenging, ROM, coordination, and balance to increase indepenence withADLs and IADLs.        Karen Gamble OTR  2/16/2017

## 2017-02-16 NOTE — PROGRESS NOTES
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 Oropharyngeal dysphagia R13.12 787.22           SLP OP Goals       17 0955       Subjective Comments Juli was pleasant and cooperative. She reports no new concerns/changes.   -HS       Able to rate subjective pain? yes  -HS    Pre-Treatment Pain Level 0  -HS    Post-Treatment Pain Level 0  -HS       Status: Patient will maintain nutrition/hydration via alternative means     Comments: Patient will maintain nutrition/hydration via alternative means     Pt will tolerate some PO diet w/o aspiration to adequately meet nutrition/hydration needs     Status: Pt will tolerate some PO diet w/o aspiration to adequately meet nutrition/hydration needs     Comments: Pt will tolerate some PO diet w/o aspiration to adequately meet nutrition/hydration needs     Patient will increase hydration by tolerating water between meals after oral care     Status: Patient will increase hydration by tolerating water between meals after oral care     Patient will improve oral skills to enhance safety and increase eating efficiency by increasing accuracy of A-P tongue movements without cues  -HS    Status: Patient will improve oral skills to enhance safety and increase eating efficiency by increasing accuracy of A-P tongue movements Achieved  -HS    Comments: Patient will improve oral skills to enhance safety and increase eating efficiency by increasing accuracy of A-P tongue movements Achieved 17.   -HS     Patient will increase laryngeal elevation to reduce residue that might fall into airway by completing Mendelsohn maneuver;without cues;falsetto/pitch glide   30 reps  -HS    Status: Patient will increase laryngeal elevation to reduce residue that might fall into airway by completing Achieved  -HS    Comments: Patient will increase laryngeal elevation to reduce residue that might fall into airway by completing 30 repetitions without cues. Goal criteria met x3 sessions. Patient to continue completion via home exercise program.  -HS    Patient will increase strength of tongue base and posterior pharyngeal walls to reduce residue that might fall into airway by completing Liza (tongue hold);without cues  -HS    Status: Patient will increase strength of tongue base and posterior pharyngeal walls to reduce residue that might fall into airway by completing Achieved  -HS    Comments: Patient will increase strength of tongue base and posterior pharyngeal walls to reduce residue that might fall into airway by completing Achieved 1/31/17.   -HS    Status: Patient will increase superior/anterior movement of hyolaryngeal complex to reduce residue which may fall into airway by using the Expiratory Muscle Strength Trainer Achieved  -HS    Comments: Patient will increase superior/anterior movement of hyolaryngeal complex to reduce residue which may fall into airway by using the Expiratory Muscle Strength Trainer Patient completes via HEP.   -HS    Patient will increase tipping of arytenoids and closure at entrance to the airway to reduce penetration into the vestibule by completing super-supraglottic swallow;without cues   30 reps  -HS    Status: Patient will increase tipping of arytenoids and closure at entrance to the airway to reduce penetration into the vestibule by completing Achieved  -HS    Comments: Patient will increase tipping of arytenoids and closure at entrance to the airway to reduce penetration into the vestibule by  completing 30 repetitions without cues. Goal criteriam met x3 sessions. Patient to continue completion via home exercise program.  -HS    Patient will compensate for oral/pharyngeal deficits and reduce risks while eating by utilizing  compensatory strategies chin down;controlled bolus size;without cues   Nectar thick liquids  -HS    Status: Patient will compensate for oral/pharyngeal deficits and reduce risks while eating by utilizing  compensatory strategies Achieved  -HS    Comments: Patient will compensate for oral/pharyngeal deficits and reduce risks while eating by utilizing  compensatory strategies Achieved 2/14/17.  -HS       Other Adult Goal- 1 Patient will increase superior/anterior movement of the hyolaryngeal complex to improve swallow safety by triggering swallow at peak of amplitude on 100% of opportunities during neuromuscular electrical stimulation (NMES).  -HS    Status: Other Adult Goal- 1 --   Did not target this session. Will complete next session.   -HS      User Key  (r) = Recorded By, (t) = Taken By, (c) = Cosigned By    Initials Name Provider Type    TRAY Bright MS CCC-SLP Speech and Language Pathologist              OP SLP Education       02/16/17 0936       Barriers to Learning No barriers identified  -HS    Education Provided --   Scheduled repeat VFSS.   -HS    Assessed     Learning Motivation Strong  -HS    Learning Method Explanation  -HS    Teaching Response Verbalized understanding  -HS      User Key  (r) = Recorded By, (t) = Taken By, (c) = Cosigned By    Initials Name Effective Dates    TRAY Bright MS CCC-SLP 04/13/15 -               OP SLP Assessment/Plan - 02/16/17 1517     SLP Plan    Plan Comments Continue therapy. Repeat VFSS next week.  -HS      User Key  (r) = Recorded By, (t) = Taken By, (c) = Cosigned By    Initials Name Provider Type    TRAY Bright MS CCC-SLP Speech and Language Pathologist           SLP Outcome Measures (last 72 hours)      SLP  Outcome Measures       02/14/17 1000          SLP Outcome Measures    Outcome Measure Used? Adult NOMS  -HS      FCM Scores    FCM Chosen Swallowing;Motor Speech  -HS      Swallowing FCM Score 5  -HS      Motor Speech FCM Score 6  -HS        User Key  (r) = Recorded By, (t) = Taken By, (c) = Cosigned By    Initials Name Effective Dates    HS Batool Bright MS CCC-SLP 04/13/15 -         Time Calculation:   SLP Start Time: 0915  SLP Stop Time: 1000  SLP Time Calculation (min): 45 min    Therapy Charges for Today     Code Description Service Date Service Provider Modifiers Qty    30994701060  ST TREATMENT SWALLOW 3 2/16/2017 Batool Bright MS CCC-SLP GN, 59 1            Batool Bright MS CCC-SLP  2/16/2017

## 2017-02-21 ENCOUNTER — HOSPITAL ENCOUNTER (OUTPATIENT)
Dept: SPEECH THERAPY | Facility: HOSPITAL | Age: 22
Setting detail: THERAPIES SERIES
Discharge: HOME | End: 2017-02-21

## 2017-02-21 ENCOUNTER — HOSPITAL ENCOUNTER (OUTPATIENT)
Dept: PHYSICAL THERAPY | Facility: HOSPITAL | Age: 22
Setting detail: THERAPIES SERIES
Discharge: HOME | End: 2017-02-21

## 2017-02-21 DIAGNOSIS — R13.12 OROPHARYNGEAL DYSPHAGIA: Primary | ICD-10-CM

## 2017-02-21 DIAGNOSIS — R26.9 ABNORMAL GAIT: Primary | ICD-10-CM

## 2017-02-21 PROCEDURE — 97112 NEUROMUSCULAR REEDUCATION: CPT

## 2017-02-21 PROCEDURE — 92526 ORAL FUNCTION THERAPY: CPT

## 2017-02-21 NOTE — PROGRESS NOTES
Outpatient Physical Therapy Neuro Treatment Note  [facility]     Patient Name: Juli Luna  : 1995  MRN: [id number]  Today's Date: 2017      Visit Date: 2017    Visit Dx:    ICD-10-CM ICD-9-CM   1. 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  I have a swallowing test Thursday.  -EF      Precautions and Contraindications    Precautions/Limitations fall precautions;swallowing precautions   nectar thick liquids  -EF      Precautions falls, PEG  -EF      Subjective Pain    Able to rate subjective pain? yes  -EF      Pre-Treatment Pain Level 0  -EF      Post-Treatment Pain Level 0  -EF      Transfers    Transfers, Sit-Stand Barren independent  -EF      Transfers, Stand-Sit Barren independent  -EF      Gait Assessment/Treatment    Gait, Barren Level conditional independence;independent  -EF      Gait, Distance (Feet) 600   x 1, 200 x 1  -EF      Gait, Gait Deviations --   slight ataxia, decreased trunk rotation & armswing  -EF      Gait, Safety Issues weight-shifting ability decreased  -EF      Gait, Impairments strength decreased;impaired balance;coordination impaired;motor control impaired  -EF      Gait, Comment Worked on faster paced walking with emphasis on trunk rotation during walking ~600'.  -EF      Balance Skills Training    Standing-Level of Assistance Close supervision;Contact guard  -EF      Static Standing Balance Support Left upper extremity supported;oscar bar   L UE as needed  -EF      Standing-Balance Activities Weight Shift R-L;Single Limb Stance;Tandem Stance   Blue  Foam (SLS, SLS w/ knee bend,ankle p/d flex, eyes closed  -EF      Gait Balance-Level of Assistance Close supervision;Contact guard  -EF      Gait Balance Support No upper extremity supported  -EF      Gait Balance Activities braiding / front;side-stepping;tandem   front/back crossovers & braiding with UE support  -EF        User Key  (r) = Recorded By, (t) = Taken By, (c) = Cosigned By    Initials Name Provider Type    REZA Marshall, PT Physical Therapist                        PT Assessment/Plan       02/21/17 1341 02/21/17 1338       PT Assessment    Assessment Comments  All exercises today performed with 1# cuff weight on each LE.  Pt is challenged by balance exercises such as crossovers, braiding, SLS, jumping, and tandem walking.    -EF     PT Plan    PT Frequency  1x/week  -EF     PT Plan Comments Continue with Plan of Care.  -EF        User Key  (r) = Recorded By, (t) = Taken By, (c) = Cosigned By    Initials Name Provider Type    REZA Marshall, PT Physical Therapist                     Exercises       02/21/17 1313          Subjective Comments    Subjective Comments I passed my driving test!  I have a swallowing test Thursday.  -EF      Subjective Pain    Able to rate subjective pain? yes  -EF      Pre-Treatment Pain Level 0  -EF      Post-Treatment Pain Level 0  -EF      Exercise 1    Exercise Name 1 squat walking forward, backward, sideways with sba  -EF      Exercise 7    Exercise Name 7 alt touches on step with sba without UE support  -EF      Reps 7 25  -EF      Exercise 8    Exercise Name 8 tandem walking on blue foam balance beam (fwd, back, sideways)  at hemibars with sba  -EF      Exercise 10    Exercise Name 10 Vertical jumps with UE support with sba at hemibars  -EF      Reps 10 10  -EF      Exercise 11    Exercise Name 11 dribbling basketball while walking & switching hands, changing directions, dribbling while sidestepping L & R  -EF      Reps 11 5   laps around gym  -EF       Exercise 12    Exercise Name 12 Walking on heels & toes at hemibars with sba  -EF      Reps 12 5   laps each  -EF      Exercise 13    Exercise Name 13 jumping jacks using LEs only with UE support with sba at hemibars  -EF      Reps 13 10  -EF      Exercise 16    Exercise Name 16 slow marching around gym   -EF      Weights/Plates 16 1   1# on each LE  -EF      Reps 16 2   laps  -EF      Exercise 20    Exercise Name 20 alt hip flexion with back against wall with right side to counter to use for support as needed  -EF      Reps 20 10  -EF        User Key  (r) = Recorded By, (t) = Taken By, (c) = Cosigned By    Initials Name Provider Type    EF Shanna Marshall, PT Physical Therapist                              PT OP Goals       02/14/17 1206 02/14/17 1100       PT Short Term Goals    STG 1  Pt Independent with initial home exercise program.  -EF     STG 1 Progress  Met  -EF     STG 2  Pt to ambulate to therapy sessions independently without assisitve device.  -EF     STG 2 Progress  Met  -EF     STG 3  Pt to ambulate with improved trunk rotation/arm swing 50% of time.  -EF     STG 3 Progress  Partially Met;Ongoing  -EF     STG 4  Pt to perform high level balance exercises with cga.  -EF     STG 4 Progress  Met  -EF     STG 5  Pt to improve Dynamic Gait Index score to at least 18/24 to demonstrate improved gait and balance and decreased risk of falls.  -EF     STG 5 Progress  Met  -EF     Long Term Goals    LTG Date to Achieve  03/20/17  -EF     LTG 1  Pt Independent with advanced home program or community based exercise program to maintain/improve upon gains made in therapy.  -EF     LTG 1 Progress  Partially Met;Ongoing  -EF     LTG 2  Pt to ambulate in the community independently without an assistive device.  -EF     LTG 2 Progress  Met  -EF     LTG 3  Pt to ambulate without gait deviations.  -EF     LTG 3 Progress  Ongoing  -EF     LTG 4  Pt to perform high level balance exercises Independently/.  -EF     LTG 4  Progress  Ongoing  -EF     LTG 5  Pt to improve Dynamic Gait Index score to 24/24 to demonstrate improved balance & gait and decreased fall risk.  -EF     LTG 5 Progress  Ongoing  -EF     LTG 6  Pt able to return to work and/or school on at least a modified basis (part-time).  -EF     LTG 6 Progress  Ongoing  -EF     LTG 7  Patient to improve Dynamic Gait Index score to 21/24  -EF     LTG 7 Progress  Ongoing  -EF     Time Calculation    PT Goal Re-Cert Due Date 03/16/17  -EF        User Key  (r) = Recorded By, (t) = Taken By, (c) = Cosigned By    Initials Name Provider Type    REZA Marshall PT Physical Therapist                Therapy Education       02/21/17 1338    Therapy Education    Given HEP;Posture/body mechanics;Fall prevention and home safety;Mobility training  -EF    Program Progressed  -EF    How Provided Verbal;Demonstration  -EF    Provided to Patient  -EF    Level of Understanding Teach back education performed;Verbalized;Demonstrated  -EF      02/16/17 1521    Therapy Education    Given HEP   GCM with focus on increasing speed and accuracy with movement.  -SO    Program Reinforced  -SO    How Provided Verbal;Demonstration  -SO    Provided to Patient  -SO    Level of Understanding Teach back education performed;Verbalized  -SO      User Key  (r) = Recorded By, (t) = Taken By, (c) = Cosigned By    Initials Name Provider Type    REZA Marshall PT Physical Therapist    SO Karen Gamble, OTR Occupational Therapist                Time Calculation:   Start Time: 1015  Stop Time: 1100  Time Calculation (min): 45 min     Therapy Charges for Today     Code Description Service Date Service Provider Modifiers Qty    [id number]  PT NEUROMUSC RE EDUCATION EA 15 MIN 2/21/2017 Shanna Marshall PT GP 3                    Shanna Marshall PT  2/21/2017

## 2017-02-22 NOTE — PROGRESS NOTES
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 Oropharyngeal dysphagia R13.12 787.22           SLP OP Goals       17 1000       Subjective Comments Juli was excited as she passed her 's evaluation last Friday. She is looking forward to her repeat VFSS on Thursday.   -HS       Able to rate subjective pain? yes  -HS    Pre-Treatment Pain Level 0  -HS    Post-Treatment Pain Level 0  -HS       Status: Patient will use verbal speech that is perceived as natural-sounding  by familiar/unfamiliar listeners Achieved  -HS    Patient will apply compensatory strategies to improve intelligibility of speech during in spontaneous speech 90%:;without cues  -HS    Status: Patient will apply compensatory strategies to improve intelligibility of speech during in spontaneous speech Achieved  -HS    Comments: Patient will apply compensatory strategies to improve intelligibility of speech during in spontaneous speech Achieved 17.  -HS       Status: Patient will maintain nutrition/hydration via alternative means Achieved  -HS    Comments: Patient will maintain nutrition/hydration via alternative means Achieved 16. Patient will have PEG removed per MD recommendations.   -HS    Patient will increase hydration by tolerating water between meals after oral care Patient tolerates water without overt s/s of aspiration.   -HS    Status: Patient will increase hydration by tolerating water between meals after oral care Achieved  -HS    Patient will  improve oral skills to enhance safety and increase eating efficiency by increasing accuracy of A-P tongue movements without cues  -HS    Status: Patient will improve oral skills to enhance safety and increase eating efficiency by increasing accuracy of A-P tongue movements Achieved  -HS    Comments: Patient will improve oral skills to enhance safety and increase eating efficiency by increasing accuracy of A-P tongue movements Achieved 1/19/17.   -HS    Patient will increase laryngeal elevation to reduce residue that might fall into airway by completing Mendelsohn maneuver;without cues;falsetto/pitch glide   30 reps  -HS    Status: Patient will increase laryngeal elevation to reduce residue that might fall into airway by completing Achieved  -HS    Comments: Patient will increase laryngeal elevation to reduce residue that might fall into airway by completing Achieved 2/16/17.  -HS    Patient will increase strength of tongue base and posterior pharyngeal walls to reduce residue that might fall into airway by completing Liza (tongue hold);without cues  -HS    Status: Patient will increase strength of tongue base and posterior pharyngeal walls to reduce residue that might fall into airway by completing Achieved  -HS    Comments: Patient will increase strength of tongue base and posterior pharyngeal walls to reduce residue that might fall into airway by completing Achieved 1/31/17.   -HS    Status: Patient will increase superior/anterior movement of hyolaryngeal complex to reduce residue which may fall into airway by using the Expiratory Muscle Strength Trainer Achieved  -HS    Comments: Patient will increase superior/anterior movement of hyolaryngeal complex to reduce residue which may fall into airway by using the Expiratory Muscle Strength Trainer Patient completes via HEP.   -HS    Patient will increase tipping of arytenoids and closure at entrance to the airway to reduce penetration into the vestibule by  completing super-supraglottic swallow;without cues   30 reps  -HS    Status: Patient will increase tipping of arytenoids and closure at entrance to the airway to reduce penetration into the vestibule by completing Achieved  -HS    Comments: Patient will increase tipping of arytenoids and closure at entrance to the airway to reduce penetration into the vestibule by completing Achieved 2/16/17.   -HS    Patient will compensate for oral/pharyngeal deficits and reduce risks while eating by utilizing  compensatory strategies chin down;controlled bolus size;without cues   NTL  -HS    Status: Patient will compensate for oral/pharyngeal deficits and reduce risks while eating by utilizing  compensatory strategies Achieved  -HS    Comments: Patient will compensate for oral/pharyngeal deficits and reduce risks while eating by utilizing  compensatory strategies Achieved 2/14/17.  -HS       Other Adult Goal- 1 Patient will increase superior/anterior movement of the hyolaryngeal complex to improve swallow safety by triggering swallow at peak of amplitude on 100% of opportunities during neuromuscular electrical stimulation (NMES).  -HS    Status: Other Adult Goal- 1 Achieved  -HS    Comments: Other Adult Goal- 1 Parameters: 50 Hz, 125 phase duration, 7 amps. Reduced phase duration and amplitude due to patient reported stinging sensation at higher levels. Triggered swallow at peak of amplitude with 100% accuracy without cues. Goal criteria met x3 sessions.      -HS      User Key  (r) = Recorded By, (t) = Taken By, (c) = Cosigned By    Initials Name Provider Type    TRAY Bright MS CCC-SLP Speech and Language Pathologist              OP SLP Education       02/21/17 1000       Barriers to Learning No barriers identified  -HS    Education Provided --   Further POC/recommendations pending results of VFSS 02/23/17.   -HS      User Key  (r) = Recorded By, (t) = Taken By, (c) = Cosigned By    Initials Name Effective Dates    HS  Batool Bright MS CCC-SLP 04/13/15 -               OP SLP Assessment/Plan - 02/21/17 1000     SLP Plan    Plan Comments Repeat VFSS 2/23/17. Further POC/recommendations pending results.   -HS      User Key  (r) = Recorded By, (t) = Taken By, (c) = Cosigned By    Initials Name Provider Type    HS Batool Bright MS CCC-SLP Speech and Language Pathologist        Time Calculation:   SLP Start Time: 0915  SLP Stop Time: 1000  SLP Time Calculation (min): 45 min    Therapy Charges for Today     Code Description Service Date Service Provider Modifiers Qty    [id number] HC ST TREATMENT SWALLOW 3 2/21/2017 Batool Bright MS CCC-SLP 59, GN 1          Batool Bright MS CCC-SLP  2/21/2017

## 2017-02-23 ENCOUNTER — APPOINTMENT (OUTPATIENT)
Dept: SPEECH THERAPY | Facility: HOSPITAL | Age: 22
End: 2017-02-23

## 2017-02-23 ENCOUNTER — HOSPITAL ENCOUNTER (OUTPATIENT)
Dept: GENERAL RADIOLOGY | Facility: HOSPITAL | Age: 22
Discharge: HOME | End: 2017-02-23
Attending: PHYSICAL MEDICINE & REHABILITATION | Admitting: PHYSICAL MEDICINE & REHABILITATION

## 2017-02-23 ENCOUNTER — DOCUMENTATION (OUTPATIENT)
Dept: GASTROENTEROLOGY | Facility: CLINIC | Age: 22
End: 2017-02-23

## 2017-02-23 ENCOUNTER — APPOINTMENT (OUTPATIENT)
Dept: OCCUPATIONAL THERAPY | Facility: HOSPITAL | Age: 22
End: 2017-02-23

## 2017-02-23 DIAGNOSIS — R13.10 DYSPHAGIA, UNSPECIFIED TYPE: ICD-10-CM

## 2017-02-23 PROCEDURE — 92611 MOTION FLUOROSCOPY/SWALLOW: CPT | Performed by: SPEECH-LANGUAGE PATHOLOGIST

## 2017-02-23 PROCEDURE — 74230 X-RAY XM SWLNG FUNCJ C+: CPT

## 2017-02-23 NOTE — PROGRESS NOTES
Pt stopped by following passing her VFSS-- cleared for all liquids.    I pulled her PEG tube without difficulty.  Gauze and tape applied.    She is to monitor the site for infection, wash it daily with mild soap and water and to let me know if she has any difficulty.    Advised nothing to eat or drink for the next 4 hours.    She tolerated the procedure well, denied pain following the removal.  Ambulated on her on, asked if ok to drive and go to [employer] and I gave her the all-clear.

## 2017-02-24 NOTE — PROGRESS NOTES
Outpatient Speech Language Pathology   Adult Swallow Initial Evaluation-VFSS  [facility]     Patient Name: Juli Luna  : 1995  MRN: [id number]  Today's Date: 2017         Visit Date: 2017     SPEECH-LANGUAGE PATHOLOGY EVALUTION - VFSS  Subjective: The patient was seen on this date for a VFSS(Videofluoroscopic Swallowing Study).  Patient was alert and cooperative.    Objective: Risks/benefits were reviewed with the patient and family, and consent was obtained. The study was completed with SLP present and Radiologist review. The patient was seen in lateral view(s). Textures given included thin liquid, puree consistency, mechanical soft consistency and regular consistency.  Assessment:  Pt demonstrated transient penetration of thin liquid without chin tuck with trace aspiration x1 with thin by straw with chin tuck when taken in succession.  No penetration or aspiration thin with chin tuck when taken in small single sips. Trace to mild oral residue which cleared with independent double swallow or liquid wash.  No pharyngeal residue.  SLP Findings: Patient presents with mild oropharyngeal dysphagia.   Recommendations: Diet Textures: thin liquid, regular consistency food. Medications should be taken whole with thin liquids.   Recommended Strategies: Upright for PO, small bites and sips and double swallow with food /drink as needed.. Oral care before breakfast, after all meals and PRN.            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   • H/O craniotomy   • Episode of syncope   • Iron deficiency anemia due to chronic blood loss        Past Medical History   Diagnosis Date   • AVM (arteriovenous malformation)    • History of pneumonia         Past Surgical History   Procedure Laterality Date   • Endoscopy w/ peg tube placement N/A 9/3/2016      Procedure: ESOPHAGOGASTRODUODENOSCOPY WITH PERCUTANEOUS ENDOSCOPIC GASTROSTOMY TUBE INSERTION;  Surgeon: René Ritter MD;  Location: [facility] ENDOSCOPY;  Service:    • Craniotomy for tumor Left 9/7/2016     Procedure: Posterior fossa suboccipital craniotomy and removal of brain stem arteriovenous malformation;  Surgeon: Josias Regalado MD;  Location: [facility] MAIN OR;  Service:    • Tracheostomy N/A 9/8/2016     Procedure: TRACHEOSTOMY;  Surgeon: Chad Negron MD;  Location: [facility] MAIN OR;  Service:          Visit Dx:     ICD-10-CM ICD-9-CM   1. Dysphagia, unspecified type R13.10 787.20                   Adult Dysphagia - 02/23/17 1858     Adult Dysphagia Background    Current Diet (Solids) Regular  -SA    Diet Level (Liquids) Nectar Thick Liquid  -SA    SLP Communication to Radiology    Severity Level of Dysphagia mild dysphagia  -SA    Consistencies Aspirated/Penetrated aspirated and penetrated:;thin  -SA    Summary Statement Pt demonstrated transient penetration of thin liquid without chin tuck with trace aspiration x1 with thin by straw with chin tuck when taken in succession.  No penetration or aspiration thin with chin tuck when taken in small single sips. Trace to mild oral residue which cleared with independent double swallow or liquid wash.  No pharyngeal residue.    -SA      User Key  (r) = Recorded By, (t) = Taken By, (c) = Cosigned By    Initials Name Provider Type    SA Sveta Glez MS CCC-SLP Speech and Language Pathologist                            OP SLP Education       02/23/17 1911    Education    Barriers to Learning No barriers identified  -    Education Provided Described results of evaluation;Patient expressed understanding of evaluation;Family/caregivers expressed understanding of evaluation  -SA    Assessed Learning needs;Learning motivation  -SA    Learning Motivation Strong  -    Learning Method Explanation  -    Teaching Response Verbalized understanding  -      User Key   (r) = Recorded By, (t) = Taken By, (c) = Cosigned By    Initials Name Effective Dates    SA Sveta Glez MS CCC-SLP 04/13/15 -                     OP SLP Assessment/Plan - 02/23/17 1909     SLP Assessment    Functional Problems Swallowing  -SA      User Key  (r) = Recorded By, (t) = Taken By, (c) = Cosigned By    Initials Name Provider Type    SA Sveta Glez MS CCC-SLP Speech and Language Pathologist              SLP Outcome Measures (last 72 hours)      SLP Outcome Measures       02/23/17 1911          SLP Outcome Measures    Outcome Measure Used? Adult NOMS  -      FCM Scores    Swallowing FCM Score 6  -SA        User Key  (r) = Recorded By, (t) = Taken By, (c) = Cosigned By    Initials Name Effective Dates    SA Sveta Glez MS CCC-SLP 04/13/15 -                Time Calculation:   SLP Start Time: 1100  SLP Stop Time: 1200  SLP Time Calculation (min): 60 min    Therapy Charges for Today     Code Description Service Date Service Provider Modifiers Qty    [id number] HC ST MOTION FLUORO EVAL SWALLOW 4 2/23/2017 Sveta Glez MS CCC-SLP GN 1                   Sveta Glez MS CCC-SLP  2/23/2017

## 2017-02-28 ENCOUNTER — HOSPITAL ENCOUNTER (OUTPATIENT)
Dept: PHYSICAL THERAPY | Facility: HOSPITAL | Age: 22
Setting detail: THERAPIES SERIES
Discharge: HOME | End: 2017-02-28

## 2017-02-28 ENCOUNTER — HOSPITAL ENCOUNTER (OUTPATIENT)
Dept: SPEECH THERAPY | Facility: HOSPITAL | Age: 22
Setting detail: THERAPIES SERIES
Discharge: HOME | End: 2017-02-28

## 2017-02-28 DIAGNOSIS — R13.12 OROPHARYNGEAL DYSPHAGIA: Primary | ICD-10-CM

## 2017-02-28 DIAGNOSIS — R26.9 ABNORMAL GAIT: Primary | ICD-10-CM

## 2017-02-28 PROCEDURE — 97112 NEUROMUSCULAR REEDUCATION: CPT

## 2017-02-28 PROCEDURE — 92507 TX SP LANG VOICE COMM INDIV: CPT

## 2017-03-02 ENCOUNTER — APPOINTMENT (OUTPATIENT)
Dept: SPEECH THERAPY | Facility: HOSPITAL | Age: 22
End: 2017-03-02

## 2017-03-02 ENCOUNTER — APPOINTMENT (OUTPATIENT)
Dept: OCCUPATIONAL THERAPY | Facility: HOSPITAL | Age: 22
End: 2017-03-02

## 2017-03-07 ENCOUNTER — APPOINTMENT (OUTPATIENT)
Dept: PHYSICAL THERAPY | Facility: HOSPITAL | Age: 22
End: 2017-03-07

## 2017-03-07 ENCOUNTER — APPOINTMENT (OUTPATIENT)
Dept: SPEECH THERAPY | Facility: HOSPITAL | Age: 22
End: 2017-03-07

## 2017-03-09 ENCOUNTER — APPOINTMENT (OUTPATIENT)
Dept: SPEECH THERAPY | Facility: HOSPITAL | Age: 22
End: 2017-03-09

## 2017-03-14 ENCOUNTER — APPOINTMENT (OUTPATIENT)
Dept: SPEECH THERAPY | Facility: HOSPITAL | Age: 22
End: 2017-03-14

## 2017-03-14 ENCOUNTER — APPOINTMENT (OUTPATIENT)
Dept: PHYSICAL THERAPY | Facility: HOSPITAL | Age: 22
End: 2017-03-14

## 2017-03-16 ENCOUNTER — APPOINTMENT (OUTPATIENT)
Dept: SPEECH THERAPY | Facility: HOSPITAL | Age: 22
End: 2017-03-16

## 2017-03-21 ENCOUNTER — APPOINTMENT (OUTPATIENT)
Dept: PHYSICAL THERAPY | Facility: HOSPITAL | Age: 22
End: 2017-03-21

## 2017-03-21 ENCOUNTER — APPOINTMENT (OUTPATIENT)
Dept: SPEECH THERAPY | Facility: HOSPITAL | Age: 22
End: 2017-03-21

## 2017-03-23 ENCOUNTER — APPOINTMENT (OUTPATIENT)
Dept: SPEECH THERAPY | Facility: HOSPITAL | Age: 22
End: 2017-03-23

## 2017-03-28 ENCOUNTER — APPOINTMENT (OUTPATIENT)
Dept: PHYSICAL THERAPY | Facility: HOSPITAL | Age: 22
End: 2017-03-28

## 2017-03-28 ENCOUNTER — APPOINTMENT (OUTPATIENT)
Dept: SPEECH THERAPY | Facility: HOSPITAL | Age: 22
End: 2017-03-28

## 2017-03-30 ENCOUNTER — APPOINTMENT (OUTPATIENT)
Dept: SPEECH THERAPY | Facility: HOSPITAL | Age: 22
End: 2017-03-30

## 2017-04-04 ENCOUNTER — APPOINTMENT (OUTPATIENT)
Dept: PHYSICAL THERAPY | Facility: HOSPITAL | Age: 22
End: 2017-04-04

## 2017-04-04 ENCOUNTER — APPOINTMENT (OUTPATIENT)
Dept: SPEECH THERAPY | Facility: HOSPITAL | Age: 22
End: 2017-04-04

## 2017-04-06 ENCOUNTER — APPOINTMENT (OUTPATIENT)
Dept: SPEECH THERAPY | Facility: HOSPITAL | Age: 22
End: 2017-04-06

## 2017-04-11 ENCOUNTER — APPOINTMENT (OUTPATIENT)
Dept: PHYSICAL THERAPY | Facility: HOSPITAL | Age: 22
End: 2017-04-11

## 2017-04-11 ENCOUNTER — APPOINTMENT (OUTPATIENT)
Dept: SPEECH THERAPY | Facility: HOSPITAL | Age: 22
End: 2017-04-11

## 2017-04-18 ENCOUNTER — APPOINTMENT (OUTPATIENT)
Dept: SPEECH THERAPY | Facility: HOSPITAL | Age: 22
End: 2017-04-18

## 2017-04-18 ENCOUNTER — APPOINTMENT (OUTPATIENT)
Dept: PHYSICAL THERAPY | Facility: HOSPITAL | Age: 22
End: 2017-04-18

## 2017-04-20 ENCOUNTER — APPOINTMENT (OUTPATIENT)
Dept: SPEECH THERAPY | Facility: HOSPITAL | Age: 22
End: 2017-04-20

## 2017-04-25 ENCOUNTER — APPOINTMENT (OUTPATIENT)
Dept: SPEECH THERAPY | Facility: HOSPITAL | Age: 22
End: 2017-04-25

## 2017-04-25 ENCOUNTER — APPOINTMENT (OUTPATIENT)
Dept: PHYSICAL THERAPY | Facility: HOSPITAL | Age: 22
End: 2017-04-25

## 2017-05-17 ENCOUNTER — TELEPHONE (OUTPATIENT)
Dept: NEUROSURGERY | Facility: CLINIC | Age: 22
End: 2017-05-17

## 2017-05-17 RX ORDER — DIAZEPAM 10 MG/1
TABLET ORAL
Qty: 1 TABLET | Refills: 0 | OUTPATIENT
Start: 2017-05-17 | End: 2017-06-02

## 2017-05-22 ENCOUNTER — HOSPITAL ENCOUNTER (OUTPATIENT)
Dept: MRI IMAGING | Facility: HOSPITAL | Age: 22
Discharge: HOME | End: 2017-05-22
Admitting: NURSE PRACTITIONER

## 2017-05-22 DIAGNOSIS — Z86.79 HISTORY OF INTRACRANIAL HEMORRHAGE: ICD-10-CM

## 2017-05-22 DIAGNOSIS — Q28.2 ARTERIOVENOUS MALFORMATION OF BRAIN: ICD-10-CM

## 2017-05-22 DIAGNOSIS — Z98.890 H/O CRANIOTOMY: ICD-10-CM

## 2017-05-22 PROCEDURE — A9577 INJ MULTIHANCE: HCPCS | Performed by: NURSE PRACTITIONER

## 2017-05-22 PROCEDURE — 70553 MRI BRAIN STEM W/O & W/DYE: CPT

## 2017-05-22 PROCEDURE — 0 GADOBENATE DIMEGLUMINE 529 MG/ML SOLUTION: Performed by: NURSE PRACTITIONER

## 2017-05-22 RX ADMIN — GADOBENATE DIMEGLUMINE 12 ML: 529 INJECTION, SOLUTION INTRAVENOUS at 19:05

## 2017-05-25 ENCOUNTER — OFFICE VISIT (OUTPATIENT)
Dept: FAMILY MEDICINE CLINIC | Facility: CLINIC | Age: 22
End: 2017-05-25

## 2017-05-25 VITALS
HEIGHT: 62 IN | OXYGEN SATURATION: 98 % | BODY MASS INDEX: 28.52 KG/M2 | WEIGHT: 155 LBS | DIASTOLIC BLOOD PRESSURE: 70 MMHG | SYSTOLIC BLOOD PRESSURE: 110 MMHG | RESPIRATION RATE: 17 BRPM | HEART RATE: 98 BPM

## 2017-05-25 DIAGNOSIS — Q28.2 ARTERIOVENOUS MALFORMATION OF BRAIN: Primary | ICD-10-CM

## 2017-05-25 PROCEDURE — 99213 OFFICE O/P EST LOW 20 MIN: CPT | Performed by: FAMILY MEDICINE

## 2017-05-25 RX ORDER — DOXYCYCLINE 40 MG/1
40 CAPSULE ORAL
COMMUNITY
End: 2017-05-25

## 2017-06-02 ENCOUNTER — OFFICE VISIT (OUTPATIENT)
Dept: NEUROSURGERY | Facility: CLINIC | Age: 22
End: 2017-06-02

## 2017-06-02 VITALS
HEIGHT: 62 IN | BODY MASS INDEX: 29.26 KG/M2 | SYSTOLIC BLOOD PRESSURE: 118 MMHG | DIASTOLIC BLOOD PRESSURE: 70 MMHG | HEART RATE: 56 BPM | WEIGHT: 159 LBS

## 2017-06-02 DIAGNOSIS — Q28.2 ARTERIOVENOUS MALFORMATION OF BRAIN: Primary | ICD-10-CM

## 2017-06-02 PROCEDURE — 99213 OFFICE O/P EST LOW 20 MIN: CPT | Performed by: NEUROLOGICAL SURGERY

## 2017-06-02 NOTE — PROGRESS NOTES
"Subjective   Patient ID: Juli Luna is a 22 y.o. female who is here today for follow-up for AVM. She had an MRI of the brain at [facility] on 5/22/17. She presents accompanied by her mother.    History of Present Illness     She returns the office today for ongoing follow-up status post posterior fossa suboccipital craniotomy and removal of a brainstem AVM on September 7, 2016.  She was last seen here in the office 6 months ago with follow-up MRI imaging.  She continues to do aggressive physical therapy secondary to post hemorrhage right-sided weakness.  She has undergone repeat MRI imaging of the brain at [facility] May 22, 2017 for continued surveillance.    Today, she reports that she is continuing to do very well.  She completed therapy and reports that she only has ongoing weakness in the right hand.  She has no ongoing swallowing difficulties but does not feel that her voice is \"100%.\"  She was in school this past semester and has resumed her life without any ongoing issues or complications.  Overall, she feels that she is doing very well.    She presents with her mother.    /70 (BP Location: Right arm, Patient Position: Sitting, Cuff Size: Adult)  Pulse 56  Ht 61.5\" (156.2 cm)  Wt 159 lb (72.1 kg)  BMI 29.56 kg/m2      The following portions of the patient's history were reviewed and updated as appropriate: allergies, current medications, past family history, past medical history, past social history, past surgical history and problem list.    Review of Systems   Eyes: Negative for visual disturbance.   Musculoskeletal: Negative for neck pain.   Neurological: Negative for dizziness and headaches.       Objective   Physical Exam   Constitutional: She is oriented to person, place, and time. Vital signs are normal. She appears well-developed and well-nourished. She is cooperative.  Non-toxic appearance. She does not have a sickly appearance. She does not appear ill.   HENT:   Head: Normocephalic and " atraumatic.   Eyes: EOM are normal. Pupils are equal, round, and reactive to light.   Neck: Normal range of motion. Neck supple.   Well-healed midline trach site   Pulmonary/Chest: Effort normal and breath sounds normal.   Musculoskeletal: Normal range of motion. She exhibits no tenderness.   Strength equal throughout   Neurological: She is alert and oriented to person, place, and time. She has normal strength. She displays no tremor and normal reflexes. No cranial nerve deficit or sensory deficit. Coordination and gait normal. GCS eye subscore is 4. GCS verbal subscore is 5. GCS motor subscore is 6.   Reflex Scores:       Tricep reflexes are 2+ on the right side and 2+ on the left side.       Bicep reflexes are 2+ on the right side and 2+ on the left side.       Brachioradialis reflexes are 2+ on the right side and 2+ on the left side.       Patellar reflexes are 3+ on the right side and 3+ on the left side.       Achilles reflexes are 2+ on the right side and 2+ on the left side.  Normal gait and station, able to heel and toe walk, able to tandem  Cranial nerves II through XII grossly intact  Normal motor and sensory examination throughout     Skin: Skin is warm and dry.   Psychiatric: She has a normal mood and affect. Her behavior is normal. Judgment and thought content normal.   Vitals reviewed.    Neurologic Exam     Mental Status   Oriented to person, place, and time.     Cranial Nerves     CN III, IV, VI   Pupils are equal, round, and reactive to light.  Extraocular motions are normal.     Motor Exam     Strength   Strength 5/5 throughout.     Gait, Coordination, and Reflexes     Reflexes   Right brachioradialis: 2+  Left brachioradialis: 2+  Right biceps: 2+  Left biceps: 2+  Right triceps: 2+  Left triceps: 2+  Right patellar: 3+  Left patellar: 3+  Right achilles: 2+  Left achilles: 2+      Assessment/Plan   Independent Review of Radiographic Studies:    I personally reviewed the recent MRI of brain: This  demonstrates post hemorrhage changes in the brainstem as expected.  Previous areas of enhancement have diminished that is consistent with postoperative change.  There is no evidence of residual cavernous malformation of brain.  Medical Decision Making:    I confirmed and obtained the above history as recorded by the nurse practitioner acting as a scribe. I performed the above examination and it is documented by the nurse practitioner acting as a scribe.    This patient has had a remarkable improvement following her hemorrhagic event in her brainstem.  Frankly I feel proud to have been able to participate in some small way to help her to resume a nearly completely normal life.    She has had ongoing continuing improvements in her overall neurologic function since her event.  She is now approximately 9 months following her presentation September 7 of 2016.    I explained that I expect continuing improvement over the next 2-3 years.  The conventional wisdom is that improvement is ongoing for about a year following an event such as this but the location of her neurologic insult, in the deep portions of her brain that are evolutionarily early and ancient and therefore subject to continuing improvement far longer than the neocortex, which is, of course the most recent acquisition of humanity.      Juli was seen today for avm.    Diagnoses and all orders for this visit:    Arteriovenous malformation of brain  -     Cancel: MRI Brain With & Without Contrast; Future  -     MRI Brain With & Without Contrast; Future      Return in about 15 months (around 9/10/2018).

## 2017-08-18 ENCOUNTER — TELEPHONE (OUTPATIENT)
Dept: NEUROSURGERY | Facility: CLINIC | Age: 22
End: 2017-08-18

## 2017-08-18 NOTE — TELEPHONE ENCOUNTER
Spoke with pt mother, Marissa. Pt is not having any neck or arm pain, just the BUE numbness/tingling.  Numbness started 7/16 in RUE only which pt associated to increased writing due to being back at school.  On 8/17 pt began having numbness/tingling in LUE.  Numbness is constant, not relieved by any positional changes but is made slightly better by Tylenol.  Pt has been carrying a lot of books lately, unsure if that could be the cause.      Relayed information from LB to mother and informed mother that I would relay the info to LB and then call mother back with final word.

## 2017-08-18 NOTE — TELEPHONE ENCOUNTER
Pt last saw [place] 6/2 for a f/u AVM with MRI brain 5/22 s/p posterior fossa suboccipital craniotomy and removal of a brainstem AVM 9/7/16.  Recommended RTO 15 months.      Pt mother states that pt is having BUE numbness, and this was a symptom w/ first AVM. No other symptoms.

## 2017-08-18 NOTE — TELEPHONE ENCOUNTER
Please call back and ask if she is having any associated neck or arm pain? Also, how long have symptoms been presents?  Are the symptoms constant, or do they come and go depending on position changes, etc.?    When she last saw Dr Regalado, there were no new or residual issues or findings on her brain MRI.  Specifically, there was no evidence of residual cavernous malformation of the brain.  It is very unlikely that something would've developed between now and then in her brain to be causing the symptoms in her arms.

## 2017-08-18 NOTE — TELEPHONE ENCOUNTER
Please let her mom know I spoke with Dr. Regalado and we reviewed her most recent MRI again.  Potentially, the numbness in her arms with intermittent tingling may be related to the fact that she is back at school and carrying a heavy backpack.  We would recommend that she get a rolling backpack instead that she does not have to carry.  If her symptoms persist over the next couple weeks and/or worsen, have them call our office back.  As of right now, there is no need for additional imaging.

## 2017-08-18 NOTE — TELEPHONE ENCOUNTER
Patient last seen on 6/2/17 by Dr Regalado for avm.  Pt is not scheduled to come back for 15 months. Pt's mother Marissa called to say that patient is having numbness in both arms.  That is one of the symptoms that the pt was having when they found the avm the first time.  Pt is not having any other symptoms.   Marissa would like to know what to do please. Marissa  [phone]

## 2017-08-18 NOTE — TELEPHONE ENCOUNTER
Spoke with pt mother, given above info. Pt mother v/u states that her daughter's speech has also been slightly worsening, wonders if she needs to go back to speech therapy since she still has visits left. Dr. Ramirez is who ordered speech therapy, mother will call his office to determine if this is appropriate.

## 2017-08-21 ENCOUNTER — TELEPHONE (OUTPATIENT)
Dept: FAMILY MEDICINE CLINIC | Facility: CLINIC | Age: 22
End: 2017-08-21

## 2017-09-29 ENCOUNTER — OFFICE VISIT (OUTPATIENT)
Dept: FAMILY MEDICINE CLINIC | Facility: CLINIC | Age: 22
End: 2017-09-29

## 2017-09-29 VITALS
SYSTOLIC BLOOD PRESSURE: 120 MMHG | OXYGEN SATURATION: 98 % | DIASTOLIC BLOOD PRESSURE: 74 MMHG | HEART RATE: 74 BPM | HEIGHT: 62 IN | WEIGHT: 162 LBS | RESPIRATION RATE: 17 BRPM | BODY MASS INDEX: 29.81 KG/M2

## 2017-09-29 DIAGNOSIS — R13.10 DYSPHAGIA, UNSPECIFIED TYPE: ICD-10-CM

## 2017-09-29 DIAGNOSIS — J45.991 COUGH VARIANT ASTHMA: Primary | ICD-10-CM

## 2017-09-29 PROCEDURE — 99213 OFFICE O/P EST LOW 20 MIN: CPT | Performed by: FAMILY MEDICINE

## 2017-09-29 RX ORDER — ALBUTEROL SULFATE 90 UG/1
AEROSOL, METERED RESPIRATORY (INHALATION)
Qty: 1 INHALER | Refills: 11 | Status: SHIPPED | OUTPATIENT
Start: 2017-09-29 | End: 2021-01-11

## 2017-09-29 NOTE — PROGRESS NOTES
"Subjective   Juli Luna is a 22 y.o. female.     History of Present Illness Problems swallowing since she had the AVM. Coughs when she swallows a few x a week. Can also start coughing just sitting at work. Not gettinig worse. No cough with exercise. 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 She knows she is highly allergic to smokes. Cough is not worse lately. When she does cough it is for 5 min.     If drinks liquid fast and then water, it comes out of her nose if bends over.  The following portions of the patient's history were reviewed and updated as appropriate: allergies, current medications, past social history and problem list.    Review of Systems   Constitutional: Negative for activity change, appetite change and unexpected weight change.   HENT: Negative for nosebleeds and trouble swallowing.    Eyes: Negative for pain and visual disturbance.   Respiratory: Positive for cough. Negative for chest tightness, shortness of breath and wheezing.    Cardiovascular: Negative for chest pain and palpitations.   Gastrointestinal: Negative for abdominal pain and blood in stool.   Endocrine: Negative.    Genitourinary: Negative for difficulty urinating and hematuria.   Musculoskeletal: Negative for joint swelling.   Skin: Negative for color change and rash.   Allergic/Immunologic: Negative.    Neurological: Negative for syncope and speech difficulty.   Hematological: Negative for adenopathy.   Psychiatric/Behavioral: Negative for agitation and confusion.   All other systems reviewed and are negative.      Objective   /74  Pulse 74  Resp 17  Ht 61.5\" (156.2 cm)  Wt 162 lb (73.5 kg)  SpO2 98%  BMI 30.11 kg/m2  Physical Exam   Constitutional: She is oriented to person, place, and time. Vital signs are normal. She appears well-developed and well-nourished. No distress.   HENT:   Head: Normocephalic.   Neck: Carotid bruit is not present. No tracheal deviation present. No thyromegaly " present.   No bruits   Cardiovascular: Normal rate, regular rhythm and normal heart sounds.    Pulmonary/Chest: Effort normal and breath sounds normal. No respiratory distress. She has no wheezes. She has no rales.   Neurological: She is alert and oriented to person, place, and time. Gait normal.   Psychiatric: She has a normal mood and affect. Her behavior is normal. Judgment and thought content normal.   Vitals reviewed.      Assessment/Plan   Problem List Items Addressed This Visit        Digestive    Dysphagia      Other Visit Diagnoses     Cough variant asthma    -  Primary    Relevant Medications    albuterol (PROVENTIL HFA;VENTOLIN HFA) 108 (90 Base) MCG/ACT inhaler           Phone in next week re inhaler helped. If no help will not add the dx of cough denita asthma in the problem list. She reacts to odors, specifically cig smoke. Of course she tries to avoid smoke. There could be other allergic triggers as well . Consider allergy referral.    The swallowing difficulties are from her CVA however.  EAt food and drink slowly always.

## 2017-11-07 ENCOUNTER — OFFICE VISIT (OUTPATIENT)
Dept: FAMILY MEDICINE CLINIC | Facility: CLINIC | Age: 22
End: 2017-11-07

## 2017-11-07 VITALS
WEIGHT: 167 LBS | BODY MASS INDEX: 31.53 KG/M2 | DIASTOLIC BLOOD PRESSURE: 70 MMHG | HEART RATE: 87 BPM | HEIGHT: 61 IN | RESPIRATION RATE: 16 BRPM | OXYGEN SATURATION: 97 % | SYSTOLIC BLOOD PRESSURE: 100 MMHG | TEMPERATURE: 98.4 F

## 2017-11-07 DIAGNOSIS — S66.911A STRAIN OF RIGHT HAND, INITIAL ENCOUNTER: ICD-10-CM

## 2017-11-07 DIAGNOSIS — J06.9 ACUTE URI: Primary | ICD-10-CM

## 2017-11-07 PROCEDURE — 99213 OFFICE O/P EST LOW 20 MIN: CPT | Performed by: FAMILY MEDICINE

## 2017-11-07 NOTE — PROGRESS NOTES
"Subjective   Juli Luna is a 22 y.o. female.     History of Present Illness Began coughing 6 days ago. Sore throat. Sneezing and blowing nose. No facial pain.Nonprod. No HUANG. Can sleep at night. No wheeze.    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 When she left the work her R  hand would be in pain with totally numb thumb.that total numbness lasted 2 days after she quit the job. Quit job 2 weeks ago, and is \"a lot better\"but continues to hurt at night, and thumb is numb only intermittently.    The following portions of the patient's history were reviewed and updated as appropriate: allergies, current medications, past social history and problem list.    Review of Systems   Constitutional: Positive for fatigue. Negative for activity change and unexpected weight change.   HENT: Positive for congestion, postnasal drip and sore throat. Negative for ear pain.    Eyes: Negative for pain and discharge.   Respiratory: Positive for cough. Negative for chest tightness, shortness of breath and wheezing.    Cardiovascular: Negative for chest pain and palpitations.   Gastrointestinal: Negative for abdominal pain, diarrhea and vomiting.   Endocrine: Negative.    Genitourinary: Negative.    Musculoskeletal: Positive for arthralgias and myalgias. Negative for joint swelling.   Skin: Negative for color change, rash and wound.   Allergic/Immunologic: Negative.    Neurological: Positive for numbness. Negative for seizures and syncope.   Psychiatric/Behavioral: Negative.        Objective   /70  Pulse 87  Temp 98.4 °F (36.9 °C)  Resp 16  Ht 61\" (154.9 cm)  Wt 167 lb (75.8 kg)  SpO2 97%  BMI 31.55 kg/m2  Physical Exam   Constitutional: She appears well-developed and well-nourished.   HENT:   Head: Normocephalic and atraumatic.   Right Ear: Tympanic membrane, external ear and ear canal normal.   Left Ear: Tympanic membrane, " external ear and ear canal normal.   Mouth/Throat: Oropharynx is clear and moist.   Eyes: Conjunctivae are normal. Right eye exhibits no discharge. Left eye exhibits no discharge.   Neck: Normal range of motion. Neck supple. No thyromegaly present.   Cardiovascular: Normal rate, regular rhythm and normal heart sounds.    Pulmonary/Chest: Effort normal and breath sounds normal. No respiratory distress. She has no wheezes. She has no rales.   Musculoskeletal: Tenderness: R hand completely nontender, as is wrist. No pain with felxion or extension of any digit including the first. No edema or eryth.   Lymphadenopathy:     She has no cervical adenopathy.   Skin: Skin is warm and dry.   Psychiatric: She has a normal mood and affect. Judgment normal.   Vitals reviewed.      Assessment/Plan   Problem List Items Addressed This Visit     None      Visit Diagnoses     Acute URI    -  Primary    Strain of right hand, initial encounter               Loose wrist splint at night  2 aleve bid with food. Ice or cold to the hand augie prn pain in the evening or at night.  Give the and pain another month to fully recover. If not, will refer to hand surgery, but doubt that will be nec.    Antibiotics prn increased secretions.

## 2017-11-08 ENCOUNTER — OFFICE VISIT (OUTPATIENT)
Dept: GASTROENTEROLOGY | Facility: CLINIC | Age: 22
End: 2017-11-08

## 2017-11-08 VITALS
HEIGHT: 62 IN | WEIGHT: 169 LBS | BODY MASS INDEX: 31.1 KG/M2 | DIASTOLIC BLOOD PRESSURE: 70 MMHG | SYSTOLIC BLOOD PRESSURE: 100 MMHG

## 2017-11-08 DIAGNOSIS — K21.9 GASTROESOPHAGEAL REFLUX DISEASE, ESOPHAGITIS PRESENCE NOT SPECIFIED: Primary | ICD-10-CM

## 2017-11-08 PROCEDURE — 99213 OFFICE O/P EST LOW 20 MIN: CPT | Performed by: NURSE PRACTITIONER

## 2017-11-08 RX ORDER — PANTOPRAZOLE SODIUM 40 MG/1
40 TABLET, DELAYED RELEASE ORAL DAILY
Qty: 30 TABLET | Refills: 4 | Status: SHIPPED | OUTPATIENT
Start: 2017-11-08 | End: 2019-04-12

## 2017-11-08 NOTE — PROGRESS NOTES
Chief Complaint   Patient presents with   • Vomiting     hours after eatting when bending approx. 2-3 months     HPI    Pleasant 22-year-old female patient of Dr. Andrade's last evaluated in the office in February of this year when she was seen for removal of a PEG tube.  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  Patient had resultant dysphagia and paralysis requiring placement of the PEG tube.  She has had remarkable improvement after extensive rehabilitation working with PT, OT as well as speech therapy.    She presents today stating that approximately 2-3 months ago she started noticing intermittent episodes of hot sour acid and water brash in the back of her throat when she would bend over.  This does not occur on a routine basis and has not occurred during the night.  Again it is only when she bends over.  There are not other precipitating factors. She does not have persistent nausea or vomiting she has had no complaints of dysphagia or odynophagia.  Her weight is stable.  She has taken no medications over-the-counter for symptoms.  She denies any excessive belching or heartburn symptoms.  She has had no epigastric discomfort associated with this.    Past Medical History:   Diagnosis Date   • AVM (arteriovenous malformation)    • History of pneumonia      Past Surgical History:   Procedure Laterality Date   • CRANIOTOMY FOR TUMOR Left 9/7/2016    Procedure: Posterior fossa suboccipital craniotomy and removal of brain stem arteriovenous malformation;  Surgeon: Josias Regalado MD;  Location: [facility] OR;  Service:    • ENDOSCOPY W/ PEG TUBE PLACEMENT N/A 9/3/2016    Procedure: ESOPHAGOGASTRODUODENOSCOPY WITH PERCUTANEOUS ENDOSCOPIC GASTROSTOMY TUBE INSERTION;  Surgeon: René Ritter MD;  Location: [facility] ENDOSCOPY;  Service:    • TRACHEOSTOMY N/A 9/8/2016    Procedure: TRACHEOSTOMY;  Surgeon: Chad Negron MD;  Location:  "[facility] MAIN OR;  Service:        Current Outpatient Prescriptions   Medication Sig Dispense Refill   • albuterol (PROVENTIL HFA;VENTOLIN HFA) 108 (90 Base) MCG/ACT inhaler 2 puffs a min apart every 4 hr as needed. 1 inhaler 11   • spironolactone (ALDACTONE) 50 MG tablet 1 tablet by Per G Tube route 2 (Two) Times a Day.     • pantoprazole (PROTONIX) 40 MG EC tablet Take 1 tablet by mouth Daily. 30 tablet 4     No current facility-administered medications for this visit.        PRN Meds:.    No Known Allergies    Social History     Social History   • Marital status: Single     Spouse name: N/A   • Number of children: N/A   • Years of education: N/A     Occupational History   • part-time retail/student      Social History Main Topics   • Smoking status: Never Smoker   • Smokeless tobacco: Never Used   • Alcohol use Yes      Comment: occ or socially   • Drug use: No   • Sexual activity: Not on file     Other Topics Concern   • Not on file     Social History Narrative       Family History   Problem Relation Age of Onset   • Diabetes Mother    • Hypertension Mother    • Heart disease Maternal Uncle    • Heart disease Maternal Grandmother    • Hypertension Maternal Grandmother    • Heart disease Maternal Grandfather    • Diabetes Maternal Grandfather    • Diabetes Other    • Hypertension Other        Review of Systems   Constitutional: Negative for activity change, appetite change and unexpected weight change.   HENT: Negative for sore throat, trouble swallowing and voice change.         Lots of allergy and sinus issues including post nasal drainage   Respiratory: Negative for cough and choking.    Gastrointestinal: Negative for abdominal distention, abdominal pain, nausea and vomiting.       Vitals:    11/08/17 1422   BP: 100/70     /70  Ht 61.5\" (156.2 cm)  Wt 169 lb (76.7 kg)  BMI 31.42 kg/m2  Physical Exam   Constitutional: She appears well-developed and well-nourished. No distress.   HENT:   Head: " Normocephalic and atraumatic.   Neck:   Well healed trach scar   Cardiovascular: Normal rate and regular rhythm.    Pulmonary/Chest: Effort normal and breath sounds normal.   Abdominal: Soft. Bowel sounds are normal. She exhibits no distension. There is no tenderness.   Skin: Skin is warm and dry.   Psychiatric: She has a normal mood and affect.   Vitals reviewed.      ASSESSMENT AND PLAN    Juli was seen today for vomiting.    Diagnoses and all orders for this visit:    Gastroesophageal reflux disease, esophagitis presence not specified  Comments:  trial of daily PPI    Other orders  -     pantoprazole (PROTONIX) 40 MG EC tablet; Take 1 tablet by mouth Daily.    Start daily PPI.  Instructed to avoid eating large meals and then exercising or bending over, avoid eating at least 2 hours before lying down at night.  Patient will call if symptoms are not improved or certainly if they worsen for further evaluation.  She has no red flag symptoms such as dysphagia or odynophagia to warrant an EGD urgently.  When her PEG was placed in September 2017 she had findings of a normal esophagus, no evidence of gastritis or duodenitis.         DEANA Roman   [facility]  [address] [place] [address] [address] [place] [address]  Office: [phone]

## 2017-11-10 ENCOUNTER — TELEPHONE (OUTPATIENT)
Dept: GASTROENTEROLOGY | Facility: CLINIC | Age: 22
End: 2017-11-10

## 2017-11-10 NOTE — TELEPHONE ENCOUNTER
Called pt's mother who advised that there daughter got the internet and read about pantoprazole and is hesitant to take med.  Advised the mother that this is a medication that her daughter should only have to take for a short period of time.  She can try taking the med to see if this helps her symptoms and if not she can let us know.  Pt's mother verb understanding and believes her daughter thought she would have to be on this long term.  She states she will talk with her daughter and if she does not want to take the pantoprazole, they will call us back.    Update sent to A Alejo COLE

## 2017-11-10 NOTE — TELEPHONE ENCOUNTER
----- Message from Sohan Lopez sent at 11/10/2017 11:54 AM EST -----  Regarding: PT MOTHER NICK CALLED  Contact: [phone]  PT MOTHER IS CALLING STATING HER DAUGHTER WAS GIVING SOME MEDICATION pantoprazole (PROTONIX) 40 MG EC tablet, THE DAUGHTER LOOKED UP THE MEDS AND SHE DON'T WANNA TAKE THAT, SHE WOULD LIKE TO SEE ABOUT ANOTHER MEDICATION ?

## 2018-04-03 ENCOUNTER — TELEPHONE (OUTPATIENT)
Dept: NEUROSURGERY | Facility: CLINIC | Age: 23
End: 2018-04-03

## 2018-04-03 NOTE — TELEPHONE ENCOUNTER
I spoke to mom. Numbness is not a constant symptom - comes and goes She had contact with Dr. Ramirez a few months ago. He recommended cock-up wrist splints (suspected CTS). Patient has more recently received Rx for diclofenac from PCP for this same type pain. He has also recommended PT.    No imaging of cervical spine to date (xrays first? - if positive finding, perhaps MRI). This might be something they could discuss w/PCP.

## 2018-04-03 NOTE — TELEPHONE ENCOUNTER
Patient last seen on 6/2/17 by Dr Regalado for avm. Post posterior fossa suboccipital craniotomy and removal of a brainstem AVM on September 7, 2016.    Patient is due for a New MRI Brain and follow-up this September.. Pt's mother Marissa called to advise that both arms and hands of the patient go to sleep to the point where they hurt, she is also having right sided soreness on her low back area.      We had a similar phone conversation noted back on 8/18/17. Marissa would like a call back at [phone].

## 2018-04-10 ENCOUNTER — HOSPITAL ENCOUNTER (OUTPATIENT)
Dept: GENERAL RADIOLOGY | Facility: HOSPITAL | Age: 23
Discharge: HOME | End: 2018-04-10
Admitting: FAMILY MEDICINE

## 2018-04-10 DIAGNOSIS — R52 PAIN: ICD-10-CM

## 2018-04-10 PROCEDURE — 72110 X-RAY EXAM L-2 SPINE 4/>VWS: CPT

## 2018-04-23 ENCOUNTER — TRANSCRIBE ORDERS (OUTPATIENT)
Dept: PHYSICAL THERAPY | Facility: HOSPITAL | Age: 23
End: 2018-04-23

## 2018-04-23 DIAGNOSIS — M54.9 BACK PAIN, UNSPECIFIED BACK LOCATION, UNSPECIFIED BACK PAIN LATERALITY, UNSPECIFIED CHRONICITY: Primary | ICD-10-CM

## 2018-04-26 ENCOUNTER — HOSPITAL ENCOUNTER (OUTPATIENT)
Dept: PHYSICAL THERAPY | Facility: HOSPITAL | Age: 23
Setting detail: THERAPIES SERIES
Discharge: HOME | End: 2018-04-26

## 2018-04-26 DIAGNOSIS — G56.03 BILATERAL CARPAL TUNNEL SYNDROME: ICD-10-CM

## 2018-04-26 DIAGNOSIS — Z86.79 HISTORY OF INTRACRANIAL HEMORRHAGE: ICD-10-CM

## 2018-04-26 DIAGNOSIS — R29.3 BAD POSTURE: Primary | ICD-10-CM

## 2018-04-26 DIAGNOSIS — R53.1 WEAKNESS GENERALIZED: ICD-10-CM

## 2018-04-26 DIAGNOSIS — M54.50 ACUTE RIGHT-SIDED LOW BACK PAIN WITHOUT SCIATICA: ICD-10-CM

## 2018-04-26 DIAGNOSIS — R29.898 WEAKNESS OF BACK: ICD-10-CM

## 2018-04-26 PROCEDURE — 97110 THERAPEUTIC EXERCISES: CPT | Performed by: PHYSICAL THERAPIST

## 2018-04-26 PROCEDURE — 97161 PT EVAL LOW COMPLEX 20 MIN: CPT | Performed by: PHYSICAL THERAPIST

## 2018-04-26 NOTE — THERAPY EVALUATION
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   • H/O craniotomy   • Episode of syncope   • Iron deficiency anemia due to chronic blood loss   • Dysphagia        Past Medical History:   Diagnosis Date   • AVM (arteriovenous malformation)    • History of pneumonia         Past Surgical History:   Procedure Laterality Date   • CRANIOTOMY FOR TUMOR Left 2016    Procedure: Posterior fossa suboccipital craniotomy and removal of brain stem arteriovenous malformation;  Surgeon: Josias Regalado MD;  Location: [facility];  Service:    • ENDOSCOPY W/ PEG TUBE PLACEMENT N/A 9/3/2016    Procedure: ESOPHAGOGASTRODUODENOSCOPY WITH PERCUTANEOUS ENDOSCOPIC GASTROSTOMY TUBE INSERTION;  Surgeon: René Ritter MD;  Location: [facility] ENDOSCOPY;  Service:    • TRACHEOSTOMY N/A 2016    Procedure: TRACHEOSTOMY;  Surgeon: Chad Negron MD;  Location: [facility] OR;  Service:        Visit Dx:     ICD-10-CM ICD-9-CM   1. Bad posture R29.3 781.92   2. History of intracranial hemorrhage Z86.79 V12.59   3. Bilateral carpal tunnel syndrome G56.03 354.0   4. Acute right-sided low back pain without sciatica M54.5 724.2   5. Weakness generalized R53.1 780.79   6. Weakness of back R29.898 724.8             Patient History     Row Name 18 1030             History    Chief Complaint Pain;Tinglings;Balance Problems  -SC      Type of Pain Back pain;Upper Extremity / Arm;Hand pain   right low back, bilateral hands/forearms  -SC      Date Current Problem(s) Began 16  -SC      Brief Description of Current Complaint Patient with history of intramedullary hemorrhage, craniotomy 16  posterior fossa suboccipital with removal of brain stem arteriovenous malformation. Patient had PEG and trach. She required long term acute care and outpatient rehab. She has not done outpatient physical therapy but did have extensive inpatient and rehab with OT, PT and SpT. 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 Goal to transition to [employer] to major in media/journalism. Patient states since surgery does have issue with balance, no falls and bilateral R > L wrist/hand pain, mostly noted first thing in AM and writing. Patient is right handed. She does wear a night splint to sleep on right and does wear it with increased writing. Patient's neurosurgeon is Dr. Bryant. Patient is due for next brain MRI in Sept 2018. Has not had an EMG but was diagnosed with bilateral CTS, no injections. She reports no cervical or head pain. Patient does report that right low back acute pain began mid March insidious but possibly from working a double shift with prolonged sitting. Her goal is to decrease back pain, improve balance, and decrease bilateral CTS pain. She does exercise regularly at the [facility] light cardio and gentle weight training on machines.   -SC      Patient/Caregiver Goals Relieve pain;Improve mobility;Improve strength;Know what to do to help the symptoms;Other (comment)   help balance/posture  -SC      Current Tobacco Use none  -SC      Smoking Status none  -SC      Patient's Rating of General Health Good  -SC      Hand Dominance right-handed  -SC      Occupation/sports/leisure activities school, gym, work  -SC      Patient seeing anyone else for problem(s)? yes   PCP Jung and neurosurgeon Dr. Bryant  -SC      How has patient tried to help current problem? night splint, gentle stretching, exercise at [facility], muscle relaxers, heat  -SC      What clinical tests have you had for this problem? X-ray   lumbar  -SC      Results of Clinical Tests negative  -SC      Related/Recent Hospitalizations No   -SC      History of Previous Related Injuries craniotomy  -SC      Are you or can you be pregnant No  -SC         Pain     Pain Location Back;Hand  -SC      Pain at Present 0  -SC      Pain at Best 0  -SC      Pain at Worst 2  -SC      Pain Frequency Intermittent  -SC      Pain Description Aching;Discomfort;Pins and needles;Numbness;Radiating  -SC      What Performance Factors Make the Current Problem(s) WORSE? hands: first thing AM, increased writing LBP prolonged sitting  -SC      What Performance Factors Make the Current Problem(s) BETTER? hands: splint, gentle movement of hands LBP: mm relaxers, change positions  -SC         Fall Risk Assessment    Any falls in the past year: No  -SC      Previous Functional Level --   independent  -SC         Services    Are you currently receiving Home Health services No  -SC         Daily Activities    Primary Language English  -SC      Are you able to read Yes  -SC      Are you able to write Yes  -SC      How does patient learn best? Listening  -SC      Teaching needs identified Home Exercise Program;Management of Condition  -SC      Patient is concerned about/has problems with Performing job responsibilities/community activities (work, school,;Performing sports, recreation, and play activities;Repetitive movements of the hand, arm, shoulder;Sitting;Writing/grasping items with hand(s)  -SC      Does patient have problems with the following? None  -SC      Barriers to learning None  -SC      Explanation of Functional Status Problem independent  -SC      Pt Participated in POC and Goals Yes  -SC         Safety    Are you being hurt, hit, or frightened by anyone at home or in your life? No  -SC      Are you being neglected by a caregiver No  -SC        User Key  (r) = Recorded By, (t) = Taken By, (c) = Cosigned By    Initials Name Provider Type    SC Jodee Babcock PT Physical Therapist                PT Ortho     Row Name 04/26/18 1030       Posture/Observations     Alignment Options Forward head;Cervical lordosis;Thoracic kyphosis;Rounded shoulders;Scapular elevation;Scapular winging;Scoliosis;Lumbar lordosis  -SC    Forward Head Mild  -SC    Cervical Lordosis Mild  -SC    Thoracic Kyphosis Mild  -SC    Rounded Shoulders Moderate  -SC    Scapular Elevation Right:;Mild  -SC    Scapular winging Normal  -SC    Scoliosis Left:;Mild;Moderate   increases with lumbar flexion  -SC    Lumbar lordosis Moderate  -SC    Posture/Observations Comments poor posturing syndrome  -SC       Quarter Clearing    Quarter Clearing Upper Quarter Clearing;Lower Quarter Clearing  -SC       DTR- Upper Quarter Clearing    Biceps (C5/6) Bilateral:;2- Normal response  -SC       Neural Tension Signs- Upper Quarter Clearing    ULNTT 1 Right:;Left:;Negative  -SC    ULNTT 2 Right:;Left:;Negative  -SC    ULNTT 3 Right:;Left:;Negative   tightness noted left however no increased pain or symptoms  -SC    ULNTT 4 Right:;Left:;Negative  -SC       Sensory Screen for Light Touch- Upper Quarter Clearing    C4 (posterior shoulder) Bilateral:;Intact  -SC    C5 (lateral upper arm) Bilateral:;Intact  -SC    C6 (tip of thumb) Bilateral:;Intact  -SC    C7 (tip of 3rd finger) Bilateral:;Intact  -SC    C8 (tip of 5th finger) Bilateral:;Intact  -SC    T1 (medial lower arm) Bilateral:;Intact  -SC       Myotomal Screen- Upper Quarter Clearing    Shoulder flexion (C5) Bilateral:;5 (Normal)  -SC    Elbow flexion/wrist extension (C6) Bilateral:;5 (Normal)  -SC     --   R 25# L 41#  -SC       Cervical/Shoulder ROM Screen    Cervical flexion Normal  -SC    Cervical extension Normal  -SC    Cervical lateral flexion Normal  -SC    Cervical rotation Normal  -SC    Shoulder elevation  Normal  -SC       DTR- Lower Quarter Clearing    Patellar tendon (L2-4) Right:;3- Slightly hyperactive response;Left:;2- Normal response  -SC    Achilles tendon (S1-2) Right:;3- Slightly hyperactive response;Left:;2- Normal response  -SC       Neural  Tension Signs- Lower Quarter Clearing    Slump Bilateral:;Negative  -SC    SLR Bilateral:;Negative  -SC       Sensory Screen for Light Touch- Lower Quarter Clearing    L1 (inguinal area) Bilateral:;Intact  -SC    L2 (anterior mid thigh) Bilateral:;Intact  -SC    L3 (distal anterior thigh) Bilateral:;Intact  -SC    L4 (medial lower leg/foot) Bilateral:;Intact  -SC    L5 (lateral lower leg/great toe) Bilateral:;Intact  -SC       Myotomal Screen- Lower Quarter Clearing    Hip flexion (L2) Right:;4 (Good);Left:;4- (Good -)  -SC    Knee extension (L3) Bilateral:;5 (Normal)  -SC    Ankle DF (L4) Bilateral:;5 (Normal)  -SC    Knee flexion (S2) Bilateral:;5 (Normal)  -SC       Lumbar ROM Screen- Lower Quarter Clearing    Lumbar Flexion Normal   with noted increased scoliotic curvature to left with moveme  -SC    Lumbar Extension Normal  -SC    Lumbar Lateral Flexion Normal  -SC    Lumbar Rotation Normal  -SC       SI/Hip Screen- Lower Quarter Clearing    Gaenslen's test Bilateral:;Negative  -SC    ASIS compression Bilateral:;Negative  -SC    ASIS distraction Bilateral:;Negative  -SC    Adriano's/Tam's test Bilateral:;Negative  -SC    Posterior thigh sheer Bilateral:;Negative  -SC    Pain in Annika's area Bilateral:;Negative  -SC       Special Tests/Palpation    Special Tests/Palpation Wrist/Hand;Lumbar/SI  -SC       Lumbosacral Palpation    Lumbosacral Palpation? --   negative tenderness to palpation grossly lumbar  -SC       Lumbar/SI Special Tests    Trendelenburg Test (Gluteus Medius Weakness) Left:;Positive  -SC    Tiffany Jose Test (HNP) Negative  -SC    Long Sit Test (Pelvic Malalignment) Negative  -SC       Hand/Wrist Palpation- Ulnar Aspect    Hand/Wrist Palpation- Ulnar Aspect? --   negative tenderness to palp B hands/wrist  -SC       Wrist/Hand Special Tests    Finklestein test (DeQuervain’s syndrome) Bilateral:;Negative  -SC    Phalen’s test (carpal tunnel syndrome) Bilateral:;Negative  -SC    Reverse Phalen’s  (carpal tunnel syndrome) Bilateral:;Negative  -SC    Tinel’s sign (median nerve irritation) Bilateral:;Negative  -SC      User Key  (r) = Recorded By, (t) = Taken By, (c) = Cosigned By    Initials Name Provider Type    SC Jodee Babcock, PT Physical Therapist                      Therapy Education  Education Details: CTS, use of splint bilateral, progression of core stabilization and balance/proprioception exercises, instructed to hold weight training at gym 2 weeks (ok light cardio) to focus on posture and proper performance of exercises in clinic  Given: HEP, Symptoms/condition management, Pain management, Posture/body mechanics, Mobility training, Other (comment)  Program: New  How Provided: Verbal, Demonstration, Written  Provided to: Patient  Level of Understanding: Teach back education performed, Verbalized, Demonstrated           PT OP Goals     Row Name 04/26/18 1030          PT Short Term Goals    STG Date to Achieve 05/24/18  -SC     STG 1 Patient indepencent and compliant with intial HEP focused on core stabilization and flexibility/tendon glides  -SC     STG 2 Patient able to perform TA/PPT with proper form in HL and seated/standing positions to improve posture and form with gym exercise program  -SC     STG 3 Patient report resolution of lumbar symptoms with improved ergonomic set up to allow for decreased risk of reinjury  -[facility] 4 Patient compliant with bilateral night resting splints to improve sleep and decreased pain in AM  -SC        Long Term Goals    LTG Date to Achieve 06/21/18  -SC     LTG 1 Patient independent and compliant with advanced HEP and return to gym exercise program  -SC     LTG 2 Patient score less or equal 10% on Oswestry LBP questionnaire for improved function  -SC     LTG 3 Patient with noted improved posture in standing and seated positions without verbal cues for decreased risk of reinjury and improved form with gym exercise program  -SC     LTG 4 Patient MMT  bilateral hip flexors (SLR) in supine position 5/5 without compensation patterns for improved core stabilization thus improved balance/coordination  -SC        Time Calculation    PT Goal Re-Cert Due Date 06/21/18  -SC       User Key  (r) = Recorded By, (t) = Taken By, (c) = Cosigned By    Initials Name Provider Type    SC Jodee Babcock, PT Physical Therapist                PT Assessment/Plan     Row Name 04/26/18 1030          PT Assessment    Functional Limitations Decreased safety during functional activities;Limitation in home management;Limitations in community activities;Limitations in functional capacity and performance;Performance in leisure activities;Performance in sport activities;Performance in work activities  -SC     Impairments Balance;Dexterity;Muscle strength;Pain;Peripheral nerve integrity;Poor body mechanics;Posture  -SC     Assessment Comments Patient is a 23 year old female, history of intracranial hemorrhage in 2016, s/p craniotomy 09/07/16 with PEG and trach, required extensive OT, PT and SpT rehab. She has returns to near prior level of functioning with residual bilateral hand/forearm pain, s/s consistent with CTS. She currently wears night resting splint on right. She also reports insidious acute low back pain right sided beginning mid March 2018, x-rays negative, taking mm relaxers with marked reduction of pain currently. She is a student, works, and regularly exercises at the [facility]. She does present with marked poor posturing, weakness right , and muscle imbalance with poor core and pelvic stabilization. She will greatly benefit from formal outpatient PT to address these issues, improved core/pelvic stabilization, posture to allow for improve balance, coordination and decreased pain.   -SC     Please refer to paper survey for additional self-reported information Yes  -SC     Rehab Potential Good  -SC     Patient/caregiver participated in establishment of treatment plan and goals  Yes  -SC     Patient would benefit from skilled therapy intervention Yes  -SC        PT Plan    PT Frequency 2x/week  -SC     Predicted Duration of Therapy Intervention (OT Eval) 4-8 weeks  -SC     Planned CPT's? PT EVAL LOW COMPLEXITY: 24117;PT RE-EVAL: 47562;PT THER PROC EA 15 MIN: 09729;PT THER ACT EA 15 MIN: 54370;PT MANUAL THERAPY EA 15 MIN: 05635;PT NEUROMUSC RE-EDUCATION EA 15 MIN: 73785;PT GAIT TRAINING EA 15 MIN: 41050;PT EVAL AQUA: 13231;PT AQUATIC THERAPY EA 15 MIN: 40728;PT SELF CARE/HOME MGMT/TRAIN EA 15: 55140;PT HOT OR COLD PACK TREAT MCARE  -SC     PT Plan Comments focus on core/pelvic stabilization, balance/coordination exercises, gentle mobility/tendon glides for hands  -SC       User Key  (r) = Recorded By, (t) = Taken By, (c) = Cosigned By    Initials Name Provider Type    SC Jodee Babcock, SOFY Physical Therapist                  Exercises     Row Name 04/26/18 1030             Exercise 1    Exercise Name 1 wrist ext stretch (bent elbow to straight elbow)  -SC      Sets 1 1  -SC      Reps 1 3  -SC      Time 1 15 seconds  -SC      Additional Comments bilateral  -SC         Exercise 2    Exercise Name 2 wrist flx stretch (bent to straight elbow)  -SC      Sets 2 1  -SC      Reps 2 3  -SC      Time 2 15 seconds  -SC      Additional Comments bilateral  -SC         Exercise 3    Exercise Name 3 tendon glides L, e, a   -SC      Reps 3 10  -SC      Additional Comments bilateral  -SC        User Key  (r) = Recorded By, (t) = Taken By, (c) = Cosigned By    Initials Name Provider Type    SC Jodee Babcock, PT Physical Therapist                        Outcome Measure Options: Modifed Owestry  Modified Oswestry  Modified Oswestry Score/Comments: 12/50; 24/100; 24%      Time Calculation:   Start Time: 1030  Stop Time: 1110  Time Calculation (min): 40 min     Therapy Charges for Today     Code Description Service Date Service Provider Modifiers Qty    [id number]  PT THER PROC EA 15 MIN 4/26/2018  Jodee Babcock, PT GP 1    [id number] HC PT EVAL LOW COMPLEXITY 2 4/26/2018 Jodee Babcock, PT GP 1          PT G-Codes  Outcome Measure Options: Vickie Zavaleta, PT  4/26/2018

## 2018-05-02 ENCOUNTER — HOSPITAL ENCOUNTER (OUTPATIENT)
Dept: PHYSICAL THERAPY | Facility: HOSPITAL | Age: 23
Setting detail: THERAPIES SERIES
Discharge: HOME | End: 2018-05-02

## 2018-05-02 DIAGNOSIS — R29.3 BAD POSTURE: Primary | ICD-10-CM

## 2018-05-02 DIAGNOSIS — G56.03 BILATERAL CARPAL TUNNEL SYNDROME: ICD-10-CM

## 2018-05-02 DIAGNOSIS — R26.9 ABNORMAL GAIT: ICD-10-CM

## 2018-05-02 DIAGNOSIS — R53.1 WEAKNESS GENERALIZED: ICD-10-CM

## 2018-05-02 DIAGNOSIS — Z86.79 HISTORY OF INTRACRANIAL HEMORRHAGE: ICD-10-CM

## 2018-05-02 DIAGNOSIS — M54.50 ACUTE RIGHT-SIDED LOW BACK PAIN WITHOUT SCIATICA: ICD-10-CM

## 2018-05-02 DIAGNOSIS — R29.898 WEAKNESS OF BACK: ICD-10-CM

## 2018-05-02 DIAGNOSIS — R27.9 LACK OF COORDINATION: ICD-10-CM

## 2018-05-02 PROCEDURE — 97110 THERAPEUTIC EXERCISES: CPT | Performed by: PHYSICAL THERAPIST

## 2018-05-02 PROCEDURE — 97112 NEUROMUSCULAR REEDUCATION: CPT | Performed by: PHYSICAL THERAPIST

## 2018-05-02 NOTE — THERAPY TREATMENT NOTE
Outpatient Physical Therapy Ortho Treatment Note   [facility]     Patient Name: Juli Luna  : 1995  MRN: [id number]  Today's Date: 2018      Visit Date: 2018    Visit Dx:    ICD-10-CM ICD-9-CM   1. Bad posture R29.3 781.92   2. History of intracranial hemorrhage Z86.79 V12.59   3. Bilateral carpal tunnel syndrome G56.03 354.0   4. Acute right-sided low back pain without sciatica M54.5 724.2   5. Weakness generalized R53.1 780.79   6. Weakness of back R29.898 724.8   7. Abnormal gait R26.9 781.2   8. 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   • H/O craniotomy   • Episode of syncope   • Iron deficiency anemia due to chronic blood loss   • Dysphagia        Past Medical History:   Diagnosis Date   • AVM (arteriovenous malformation)    • History of pneumonia         Past Surgical History:   Procedure Laterality Date   • CRANIOTOMY FOR TUMOR Left 2016    Procedure: Posterior fossa suboccipital craniotomy and removal of brain stem arteriovenous malformation;  Surgeon: Josias Regalado MD;  Location: [facility];  Service:    • ENDOSCOPY W/ PEG TUBE PLACEMENT N/A 9/3/2016    Procedure: ESOPHAGOGASTRODUODENOSCOPY WITH PERCUTANEOUS ENDOSCOPIC GASTROSTOMY TUBE INSERTION;  Surgeon: René Ritter MD;  Location: [facility] ENDOSCOPY;  Service:    • TRACHEOSTOMY N/A 2016    Procedure: TRACHEOSTOMY;  Surgeon: Chad Negron MD;  Location: [facility];  Service:                              PT Assessment/Plan     Row Name 18 0900          PT Assessment    Assessment Comments Tolerated all low level exercises without increase in symptoms, will continue to advance core/pelvic stabilization as patient tolerates and education stretching to perform AM/PM for symptom management. Marked increased lumbar  lordosis in standing, Improves with verbal and tactile cues.   -SC        PT Plan    PT Plan Comments progress to more advanced core/pelvic stabilization then incorporate balance/coordination exercises as patient tolerates with goal to return to gym program independently  -SC       User Key  (r) = Recorded By, (t) = Taken By, (c) = Cosigned By    Initials Name Provider Type    SC Jodee Babcock, PT Physical Therapist                    Exercises     Row Name 05/02/18 0824             Subjective Pain    Able to rate subjective pain? yes  -SC      Pre-Treatment Pain Level 2  -SC      Subjective Pain Comment right wrist strain, no pain back, no pain left wrist  -SC         Exercise 1    Exercise Name 1 nustep  -SC      Time 1 5 minutes  -SC      Additional Comments L5 UEs 6  -SC         Exercise 2    Exercise Name 2 HR glut set  -SC      Reps 2 15  -SC      Time 2 5  -SC         Exercise 3    Exercise Name 3 hip abd standing  -SC      Reps 3 10  -SC      Additional Comments B  -SC         Exercise 4    Exercise Name 4 mini squat  -SC      Reps 4 10  -SC         Exercise 5    Exercise Name 5 shoulder ext  -SC      Sets 5 2  -SC      Reps 5 10  -SC      Additional Comments GTB B, Uni  -SC         Exercise 6    Exercise Name 6 PPT hip add iso ball squeeze bridge  -SC      Reps 6 15  -SC         Exercise 7    Exercise Name 7 hip abd ER TB HL  -SC      Sets 7 3  -SC      Reps 7 10  -SC      Additional Comments RTB R, L, B  -SC         Exercise 8    Exercise Name 8 LTR  -SC      Reps 8 5  -SC      Time 8 5  -SC      Additional Comments HL  -SC         Exercise 9    Exercise Name 9 90/90 hamstring stretch  -SC      Additional Comments next  -SC         Exercise 10    Exercise Name 10 supine piriformis stretch  -SC      Additional Comments next  -SC        User Key  (r) = Recorded By, (t) = Taken By, (c) = Cosigned By    Initials Name Provider Type    SC Jodee Babcock, PT Physical Therapist                                PT OP Goals     Row Name 05/02/18 0900          PT Short Term Goals    STG Date to Achieve 05/24/18  -SC     STG 1 Patient indepencent and compliant with intial HEP focused on core stabilization and flexibility/tendon glides  -SC     STG 1 Progress Progressing  -SC     STG 2 Patient able to perform TA/PPT with proper form in HL and seated/standing positions to improve posture and form with gym exercise program  -SC     STG 2 Progress Progressing  -SC     STG 3 Patient report resolution of lumbar symptoms with improved ergonomic set up to allow for decreased risk of reinjury  -SC     STG 3 Progress Progressing  -SC     STG 4 Patient compliant with bilateral night resting splints to improve sleep and decreased pain in AM  -SC     STG 4 Progress [facility]        Long Term Goals    LTG Date to Achieve 06/21/18  -SC     LTG 1 Patient independent and compliant with advanced HEP and return to gym exercise program  -SC     LTG 1 Progress [facility]     LTG 2 Patient score less or equal 10% on Oswestry LBP questionnaire for improved function  -SC     LTG 2 Progress [facility]     LTG 3 Patient with noted improved posture in standing and seated positions without verbal cues for decreased risk of reinjury and improved form with gym exercise program  -SC     LTG 3 Progress [facility]     LTG 4 Patient MMT bilateral hip flexors (SLR) in supine position 5/5 without compensation patterns for improved core stabilization thus improved balance/coordination  -SC     LTG 4 Progress [facility]        Time Calculation    PT Goal Re-Cert Due Date 06/21/18  -SC       User Key  (r) = Recorded By, (t) = Taken By, (c) = Cosigned By    Initials Name Provider Type    SC Jodee Babcock, PT Physical Therapist          Therapy Education  Education Details: progressed HEP, provided red theraband  Given: HEP, Symptoms/condition management, Pain management, Posture/body mechanics, Mobility training, Other (comment)  Program:  New  How Provided: Verbal, Demonstration, Written  Provided to: Patient  Level of Understanding: Teach back education performed, Verbalized, Demonstrated              Time Calculation:   Start Time: 0824 (patient's appointment time was 0815)  Stop Time: 0902  Time Calculation (min): 38 min  Total Timed Code Minutes- PT: 38 minute(s)    Therapy Charges for Today     Code Description Service Date Service Provider Modifiers Qty    99338704473 HC PT THER PROC EA 15 MIN 5/2/2018 Jodee Babcock, PT GP 2    [id number]  PT NEUROMUSC RE EDUCATION EA 15 MIN 5/2/2018 Jodee Babcock, PT GP 1                    Jodee Zavaleta, PT  5/2/2018

## 2018-05-08 ENCOUNTER — HOSPITAL ENCOUNTER (OUTPATIENT)
Dept: PHYSICAL THERAPY | Facility: HOSPITAL | Age: 23
Setting detail: THERAPIES SERIES
Discharge: HOME | End: 2018-05-08

## 2018-05-08 DIAGNOSIS — R29.3 BAD POSTURE: Primary | ICD-10-CM

## 2018-05-08 DIAGNOSIS — R27.9 LACK OF COORDINATION: ICD-10-CM

## 2018-05-08 DIAGNOSIS — R29.898 WEAKNESS OF BACK: ICD-10-CM

## 2018-05-08 DIAGNOSIS — R26.9 ABNORMAL GAIT: ICD-10-CM

## 2018-05-08 DIAGNOSIS — R53.1 WEAKNESS GENERALIZED: ICD-10-CM

## 2018-05-08 DIAGNOSIS — Z86.79 HISTORY OF INTRACRANIAL HEMORRHAGE: ICD-10-CM

## 2018-05-08 DIAGNOSIS — G56.03 BILATERAL CARPAL TUNNEL SYNDROME: ICD-10-CM

## 2018-05-08 DIAGNOSIS — M54.50 ACUTE RIGHT-SIDED LOW BACK PAIN WITHOUT SCIATICA: ICD-10-CM

## 2018-05-08 PROCEDURE — 97112 NEUROMUSCULAR REEDUCATION: CPT

## 2018-05-08 PROCEDURE — 97110 THERAPEUTIC EXERCISES: CPT

## 2018-05-08 NOTE — THERAPY TREATMENT NOTE
Outpatient Physical Therapy Ortho Treatment Note   [facility]     Patient Name: Juli Luna  : 1995  MRN: [id number]  Today's Date: 2018      Visit Date: 2018    Visit Dx:    ICD-10-CM ICD-9-CM   1. Bad posture R29.3 781.92   2. History of intracranial hemorrhage Z86.79 V12.59   3. Bilateral carpal tunnel syndrome G56.03 354.0   4. Acute right-sided low back pain without sciatica M54.5 724.2   5. Weakness generalized R53.1 780.79   6. Weakness of back R29.898 724.8   7. Abnormal gait R26.9 781.2   8. 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   • H/O craniotomy   • Episode of syncope   • Iron deficiency anemia due to chronic blood loss   • Dysphagia        Past Medical History:   Diagnosis Date   • AVM (arteriovenous malformation)    • History of pneumonia         Past Surgical History:   Procedure Laterality Date   • CRANIOTOMY FOR TUMOR Left 2016    Procedure: Posterior fossa suboccipital craniotomy and removal of brain stem arteriovenous malformation;  Surgeon: Josias Regalado MD;  Location: [facility];  Service:    • ENDOSCOPY W/ PEG TUBE PLACEMENT N/A 9/3/2016    Procedure: ESOPHAGOGASTRODUODENOSCOPY WITH PERCUTANEOUS ENDOSCOPIC GASTROSTOMY TUBE INSERTION;  Surgeon: René Ritter MD;  Location: [facility] ENDOSCOPY;  Service:    • TRACHEOSTOMY N/A 2016    Procedure: TRACHEOSTOMY;  Surgeon: Chad Negron MD;  Location: [facility];  Service:                              PT Assessment/Plan     Row Name 18 2814          PT Assessment    Assessment Comments Pt reports reduced low back symptoms with PT exercises and is not taking pain medication as frequently. Pt demonstrates impaired stability with bridges and planks due to weakness in hip and core tissues. Pt with difficulty  maintaining PPT with exercises and requires cueing at times for correct form.   -CN        PT Plan    PT Plan Comments Continue with current program and progress core and hip strengthening as tolerated. Consider hip circles on swiss ball and quadruped UE/LE lift (if no pain in hands).   -CN       User Key  (r) = Recorded By, (t) = Taken By, (c) = Cosigned By    Initials Name Provider Type    CN Morena Dodge, PT Physical Therapist                    Exercises     Row Name 05/08/18 1600             Subjective Comments    Subjective Comments It feels like it is getting better. My doctor gave me pain medicine that I don't have to take as often now.   -CN         Subjective Pain    Able to rate subjective pain? yes  -CN      Pre-Treatment Pain Level 0  -CN         Exercise 1    Exercise Name 1 nustep  -CN      Time 1 5 minutes  -CN      Additional Comments L5 with UEs  -CN         Exercise 3    Exercise Name 3 hip abd standing  -CN      Sets 3 2  -CN      Reps 3 10  -CN      Additional Comments B  -CN         Exercise 4    Exercise Name 4 mini squat  -CN      Reps 4 15  -CN         Exercise 5    Exercise Name 5 shoulder ext  -CN      Sets 5 2  -CN      Reps 5 10  -CN      Additional Comments GTB, B and alt  -CN         Exercise 6    Exercise Name 6 PPT hip add iso ball squeeze bridge  -CN      Reps 6 15  -CN         Exercise 7    Exercise Name 7 hip abd ER TB HL  -CN      Sets 7 3  -CN      Reps 7 10  -CN      Additional Comments RTB, B and alt  -CN         Exercise 8    Exercise Name 8 LTR  -CN      Reps 8 10  -CN      Time 8 5  -CN         Exercise 9    Exercise Name 9 90/90 hamstring stretch  -CN      Reps 9 3  -CN      Time 9 20 sec  -CN         Exercise 10    Exercise Name 10 supine piriformis stretch  -CN      Cueing 10 Verbal  -CN      Reps 10 3  -CN      Time 10 20 sec  -CN         Exercise 11    Exercise Name 11 Supine TA with marches  -CN      Cueing 11 Verbal  -CN      Reps 11 15  -CN          Exercise 12    Exercise Name 12 SL clamshells  -CN      Cueing 12 Verbal  -CN      Reps 12 15  -CN         Exercise 13    Exercise Name 13 Cat/camel  -CN      Cueing 13 Demo  -CN      Reps 13 10  -CN         Exercise 14    Exercise Name 14 Prayer stretch  -CN      Cueing 14 Demo  -CN      Reps 14 3  -CN      Time 14 20 sec  -CN         Exercise 15    Exercise Name 15 Planks  -CN      Cueing 15 Demo  -CN      Reps 15 3  -CN      Time 15 15 sec  -CN        User Key  (r) = Recorded By, (t) = Taken By, (c) = Cosigned By    Initials Name Provider Type    CN Morena Dodge, PT Physical Therapist                               PT OP Goals     Row Name 05/08/18 1600          PT Short Term Goals    STG Date to Achieve 05/24/18  -CN     STG 1 Patient indepencent and compliant with intial HEP focused on core stabilization and flexibility/tendon glides  -CN     STG 1 Progress Met  -CN     STG 2 Patient able to perform TA/PPT with proper form in HL and seated/standing positions to improve posture and form with gym exercise program  -CN     STG 2 Progress Progressing  -CN     STG 2 Progress Comments Continues to require cueing for TA activation and PPT with exercises.   -CN     STG 3 Patient report resolution of lumbar symptoms with improved ergonomic set up to allow for decreased risk of reinjury  -CN     STG 3 Progress Progressing  -CN     STG 4 Patient compliant with bilateral night resting splints to improve sleep and decreased pain in AM  -CN     STG 4 Progress Progressing  -CN        Long Term Goals    LTG Date to Achieve 06/21/18  -CN     LTG 1 Patient independent and compliant with advanced HEP and return to gym exercise program  -CN     LTG 1 Progress Ongoing  -CN     LTG 2 Patient score less or equal 10% on Oswestry LBP questionnaire for improved function  -CN     LTG 2 Progress Ongoing  -CN     LTG 3 Patient with noted improved posture in standing and seated positions without verbal cues for decreased risk of  reinjury and improved form with gym exercise program  -CN     LTG 3 Progress Ongoing  -CN     LTG 4 Patient MMT bilateral hip flexors (SLR) in supine position 5/5 without compensation patterns for improved core stabilization thus improved balance/coordination  -CN     LTG 4 Progress Ongoing  -CN       User Key  (r) = Recorded By, (t) = Taken By, (c) = Cosigned By    Initials Name Provider Type    CN Morena Dodge, PT Physical Therapist          Therapy Education  Given: HEP, Symptoms/condition management, Pain management, Posture/body mechanics, Mobility training, Other (comment)  Program: Progressed  How Provided: Verbal, Demonstration  Provided to: Patient  Level of Understanding: Teach back education performed, Verbalized, Demonstrated              Time Calculation:   Start Time: 1615  Stop Time: 1700  Time Calculation (min): 45 min  Total Timed Code Minutes- PT: 45 minute(s)    Therapy Charges for Today     Code Description Service Date Service Provider Modifiers Qty    92030229605  PT THER PROC EA 15 MIN 5/8/2018 Morena Dodge, PT GP 2    [id number]  PT NEUROMUSC RE EDUCATION EA 15 MIN 5/8/2018 Morena Dodge, PT GP 1                    Morena Dodge, PT  5/8/2018

## 2018-05-10 ENCOUNTER — HOSPITAL ENCOUNTER (OUTPATIENT)
Dept: PHYSICAL THERAPY | Facility: HOSPITAL | Age: 23
Setting detail: THERAPIES SERIES
Discharge: HOME | End: 2018-05-10

## 2018-05-10 DIAGNOSIS — R29.898 WEAKNESS OF BACK: ICD-10-CM

## 2018-05-10 DIAGNOSIS — G56.03 BILATERAL CARPAL TUNNEL SYNDROME: ICD-10-CM

## 2018-05-10 DIAGNOSIS — R27.9 LACK OF COORDINATION: ICD-10-CM

## 2018-05-10 DIAGNOSIS — R53.1 WEAKNESS GENERALIZED: ICD-10-CM

## 2018-05-10 DIAGNOSIS — R26.9 ABNORMAL GAIT: ICD-10-CM

## 2018-05-10 DIAGNOSIS — Z86.79 HISTORY OF INTRACRANIAL HEMORRHAGE: ICD-10-CM

## 2018-05-10 DIAGNOSIS — M54.50 ACUTE RIGHT-SIDED LOW BACK PAIN WITHOUT SCIATICA: ICD-10-CM

## 2018-05-10 DIAGNOSIS — R29.3 BAD POSTURE: Primary | ICD-10-CM

## 2018-05-10 PROCEDURE — 97112 NEUROMUSCULAR REEDUCATION: CPT

## 2018-05-10 PROCEDURE — 97110 THERAPEUTIC EXERCISES: CPT

## 2018-05-10 NOTE — THERAPY TREATMENT NOTE
Outpatient Physical Therapy Ortho Treatment Note   [facility]     Patient Name: Juli Luna  : 1995  MRN: [id number]  Today's Date: 5/10/2018      Visit Date: 05/10/2018    Visit Dx:    ICD-10-CM ICD-9-CM   1. Bad posture R29.3 781.92   2. History of intracranial hemorrhage Z86.79 V12.59   3. Bilateral carpal tunnel syndrome G56.03 354.0   4. Acute right-sided low back pain without sciatica M54.5 724.2   5. Weakness generalized R53.1 780.79   6. Weakness of back R29.898 724.8   7. Abnormal gait R26.9 781.2   8. 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   • H/O craniotomy   • Episode of syncope   • Iron deficiency anemia due to chronic blood loss   • Dysphagia        Past Medical History:   Diagnosis Date   • AVM (arteriovenous malformation)    • History of pneumonia         Past Surgical History:   Procedure Laterality Date   • CRANIOTOMY FOR TUMOR Left 2016    Procedure: Posterior fossa suboccipital craniotomy and removal of brain stem arteriovenous malformation;  Surgeon: Josias Regalado MD;  Location: [facility];  Service:    • ENDOSCOPY W/ PEG TUBE PLACEMENT N/A 9/3/2016    Procedure: ESOPHAGOGASTRODUODENOSCOPY WITH PERCUTANEOUS ENDOSCOPIC GASTROSTOMY TUBE INSERTION;  Surgeon: René Ritter MD;  Location: [facility] ENDOSCOPY;  Service:    • TRACHEOSTOMY N/A 2016    Procedure: TRACHEOSTOMY;  Surgeon: Chad Negron MD;  Location: [facility];  Service:                              PT Assessment/Plan     Row Name 05/10/18 1614          PT Assessment    Assessment Comments Pt reports no pain this session and continued with core and hip strength and stability. Added quadruped alt UE lift with TA and neutral spine as well as hip circles on swiss ball. Pt required cueing for TA activation vs  compensating with LE musculature, however able to perform correctly.   -CN        PT Plan    PT Plan Comments Continue with hip and core strengthening and stability as tolerated.   -CN       User Key  (r) = Recorded By, (t) = Taken By, (c) = Cosigned By    Initials Name Provider Type    CN Morena Dodge, PT Physical Therapist                    Exercises     Row Name 05/10/18 1500             Subjective Comments    Subjective Comments I woke up at 5 am and was hurting. I thought about getting up to take a pain pill, but fell back asleep. It felt better when I woke up. Pt arrived at 3:40 for 3:30 appointment.   -CN         Subjective Pain    Able to rate subjective pain? yes  -CN      Pre-Treatment Pain Level 0  -CN         Exercise 1    Exercise Name 1 nustep  -CN      Time 1 5 minutes  -CN      Additional Comments L5 with UEs  -CN         Exercise 6    Exercise Name 6 PPT hip add iso ball squeeze bridge  -CN      Reps 6 15  -CN         Exercise 7    Exercise Name 7 hip abd ER TB HL  -CN      Sets 7 3  -CN      Reps 7 10  -CN      Additional Comments RTB, B and alt  -CN         Exercise 8    Exercise Name 8 LTR  -CN      Reps 8 10  -CN      Time 8 5  -CN         Exercise 9    Exercise Name 9 90/90 hamstring stretch  -CN      Reps 9 3  -CN      Time 9 20 sec  -CN         Exercise 10    Exercise Name 10 supine piriformis stretch  -CN      Cueing 10 Verbal  -CN      Reps 10 3  -CN      Time 10 20 sec  -CN         Exercise 11    Exercise Name 11 Supine TA with marches  -CN      Cueing 11 Verbal  -CN      Reps 11 15  -CN         Exercise 12    Exercise Name 12 SL clamshells  -CN      Cueing 12 Verbal  -CN      Reps 12 15  -CN         Exercise 13    Exercise Name 13 Cat/camel  -CN      Cueing 13 Demo  -CN      Reps 13 10  -CN         Exercise 16    Exercise Name 16 Quadruped alt UE lift  -CN      Cueing 16 Demo  -CN      Reps 16 10 ea  -CN         Exercise 17    Exercise Name 17 Swiss ball hip circles  -CN       Cueing 17 Demo  -CN      Reps 17 10 ea  -CN        User Key  (r) = Recorded By, (t) = Taken By, (c) = Cosigned By    Initials Name Provider Type    MICHELLE Dodge, SOFY Physical Therapist                             Therapy Education  Given: HEP, Symptoms/condition management, Pain management, Posture/body mechanics, Mobility training, Other (comment)  Program: Progressed  How Provided: Verbal, Demonstration  Provided to: Patient  Level of Understanding: Teach back education performed, Verbalized, Demonstrated              Time Calculation:   Start Time: 1540  Stop Time: 1615  Time Calculation (min): 35 min  Total Timed Code Minutes- PT: 35 minute(s)    Therapy Charges for Today     Code Description Service Date Service Provider Modifiers Qty    71996959130 HC PT THER PROC EA 15 MIN 5/10/2018 Morena Dodge, PT GP 1    58023233694 HC PT NEUROMUSC RE EDUCATION EA 15 MIN 5/10/2018 Morena Dodge, PT GP 1                    Morena Dodge PT  5/10/2018

## 2018-05-22 ENCOUNTER — HOSPITAL ENCOUNTER (OUTPATIENT)
Dept: PHYSICAL THERAPY | Facility: HOSPITAL | Age: 23
Setting detail: THERAPIES SERIES
Discharge: HOME | End: 2018-05-22

## 2018-05-22 DIAGNOSIS — R27.9 LACK OF COORDINATION: ICD-10-CM

## 2018-05-22 DIAGNOSIS — Z86.79 HISTORY OF INTRACRANIAL HEMORRHAGE: ICD-10-CM

## 2018-05-22 DIAGNOSIS — R29.898 WEAKNESS OF BACK: ICD-10-CM

## 2018-05-22 DIAGNOSIS — R29.3 BAD POSTURE: Primary | ICD-10-CM

## 2018-05-22 DIAGNOSIS — G56.03 BILATERAL CARPAL TUNNEL SYNDROME: ICD-10-CM

## 2018-05-22 DIAGNOSIS — R26.9 ABNORMAL GAIT: ICD-10-CM

## 2018-05-22 DIAGNOSIS — R53.1 WEAKNESS GENERALIZED: ICD-10-CM

## 2018-05-22 DIAGNOSIS — M54.50 ACUTE RIGHT-SIDED LOW BACK PAIN WITHOUT SCIATICA: ICD-10-CM

## 2018-05-22 PROCEDURE — 97110 THERAPEUTIC EXERCISES: CPT

## 2018-05-22 NOTE — THERAPY TREATMENT NOTE
Outpatient Physical Therapy Ortho Treatment Note   [facility]     Patient Name: Juli Luna  : 1995  MRN: [id number]  Today's Date: 2018      Visit Date: 2018    Visit Dx:    ICD-10-CM ICD-9-CM   1. Bad posture R29.3 781.92   2. History of intracranial hemorrhage Z86.79 V12.59   3. Bilateral carpal tunnel syndrome G56.03 354.0   4. Acute right-sided low back pain without sciatica M54.5 724.2   5. Weakness generalized R53.1 780.79   6. Weakness of back R29.898 724.8   7. Abnormal gait R26.9 781.2   8. 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   • H/O craniotomy   • Episode of syncope   • Iron deficiency anemia due to chronic blood loss   • Dysphagia        Past Medical History:   Diagnosis Date   • AVM (arteriovenous malformation)    • History of pneumonia         Past Surgical History:   Procedure Laterality Date   • CRANIOTOMY FOR TUMOR Left 2016    Procedure: Posterior fossa suboccipital craniotomy and removal of brain stem arteriovenous malformation;  Surgeon: Josias Regalado MD;  Location: [facility];  Service:    • ENDOSCOPY W/ PEG TUBE PLACEMENT N/A 9/3/2016    Procedure: ESOPHAGOGASTRODUODENOSCOPY WITH PERCUTANEOUS ENDOSCOPIC GASTROSTOMY TUBE INSERTION;  Surgeon: René Ritter MD;  Location: [facility] ENDOSCOPY;  Service:    • TRACHEOSTOMY N/A 2016    Procedure: TRACHEOSTOMY;  Surgeon: Chad Negron MD;  Location: [facility];  Service:                              PT Assessment/Plan     Row Name 18 1128          PT Assessment    Assessment Comments Pt on vacation last week and reports taking 1 pain pill every day due to slight increase pain sitting in car for 13 hours and sleeping on different matress. Pt has not taken pain pill since returning on Friday and reports no  pain since then. Continued with current program and added swiss ball marches.  -CN        PT Plan    PT Plan Comments Continue with core and hip strengthening and likely D/C to independent HEP next week.   -CN       User Key  (r) = Recorded By, (t) = Taken By, (c) = Cosigned By    Initials Name Provider Type    CN Morena Dodge, PT Physical Therapist                    Exercises     Row Name 05/22/18 1100             Subjective Comments    Subjective Comments I was on vacation last week and it started hurting a little bit on the 13 hour car ride. I took 1 pain pill a day while I was there because I was sleeping on a new bed and spent more time in the car. I didn't do my exercises while I was there. I got back on Friday and haven't taken any pain pills since then and it hasn't been hurting. Pt arrived at 10:58 for 10:45 appointment.   -CN         Subjective Pain    Able to rate subjective pain? yes  -CN      Pre-Treatment Pain Level 0  -CN         Exercise 1    Exercise Name 1 nustep  -CN      Time 1 5 minutes  -CN      Additional Comments L5 with UEs  -CN         Exercise 6    Exercise Name 6 PPT hip add iso ball squeeze bridge  -CN      Reps 6 15  -CN         Exercise 7    Exercise Name 7 hip abd ER TB HL  -CN      Sets 7 3  -CN      Reps 7 10  -CN      Additional Comments RTB, B and alt  -CN         Exercise 8    Exercise Name 8 LTR  -CN      Reps 8 10  -CN      Time 8 5  -CN         Exercise 9    Exercise Name 9 90/90 hamstring stretch  -CN      Reps 9 3  -CN      Time 9 20 sec  -CN         Exercise 10    Exercise Name 10 supine piriformis stretch  -CN      Cueing 10 Verbal  -CN      Reps 10 3  -CN      Time 10 20 sec  -CN         Exercise 11    Exercise Name 11 Supine TA with marches  -CN      Cueing 11 Verbal  -CN      Reps 11 15  -CN         Exercise 12    Exercise Name 12 SL clamshells  -CN      Cueing 12 Verbal  -CN      Reps 12 15  -CN         Exercise 13    Exercise Name 13 Cat/camel  -CN       Cueing 13 Demo  -CN      Reps 13 10  -CN         Exercise 16    Exercise Name 16 Quadruped alt UE lift  -CN      Cueing 16 Demo  -CN      Reps 16 10 ea  -CN         Exercise 17    Exercise Name 17 Swiss ball hip circles  -CN      Cueing 17 Demo  -CN      Reps 17 20 ea  -CN         Exercise 18    Exercise Name 18 Swiss ball marches  -CN      Cueing 18 Verbal  -CN      Reps 18 15  -CN        User Key  (r) = Recorded By, (t) = Taken By, (c) = Cosigned By    Initials Name Provider Type    CN Morena Dodge, PT Physical Therapist                               PT OP Goals     Row Name 05/22/18 1100          PT Short Term Goals    STG Date to Achieve 05/24/18  -CN     STG 1 Patient indepencent and compliant with intial HEP focused on core stabilization and flexibility/tendon glides  -CN     STG 1 Progress Met  -CN     STG 2 Patient able to perform TA/PPT with proper form in HL and seated/standing positions to improve posture and form with gym exercise program  -CN     STG 2 Progress Progressing  -CN     STG 3 Patient report resolution of lumbar symptoms with improved ergonomic set up to allow for decreased risk of reinjury  -CN     STG 3 Progress Progressing  -CN     STG 4 Patient compliant with bilateral night resting splints to improve sleep and decreased pain in AM  -CN     STG 4 Progress Progressing  -CN        Long Term Goals    LTG Date to Achieve 06/21/18  -CN     LTG 1 Patient independent and compliant with advanced HEP and return to gym exercise program  -CN     LTG 1 Progress Ongoing  -CN     LTG 2 Patient score less or equal 10% on Oswestry LBP questionnaire for improved function  -CN     LTG 2 Progress Ongoing  -CN     LTG 3 Patient with noted improved posture in standing and seated positions without verbal cues for decreased risk of reinjury and improved form with gym exercise program  -CN     LTG 3 Progress Progressing  -CN     LTG 3 Progress Comments Slight improvement in posture with exercises.    -CN     LTG 4 Patient MMT bilateral hip flexors (SLR) in supine position 5/5 without compensation patterns for improved core stabilization thus improved balance/coordination  -CN     LTG 4 Progress Ongoing  -CN       User Key  (r) = Recorded By, (t) = Taken By, (c) = Cosigned By    Initials Name Provider Type    MICHELLE Dodge, PT Physical Therapist          Therapy Education  Given: HEP, Symptoms/condition management, Pain management, Posture/body mechanics, Mobility training, Other (comment)  Program: Progressed  How Provided: Verbal, Demonstration  Provided to: Patient  Level of Understanding: Teach back education performed, Verbalized, Demonstrated              Time Calculation:   Start Time: 1058  Stop Time: 1130  Time Calculation (min): 32 min  Total Timed Code Minutes- PT: 32 minute(s)    Therapy Charges for Today     Code Description Service Date Service Provider Modifiers Qty    55999915201  PT THER PROC EA 15 MIN 5/22/2018 Morena Dodge, PT GP 2                    Morena Dodge, SOFY  5/22/2018

## 2018-05-24 ENCOUNTER — APPOINTMENT (OUTPATIENT)
Dept: PHYSICAL THERAPY | Facility: HOSPITAL | Age: 23
End: 2018-05-24

## 2018-05-29 ENCOUNTER — HOSPITAL ENCOUNTER (OUTPATIENT)
Dept: PHYSICAL THERAPY | Facility: HOSPITAL | Age: 23
Setting detail: THERAPIES SERIES
Discharge: HOME | End: 2018-05-29

## 2018-05-29 DIAGNOSIS — M54.50 ACUTE RIGHT-SIDED LOW BACK PAIN WITHOUT SCIATICA: ICD-10-CM

## 2018-05-29 DIAGNOSIS — Z86.79 HISTORY OF INTRACRANIAL HEMORRHAGE: ICD-10-CM

## 2018-05-29 DIAGNOSIS — R26.9 ABNORMAL GAIT: ICD-10-CM

## 2018-05-29 DIAGNOSIS — R27.9 LACK OF COORDINATION: ICD-10-CM

## 2018-05-29 DIAGNOSIS — R53.1 WEAKNESS GENERALIZED: ICD-10-CM

## 2018-05-29 DIAGNOSIS — G56.03 BILATERAL CARPAL TUNNEL SYNDROME: ICD-10-CM

## 2018-05-29 DIAGNOSIS — R29.898 WEAKNESS OF BACK: ICD-10-CM

## 2018-05-29 DIAGNOSIS — R29.3 BAD POSTURE: Primary | ICD-10-CM

## 2018-05-29 PROCEDURE — 97110 THERAPEUTIC EXERCISES: CPT

## 2018-05-29 NOTE — THERAPY TREATMENT NOTE
Outpatient Physical Therapy Ortho Treatment Note   [facility]     Patient Name: Juli Luna  : 1995  MRN: [id number]  Today's Date: 2018      Visit Date: 2018    Visit Dx:    ICD-10-CM ICD-9-CM   1. Bad posture R29.3 781.92   2. History of intracranial hemorrhage Z86.79 V12.59   3. Bilateral carpal tunnel syndrome G56.03 354.0   4. Acute right-sided low back pain without sciatica M54.5 724.2   5. Weakness generalized R53.1 780.79   6. Weakness of back R29.898 724.8   7. Abnormal gait R26.9 781.2   8. 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   • H/O craniotomy   • Episode of syncope   • Iron deficiency anemia due to chronic blood loss   • Dysphagia        Past Medical History:   Diagnosis Date   • AVM (arteriovenous malformation)    • History of pneumonia         Past Surgical History:   Procedure Laterality Date   • CRANIOTOMY FOR TUMOR Left 2016    Procedure: Posterior fossa suboccipital craniotomy and removal of brain stem arteriovenous malformation;  Surgeon: Josias Regalado MD;  Location: [facility];  Service:    • ENDOSCOPY W/ PEG TUBE PLACEMENT N/A 9/3/2016    Procedure: ESOPHAGOGASTRODUODENOSCOPY WITH PERCUTANEOUS ENDOSCOPIC GASTROSTOMY TUBE INSERTION;  Surgeon: René Ritter MD;  Location: [facility] ENDOSCOPY;  Service:    • TRACHEOSTOMY N/A 2016    Procedure: TRACHEOSTOMY;  Surgeon: Chad Negron MD;  Location: [facility];  Service:                              PT Assessment/Plan     Row Name 18 1057          PT Assessment    Assessment Comments Pr reports slight increase in pain over past two night while trying to get to sleep. Advised pt to perform stretches prior to going to bed to see if pain is relieved. Pt reports decreased dexterity, strength and coordination  in R hand and added several exercises for hand/wrist strength today. Also added several balance exercises in // bars and pt with significant balance deficits in standing.   -CN        PT Plan    PT Plan Comments Assess response to added exercises as well as pain at night. Progress balance and R hand/wrist strengthening.   -CN       User Key  (r) = Recorded By, (t) = Taken By, (c) = Cosigned By    Initials Name Provider Type    CN Morena Dodge, PT Physical Therapist                    Exercises     Row Name 05/29/18 1000             Subjective Comments    Subjective Comments It has hurt a little bit for the past two nights as I'm trying to get to sleep. Only a 2/10 and I haven't taken any pain meds.   -CN         Subjective Pain    Able to rate subjective pain? yes  -CN      Pre-Treatment Pain Level 0  -CN         Exercise 1    Exercise Name 1 nustep  -CN      Time 1 5 minutes  -CN      Additional Comments L5 with UEs  -CN         Exercise 2    Exercise Name 2 Narrow MARIA DEL CARMEN on blue foam  -CN      Cueing 2 Demo  -CN      Reps 2 3  -CN      Time 2 20 sec  -CN         Exercise 3    Exercise Name 3 Stagger stance with trunk rotation  -CN      Cueing 3 Demo  -CN      Reps 3 10 ea  -CN      Additional Comments L1 ball  -CN         Exercise 6    Exercise Name 6 PPT hip add iso ball squeeze bridge  -CN      Reps 6 15  -CN         Exercise 7    Exercise Name 7 hip abd ER TB HL  -CN      Sets 7 3  -CN      Reps 7 10  -CN      Additional Comments GTB, B and alt  -CN         Exercise 8    Exercise Name 8 LTR  -CN      Reps 8 10  -CN      Time 8 5  -CN         Exercise 9    Exercise Name 9 90/90 hamstring stretch  -CN      Reps 9 3  -CN      Time 9 20 sec  -CN         Exercise 10    Exercise Name 10 supine piriformis stretch  -CN      Cueing 10 Verbal  -CN      Reps 10 3  -CN      Time 10 20 sec  -CN         Exercise 11    Exercise Name 11 Supine TA with marches  -CN      Cueing 11 Verbal  -CN      Reps 11 15  -CN          Exercise 12    Exercise Name 12 SL clamshells  -CN      Cueing 12 Verbal  -CN      Reps 12 15  -CN         Exercise 14    Exercise Name 14 Clothespin red  -CN      Cueing 14 Demo  -CN      Reps 14 10 ea finger  -CN         Exercise 15    Exercise Name 15 finger extension with rubber band  -CN      Cueing 15 Demo  -CN      Reps 15 10  -CN         Exercise 19    Exercise Name 19 Wrist flexion, extension and radial/ulnar deviation  -CN      Cueing 19 Demo  -CN      Reps 19 10  -CN      Additional Comments 2#  -CN         Exercise 20    Exercise Name 20 Pronation/supination with mallet  -CN      Cueing 20 Demo  -CN      Reps 20 10  -CN        User Key  (r) = Recorded By, (t) = Taken By, (c) = Cosigned By    Initials Name Provider Type    MICHELLE Dodge, PT Physical Therapist                               PT OP Goals     Row Name 05/29/18 1000          PT Short Term Goals    STG Date to Achieve 05/24/18  -CN     STG 1 Patient indepencent and compliant with intial HEP focused on core stabilization and flexibility/tendon glides  -CN     STG 1 Progress Met  -CN     STG 2 Patient able to perform TA/PPT with proper form in HL and seated/standing positions to improve posture and form with gym exercise program  -CN     STG 2 Progress Partially Met  -CN     STG 2 Progress Comments Requires cueing at times, however able to perform with correct mechanics.   -CN     STG 3 Patient report resolution of lumbar symptoms with improved ergonomic set up to allow for decreased risk of reinjury  -CN     STG 3 Progress Met  -CN     STG 3 Progress Comments Pt reports reduced symptoms at work and standing prn for pain relief.   -CN     STG 4 Patient compliant with bilateral night resting splints to improve sleep and decreased pain in AM  -CN     STG 4 Progress Met  -CN     STG 4 Progress Comments Pt wearing R wrist splint every night with decreasead symptoms.   -CN        Long Term Goals    LTG Date to Achieve 06/21/18  -CN     LTG  1 Patient independent and compliant with advanced HEP and return to gym exercise program  -CN     LTG 1 Progress Progressing  -CN     LTG 1 Progress Comments Progressing trunk, hip and hand HEP.   -CN     LTG 2 Patient score less or equal 10% on Oswestry LBP questionnaire for improved function  -CN     LTG 2 Progress Ongoing  -CN     LTG 3 Patient with noted improved posture in standing and seated positions without verbal cues for decreased risk of reinjury and improved form with gym exercise program  -CN     LTG 3 Progress Progressing  -CN     LTG 4 Patient MMT bilateral hip flexors (SLR) in supine position 5/5 without compensation patterns for improved core stabilization thus improved balance/coordination  -CN     LTG 4 Progress Ongoing  -CN       User Key  (r) = Recorded By, (t) = Taken By, (c) = Cosigned By    Initials Name Provider Type    MICHELLE Dodge, PT Physical Therapist          Therapy Education  Given: HEP, Symptoms/condition management, Pain management, Posture/body mechanics, Mobility training, Other (comment)  Program: Progressed  How Provided: Verbal, Demonstration  Provided to: Patient  Level of Understanding: Teach back education performed, Verbalized, Demonstrated              Time Calculation:   Start Time: 1002  Stop Time: 1045  Time Calculation (min): 43 min  Total Timed Code Minutes- PT: 43 minute(s)    Therapy Charges for Today     Code Description Service Date Service Provider Modifiers Qty    [id number]  PT THER PROC EA 15 MIN 5/29/2018 Morena Dodge, PT GP 3                    Morena Dodge, SOFY  5/29/2018

## 2018-06-06 ENCOUNTER — HOSPITAL ENCOUNTER (OUTPATIENT)
Dept: PHYSICAL THERAPY | Facility: HOSPITAL | Age: 23
Setting detail: THERAPIES SERIES
Discharge: HOME | End: 2018-06-06

## 2018-06-06 DIAGNOSIS — R26.9 ABNORMAL GAIT: ICD-10-CM

## 2018-06-06 DIAGNOSIS — G56.03 BILATERAL CARPAL TUNNEL SYNDROME: ICD-10-CM

## 2018-06-06 DIAGNOSIS — R29.3 BAD POSTURE: Primary | ICD-10-CM

## 2018-06-06 DIAGNOSIS — R29.898 WEAKNESS OF BACK: ICD-10-CM

## 2018-06-06 DIAGNOSIS — R27.9 LACK OF COORDINATION: ICD-10-CM

## 2018-06-06 DIAGNOSIS — Z86.79 HISTORY OF INTRACRANIAL HEMORRHAGE: ICD-10-CM

## 2018-06-06 DIAGNOSIS — R53.1 WEAKNESS GENERALIZED: ICD-10-CM

## 2018-06-06 DIAGNOSIS — M54.50 ACUTE RIGHT-SIDED LOW BACK PAIN WITHOUT SCIATICA: ICD-10-CM

## 2018-06-06 PROCEDURE — 97110 THERAPEUTIC EXERCISES: CPT

## 2018-06-06 NOTE — THERAPY PROGRESS REPORT/RE-CERT
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;  Service:    • ENDOSCOPY W/ PEG TUBE PLACEMENT N/A 9/3/2016    Procedure: ESOPHAGOGASTRODUODENOSCOPY WITH PERCUTANEOUS ENDOSCOPIC GASTROSTOMY TUBE INSERTION;  Surgeon: René Ritter MD;  Location: [facility] ENDOSCOPY;  Service:    • TRACHEOSTOMY N/A 2016    Procedure: TRACHEOSTOMY;  Surgeon: Chad Negron MD;  Location: [facility] OR;  Service:        Visit Dx:     ICD-10-CM ICD-9-CM   1. Bad posture R29.3 781.92   2. History of intracranial hemorrhage Z86.79 V12.59   3. Bilateral carpal tunnel syndrome G56.03 354.0   4. Acute right-sided low back pain without sciatica M54.5 724.2   5. Weakness generalized R53.1 780.79   6. Weakness of back R29.898 724.8   7. Abnormal gait R26.9 781.2   8. Lack of coordination R27.9 781.3                           Therapy Education  Given: HEP, Symptoms/condition management, Pain management, Posture/body mechanics, Mobility training, Other (comment)  Program: Progressed  How Provided: Verbal, Demonstration  Provided to: Patient  Level of Understanding: Teach back education performed, Verbalized, Demonstrated           PT OP  Goals     Row Name 06/06/18 1000          PT Short Term Goals    STG Date to Achieve 05/24/18  -CN     STG 1 Patient indepencent and compliant with intial HEP focused on core stabilization and flexibility/tendon glides  -CN     STG 1 Progress Met  -CN     STG 2 Patient able to perform TA/PPT with proper form in HL and seated/standing positions to improve posture and form with gym exercise program  -CN     STG 2 Progress Partially Met  -CN     STG 3 Patient report resolution of lumbar symptoms with improved ergonomic set up to allow for decreased risk of reinjury  -CN     STG 3 Progress Met  -CN     STG 4 Patient compliant with bilateral night resting splints to improve sleep and decreased pain in AM  -CN     STG 4 Progress Met  -CN        Long Term Goals    LTG Date to Achieve 06/21/18  -CN     LTG 1 Patient independent and compliant with advanced HEP and return to gym exercise program  -CN     LTG 1 Progress Progressing  -CN     LTG 1 Progress Comments Continuing to progress stability and strengthening HEP.   -CN     LTG 2 Patient score less or equal 10% on Oswestry LBP questionnaire for improved function  -CN     LTG 2 Progress Progressing  -CN     LTG 2 Progress Comments Pt scored 16% where 0% is no disability.   -CN     LTG 3 Patient with noted improved posture in standing and seated positions without verbal cues for decreased risk of reinjury and improved form with gym exercise program  -CN     LTG 3 Progress Progressing  -CN     LTG 3 Progress Comments Pt continues with poor posture due to decreased core stability.   -CN     LTG 4 Patient MMT bilateral hip flexors (SLR) in supine position 5/5 without compensation patterns for improved core stabilization thus improved balance/coordination  -CN     LTG 4 Progress Ongoing  -CN     LTG 4 Progress Comments Pt continues with weakness in hip flexors, rated 4+/5 on the R and 4/5 on the L.   -CN       User Key  (r) = Recorded By, (t) = Taken By, (c) = Cosigned By     Initials Name Provider Type    CN Morena Dodge PT Physical Therapist                PT Assessment/Plan     Row Name 06/06/18 1025          PT Assessment    Functional Limitations Decreased safety during functional activities;Limitation in home management;Limitations in community activities;Limitations in functional capacity and performance;Performance in leisure activities;Performance in sport activities;Performance in work activities  -CN     Impairments Balance;Dexterity;Muscle strength;Pain;Peripheral nerve integrity;Poor body mechanics;Posture  -CN     Assessment Comments Pt has attended 7 skilled therapy sessions for treatment of low back pain, impaired balance and B CTS. Pt with overall improvement in stability as noted by improved fliudity of movement with exercises. Pt went on vacation and had very little pain following return, however increased symptoms in the morning and at night since then. Pt reports increased pain while getting ready in am as well as washing face at night. Pt also with continued difficulty getting to sleep due to pain (typically side sleeper or prone). Pt also c/o pain with driving at times and is taking pain meds prn. Pt scored 16% on the Oswestry where 0% is no disability and would benefit from continued skilled PT to address the deficits and return to PLOF.   -CN     Please refer to paper survey for additional self-reported information Yes  -CN     Rehab Potential Good  -CN     Patient/caregiver participated in establishment of treatment plan and goals Yes  -CN     Patient would benefit from skilled therapy intervention Yes  -CN        PT Plan    PT Frequency 1x/week;2x/week  -CN     Predicted Duration of Therapy Intervention (OT Eval) 4 weeks  -CN     Planned CPT's? PT EVAL LOW COMPLEXITY: 80035;PT RE-EVAL: 60631;PT THER PROC EA 15 MIN: 32597;PT THER ACT EA 15 MIN: 63645;PT MANUAL THERAPY EA 15 MIN: 31158;PT NEUROMUSC RE-EDUCATION EA 15 MIN: 75854;PT GAIT TRAINING EA 15  MIN: 16023;PT AQUATIC THERAPY EA 15 MIN: 65809;PT HOT OR COLD PACK TREAT MCARE  -CN     PT Plan Comments PT to continue treating 1-2x/week consisting of strengthening, stability and postural education activities. Assess response to prone press up next visit.   -CN       User Key  (r) = Recorded By, (t) = Taken By, (c) = Cosigned By    Initials Name Provider Type    CN Morena Dodge, PT Physical Therapist                  Exercises     Row Name 06/06/18 1000             Subjective Comments    Subjective Comments It has been hurting in the morning and at night. I notice it when I am bending over the sink to wash my face.   -CN         Subjective Pain    Able to rate subjective pain? yes  -CN      Pre-Treatment Pain Level 3  -CN         Exercise 1    Exercise Name 1 nustep  -CN      Time 1 5 minutes  -CN      Additional Comments L5 with UEs  -CN         Exercise 2    Exercise Name 2 Narrow MARIA DEL CARMEN on blue foam  -CN      Cueing 2 Demo  -CN      Reps 2 3  -CN      Time 2 20 sec  -CN         Exercise 3    Exercise Name 3 Stagger stance with trunk rotation  -CN      Cueing 3 Demo  -CN      Reps 3 10 ea  -CN      Additional Comments L1 ball  -CN         Exercise 4    Exercise Name 4 Seated with slight recline and small ball circles  -CN      Cueing 4 Demo  -CN      Reps 4 10 ea  -CN      Additional Comments L1 ball  -CN         Exercise 5    Exercise Name 5 Shoulder extension on blue foam with TA  -CN      Sets 5 2  -CN      Reps 5 10  -CN         Exercise 6    Exercise Name 6 PPT hip add iso ball squeeze bridge  -CN      Reps 6 15  -CN         Exercise 7    Exercise Name 7 hip abd ER TB HL  -CN      Sets 7 3  -CN      Reps 7 10  -CN      Additional Comments GTB, B and alt  -CN         Exercise 8    Exercise Name 8 LTR  -CN      Reps 8 10  -CN      Time 8 5  -CN         Exercise 12    Exercise Name 12 SL clamshells  -CN      Cueing 12 Verbal  -CN      Reps 12 15  -CN         Exercise 13    Exercise Name 13 Quadruped  alt LE lift with TA and bar  -CN      Cueing 13 Demo  -CN      Reps 13 10 ea  -CN         Exercise 19    Exercise Name 19 SLS in // bars with 1 UE assist  -CN      Cueing 19 Demo  -CN      Reps 19 3  -CN      Time 19 20 sec  -CN         Exercise 20    Exercise Name 20 Prone press ups  -CN      Cueing 20 Demo  -CN      Reps 20 10  -CN        User Key  (r) = Recorded By, (t) = Taken By, (c) = Cosigned By    Initials Name Provider Type    CN Morena Dodge, PT Physical Therapist                        Outcome Measure Options: Modjustice Borja  Modified Oswestry  Modified Oswestry Score/Comments: 16% where 0% is no disability      Time Calculation:   Start Time: 1015  Stop Time: 1100  Time Calculation (min): 45 min  Total Timed Code Minutes- PT: 45 minute(s)     Therapy Charges for Today     Code Description Service Date Service Provider Modifiers Qty    04208561208 HC PT THER PROC EA 15 MIN 6/6/2018 Morena Dodge, PT GP 3          PT G-Codes  Outcome Measure Options: Modifed Owestry         Morena Dodge, PT  6/6/2018

## 2018-06-13 ENCOUNTER — HOSPITAL ENCOUNTER (OUTPATIENT)
Dept: PHYSICAL THERAPY | Facility: HOSPITAL | Age: 23
Setting detail: THERAPIES SERIES
Discharge: HOME | End: 2018-06-13

## 2018-06-13 DIAGNOSIS — Z86.79 HISTORY OF INTRACRANIAL HEMORRHAGE: ICD-10-CM

## 2018-06-13 DIAGNOSIS — M54.50 ACUTE RIGHT-SIDED LOW BACK PAIN WITHOUT SCIATICA: ICD-10-CM

## 2018-06-13 DIAGNOSIS — R53.1 WEAKNESS GENERALIZED: ICD-10-CM

## 2018-06-13 DIAGNOSIS — R27.9 LACK OF COORDINATION: ICD-10-CM

## 2018-06-13 DIAGNOSIS — R26.9 ABNORMAL GAIT: ICD-10-CM

## 2018-06-13 DIAGNOSIS — R29.898 WEAKNESS OF BACK: ICD-10-CM

## 2018-06-13 DIAGNOSIS — G56.03 BILATERAL CARPAL TUNNEL SYNDROME: ICD-10-CM

## 2018-06-13 DIAGNOSIS — R29.3 BAD POSTURE: Primary | ICD-10-CM

## 2018-06-13 PROCEDURE — 97112 NEUROMUSCULAR REEDUCATION: CPT

## 2018-06-13 PROCEDURE — 97110 THERAPEUTIC EXERCISES: CPT

## 2018-06-13 NOTE — THERAPY TREATMENT NOTE
Outpatient Physical Therapy Ortho Treatment Note  [facility]     Patient Name: Juli Luna  : 1995  MRN: [id number]  Today's Date: 2018      Visit Date: 2018    Visit Dx:    ICD-10-CM ICD-9-CM   1. Bad posture R29.3 781.92   2. History of intracranial hemorrhage Z86.79 V12.59   3. Bilateral carpal tunnel syndrome G56.03 354.0   4. Acute right-sided low back pain without sciatica M54.5 724.2   5. Weakness generalized R53.1 780.79   6. Weakness of back R29.898 724.8   7. Abnormal gait R26.9 781.2   8. 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   • H/O craniotomy   • Episode of syncope   • Iron deficiency anemia due to chronic blood loss   • Dysphagia        Past Medical History:   Diagnosis Date   • AVM (arteriovenous malformation)    • History of pneumonia         Past Surgical History:   Procedure Laterality Date   • CRANIOTOMY FOR TUMOR Left 2016    Procedure: Posterior fossa suboccipital craniotomy and removal of brain stem arteriovenous malformation;  Surgeon: Josias Regalado MD;  Location: [facility];  Service:    • ENDOSCOPY W/ PEG TUBE PLACEMENT N/A 9/3/2016    Procedure: ESOPHAGOGASTRODUODENOSCOPY WITH PERCUTANEOUS ENDOSCOPIC GASTROSTOMY TUBE INSERTION;  Surgeon: René Ritter MD;  Location: [facility] ENDOSCOPY;  Service:    • TRACHEOSTOMY N/A 2016    Procedure: TRACHEOSTOMY;  Surgeon: Chad Negron MD;  Location: [facility];  Service:                              PT Assessment/Plan     Row Name 18 1034          PT Assessment    Assessment Comments Pt reporting improved pain with press ups at home, however did not dissipate completely. Continued with core strenghtening and neuro reeducation for balance and stability impairments. Pt with decreased balance reactions and  stability in SLS and added several exercises to challenge deficits.   -CN        PT Plan    PT Plan Comments Assess response to added exercises and progress as tolerated.   -CN       User Key  (r) = Recorded By, (t) = Taken By, (c) = Cosigned By    Initials Name Provider Type    CN Morena Dodge, PT Physical Therapist                    Exercises     Row Name 06/13/18 1000             Subjective Comments    Subjective Comments It has been feeling better. I tried those press ups when it was hurting and it felt better after that.   -CN         Subjective Pain    Able to rate subjective pain? yes  -CN      Pre-Treatment Pain Level 0  -CN         Total Minutes    49857 - PT Therapeutic Exercise Minutes 25  -CN      14488 -  PT Neuromuscular Reeducation Minutes 20  -CN         Exercise 1    Exercise Name 1 nustep  -CN      Time 1 5 minutes  -CN      Additional Comments L5 with UEs  -CN         Exercise 2    Exercise Name 2 Narrow MARIA DEL CARMEN on blue foam  -CN      Cueing 2 Demo  -CN      Reps 2 3  -CN      Time 2 20 sec  -CN         Exercise 3    Exercise Name 3 Stagger stance with trunk rotation on upside down bosu  -CN      Cueing 3 Demo  -CN      Reps 3 10 ea  -CN      Additional Comments L1 ball  -CN         Exercise 4    Exercise Name 4 Seated with slight recline and small ball circles  -CN      Cueing 4 Demo  -CN      Reps 4 20 ea  -CN      Additional Comments L1 ball  -CN         Exercise 5    Exercise Name 5 Shoulder extension on blue foam with TA  -CN      Sets 5 2  -CN      Reps 5 10  -CN      Additional Comments RTB  -CN         Exercise 6    Exercise Name 6 PPT hip add iso ball squeeze bridge  -CN      Reps 6 15  -CN         Exercise 7    Exercise Name 7 hip abd ER TB HL  -CN      Sets 7 3  -CN      Reps 7 10  -CN      Additional Comments BTB, B and alt  -CN         Exercise 9    Exercise Name 9 Sidestepping at Tuff stuff with TA  -CN      Cueing 9 Demo  -CN      Reps 9 3 laps ea  -CN      Additional  Comments 3 bars  -CN         Exercise 12    Exercise Name 12 SL clamshells  -CN      Cueing 12 Verbal  -CN      Reps 12 2  -CN      Time 12 10  -CN         Exercise 13    Exercise Name 13 Quadruped alt LE lift with TA and bar  -CN      Cueing 13 Demo  -CN      Reps 13 10 ea  -CN         Exercise 14    Exercise Name 14 Monster walk in // bars  -CN      Cueing 14 Demo  -CN      Reps 14 3 laps  -CN      Additional Comments RTB  -CN         Exercise 19    Exercise Name 19 1 LE with toes down on opp side with ball bounce at rebounder  -CN      Cueing 19 Demo  -CN      Reps 19 20 throws ea  -CN         Exercise 20    Exercise Name 20 Prone press ups  -CN      Cueing 20 Demo  -CN      Reps 20 10  -CN        User Key  (r) = Recorded By, (t) = Taken By, (c) = Cosigned By    Initials Name Provider Type    CN Morena Dodge, PT Physical Therapist                               PT OP Goals     Row Name 06/13/18 1300          PT Short Term Goals    STG Date to Achieve 05/24/18  -CN     STG 1 Patient indepencent and compliant with intial HEP focused on core stabilization and flexibility/tendon glides  -CN     STG 1 Progress Met  -CN     STG 2 Patient able to perform TA/PPT with proper form in HL and seated/standing positions to improve posture and form with gym exercise program  -CN     STG 2 Progress Partially Met  -CN     STG 3 Patient report resolution of lumbar symptoms with improved ergonomic set up to allow for decreased risk of reinjury  -CN     STG 3 Progress Met  -CN     STG 4 Patient compliant with bilateral night resting splints to improve sleep and decreased pain in AM  -CN     STG 4 Progress Met  -CN        Long Term Goals    LTG Date to Achieve 06/21/18  -CN     LTG 1 Patient independent and compliant with advanced HEP and return to gym exercise program  -CN     LTG 1 Progress Progressing  -CN     LTG 1 Progress Comments Progressing exercises as tolerated.   -CN     LTG 2 Patient score less or equal 10% on  Oswestry LBP questionnaire for improved function  -CN     LTG 2 Progress Progressing  -CN     LTG 3 Patient with noted improved posture in standing and seated positions without verbal cues for decreased risk of reinjury and improved form with gym exercise program  -CN     LTG 3 Progress Progressing  -CN     LTG 4 Patient MMT bilateral hip flexors (SLR) in supine position 5/5 without compensation patterns for improved core stabilization thus improved balance/coordination  -CN     LTG 4 Progress Ongoing  -CN       User Key  (r) = Recorded By, (t) = Taken By, (c) = Cosigned By    Initials Name Provider Type    MICHELLE Dodge PT Physical Therapist          Therapy Education  Given: HEP, Symptoms/condition management, Pain management, Posture/body mechanics, Mobility training, Other (comment)  Program: Progressed  How Provided: Verbal, Demonstration  Provided to: Patient  Level of Understanding: Teach back education performed, Verbalized, Demonstrated              Time Calculation:   Start Time: 1015  Stop Time: 1100  Time Calculation (min): 45 min  Therapy Suggested Charges     Code   Minutes Charges    95717 (CPT®) Hc Pt Neuromusc Re Education Ea 15 Min 20 1    50009 (CPT®) Hc Pt Ther Proc Ea 15 Min 25 2    81322 (CPT®) Hc Gait Training Ea 15 Min      17574 (CPT®) Hc Pt Therapeutic Act Ea 15 Min      23304 (CPT®) Hc Pt Manual Therapy Ea 15 Min      70300 (CPT®) Hc Pt Ther Massage- Per 15 Min      05087 (CPT®) Hc Pt Iontophoresis Ea 15 Min      70845 (CPT®) Hc Pt Elec Stim Ea-Per 15 Min      55677 (CPT®) Hc Pt Ultrasound Ea 15 Min      61267 (CPT®) Hc Pt Self Care/Mgmt/Train Ea 15 Min      Total  45 3        Therapy Charges for Today     Code Description Service Date Service Provider Modifiers Qty    79912361239 HC PT NEUROMUSC RE EDUCATION EA 15 MIN 6/13/2018 Morena Dodge, PT GP 1    1994707 HC PT THER PROC EA 15 MIN 6/13/2018 Morena Dodge, PT GP 2                    Morena Young  Echo, PT  6/13/2018

## 2018-06-20 ENCOUNTER — HOSPITAL ENCOUNTER (OUTPATIENT)
Dept: PHYSICAL THERAPY | Facility: HOSPITAL | Age: 23
Setting detail: THERAPIES SERIES
Discharge: HOME | End: 2018-06-20

## 2018-06-20 DIAGNOSIS — R29.898 WEAKNESS OF BACK: ICD-10-CM

## 2018-06-20 DIAGNOSIS — R29.3 BAD POSTURE: Primary | ICD-10-CM

## 2018-06-20 DIAGNOSIS — R26.9 ABNORMAL GAIT: ICD-10-CM

## 2018-06-20 DIAGNOSIS — G56.03 BILATERAL CARPAL TUNNEL SYNDROME: ICD-10-CM

## 2018-06-20 DIAGNOSIS — Z86.79 HISTORY OF INTRACRANIAL HEMORRHAGE: ICD-10-CM

## 2018-06-20 DIAGNOSIS — R27.9 LACK OF COORDINATION: ICD-10-CM

## 2018-06-20 DIAGNOSIS — M54.50 ACUTE RIGHT-SIDED LOW BACK PAIN WITHOUT SCIATICA: ICD-10-CM

## 2018-06-20 DIAGNOSIS — R53.1 WEAKNESS GENERALIZED: ICD-10-CM

## 2018-06-20 PROCEDURE — 97112 NEUROMUSCULAR REEDUCATION: CPT

## 2018-06-20 PROCEDURE — 97110 THERAPEUTIC EXERCISES: CPT

## 2018-06-20 NOTE — THERAPY TREATMENT NOTE
Outpatient Physical Therapy Ortho Treatment Note  [facility]     Patient Name: Juli Luna  : 1995  MRN: [id number]  Today's Date: 2018      Visit Date: 2018    Visit Dx:    ICD-10-CM ICD-9-CM   1. Bad posture R29.3 781.92   2. History of intracranial hemorrhage Z86.79 V12.59   3. Bilateral carpal tunnel syndrome G56.03 354.0   4. Acute right-sided low back pain without sciatica M54.5 724.2   5. Weakness generalized R53.1 780.79   6. Weakness of back R29.898 724.8   7. Abnormal gait R26.9 781.2   8. 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;  Service:    • ENDOSCOPY W/ PEG TUBE PLACEMENT N/A 9/3/2016    Procedure: ESOPHAGOGASTRODUODENOSCOPY WITH PERCUTANEOUS ENDOSCOPIC GASTROSTOMY TUBE INSERTION;  Surgeon: René Ritter MD;  Location: [facility] ENDOSCOPY;  Service:    • TRACHEOSTOMY N/A 2016    Procedure: TRACHEOSTOMY;  Surgeon: Chad Negron MD;  Location: [facility];  Service:                              PT Assessment/Plan     Row Name 18 1351          PT Assessment    Assessment Comments Pt with increased pain after mopping yesterday. Discussed body mechanics and advised pt to perform weight shift vs twisting through lumbar spine. Continued with standing stability exercises and pt with difficutly with SLS and  rotational activities.   -CN        PT Plan    PT Plan Comments Continue with current stability and glute strengthening program.   -CN       User Key  (r) = Recorded By, (t) = Taken By, (c) = Cosigned By    Initials Name Provider Type    CN Morena Dodge, PT Physical Therapist                    Exercises     Row Name 06/20/18 1059 06/20/18 1000          Subjective Comments    Subjective Comments  -- I mopped the floor yesterday and did a few other chores around the house and it was hurting while I was doing them. It went away afterwards through. Pt arrived at 10:55 for 10:45 appointment.   -CN        Subjective Pain    Able to rate subjective pain?  -- yes  -CN     Pre-Treatment Pain Level  -- 0  -CN        Total Minutes    43837 - PT Therapeutic Exercise Minutes 20  -CN  --     00992 -  PT Neuromuscular Reeducation Minutes 15  -CN  --        Exercise 1    Exercise Name 1  -- nustep  -CN     Time 1  -- 5 minutes  -CN     Additional Comments  -- L5 with UEs  -CN        Exercise 2    Exercise Name 2  -- Narrow MARIA DEL CARMEN on blue foam with small ball circles  -CN     Cueing 2  -- Demo  -CN     Reps 2  -- 20 CW and CCW  -CN        Exercise 3    Exercise Name 3  -- Stagger stance with trunk rotation on upside down bosu  -CN     Cueing 3  -- Demo  -CN     Reps 3  -- 10 ea  -CN     Additional Comments  -- L1 ball  -CN        Exercise 10    Exercise Name 10  -- Press ups with PT overpressure  -CN     Cueing 10  -- Verbal  -CN     Reps 10  -- 10  -CN        Exercise 11    Exercise Name 11  -- Reverse clamshells  -CN     Cueing 11  -- Demo  -CN     Sets 11  -- 2  -CN     Reps 11  -- 10  -CN        Exercise 12    Exercise Name 12  -- SL clamshells  -CN     Cueing 12  -- Verbal  -CN     Reps 12  -- 2 sets of 10  -CN     Time 12  -- --  -CN     Additional Comments  -- RTB  -CN        Exercise 13    Exercise Name 13  -- Quadruped alt UE/LE lift with TA and bar  -CN     Cueing 13  -- Demo  -CN     Reps 13  -- 10 ea  -CN         Exercise 14    Exercise Name 14  -- Monster walk in // bars forward and retro  -CN     Cueing 14  -- Demo  -CN     Reps 14  -- 3 laps  -CN     Additional Comments  -- RTB  -CN        Exercise 15    Exercise Name 15  -- Tall kneeling with slight posterior lean with horizontal abduction  -CN     Cueing 15  -- Demo  -CN     Sets 15  -- 2  -CN     Reps 15  -- 5  -CN     Additional Comments  -- RTB, cueing to engage glutes  -CN        Exercise 19    Exercise Name 19  -- 1 LE with toes down on opp side with ball bounce at rebounder  -CN     Cueing 19  -- Demo  -CN     Reps 19  -- 20 throws ea  -CN        Exercise 20    Exercise Name 20  -- Prone press ups  -CN     Cueing 20  -- Demo  -CN     Reps 20  -- 10  -CN       User Key  (r) = Recorded By, (t) = Taken By, (c) = Cosigned By    Initials Name Provider Type    CN Morena Dodge, PT Physical Therapist                               PT OP Goals     Row Name 06/20/18 1200          PT Short Term Goals    STG Date to Achieve 05/24/18  -CN     STG 1 Patient indepencent and compliant with intial HEP focused on core stabilization and flexibility/tendon glides  -CN     STG 1 Progress Met  -CN     STG 2 Patient able to perform TA/PPT with proper form in HL and seated/standing positions to improve posture and form with gym exercise program  -CN     STG 2 Progress Partially Met  -CN     STG 3 Patient report resolution of lumbar symptoms with improved ergonomic set up to allow for decreased risk of reinjury  -CN     STG 3 Progress Met  -CN     STG 4 Patient compliant with bilateral night resting splints to improve sleep and decreased pain in AM  -CN     STG 4 Progress Met  -CN        Long Term Goals    LTG Date to Achieve 06/21/18  -CN     LTG 1 Patient independent and compliant with advanced HEP and return to gym exercise program  -CN     LTG 1 Progress Progressing  -CN     LTG 1 Progress Comments Compliant with current program, progressed today.   -CN     LTG 2 Patient  score less or equal 10% on Oswestry LBP questionnaire for improved function  -CN     LTG 2 Progress Progressing  -CN     LTG 3 Patient with noted improved posture in standing and seated positions without verbal cues for decreased risk of reinjury and improved form with gym exercise program  -CN     LTG 3 Progress Progressing  -CN     LTG 4 Patient MMT bilateral hip flexors (SLR) in supine position 5/5 without compensation patterns for improved core stabilization thus improved balance/coordination  -CN     LTG 4 Progress Ongoing  -CN       User Key  (r) = Recorded By, (t) = Taken By, (c) = Cosigned By    Initials Name Provider Type    MICHELLE Dodge PT Physical Therapist          Therapy Education  Given: HEP, Symptoms/condition management, Pain management, Posture/body mechanics, Mobility training, Other (comment)  Program: Progressed  How Provided: Verbal, Demonstration  Provided to: Patient  Level of Understanding: Teach back education performed, Verbalized, Demonstrated              Time Calculation:   Start Time: 1055  Stop Time: 1130  Time Calculation (min): 35 min  Therapy Suggested Charges     Code   Minutes Charges    26384 (CPT®) Hc Pt Neuromusc Re Education Ea 15 Min 15 1    89674 (CPT®) Hc Pt Ther Proc Ea 15 Min 20 1    32662 (CPT®) Hc Gait Training Ea 15 Min      60559 (CPT®) Hc Pt Therapeutic Act Ea 15 Min      76509 (CPT®) Hc Pt Manual Therapy Ea 15 Min      30167 (CPT®) Hc Pt Ther Massage- Per 15 Min      94598 (CPT®) Hc Pt Iontophoresis Ea 15 Min      85297 (CPT®) Hc Pt Elec Stim Ea-Per 15 Min      59995 (CPT®) Hc Pt Ultrasound Ea 15 Min      17262 (CPT®) Hc Pt Self Care/Mgmt/Train Ea 15 Min      Total  35 2        Therapy Charges for Today     Code Description Service Date Service Provider Modifiers Qty    41190379700 HC PT NEUROMUSC RE EDUCATION EA 15 MIN 6/20/2018 Morena Dodge, PT GP 1    98234224145 HC PT THER PROC EA 15 MIN 6/20/2018 Morena Dodge, PT GP 1                     Morena Dodge, PT  6/20/2018

## 2018-06-27 ENCOUNTER — APPOINTMENT (OUTPATIENT)
Dept: PHYSICAL THERAPY | Facility: HOSPITAL | Age: 23
End: 2018-06-27

## 2018-07-11 ENCOUNTER — APPOINTMENT (OUTPATIENT)
Dept: PHYSICAL THERAPY | Facility: HOSPITAL | Age: 23
End: 2018-07-11

## 2018-07-12 ENCOUNTER — HOSPITAL ENCOUNTER (OUTPATIENT)
Dept: PHYSICAL THERAPY | Facility: HOSPITAL | Age: 23
Setting detail: THERAPIES SERIES
Discharge: HOME | End: 2018-07-12

## 2018-07-12 DIAGNOSIS — Z86.79 HISTORY OF INTRACRANIAL HEMORRHAGE: ICD-10-CM

## 2018-07-12 DIAGNOSIS — G56.03 BILATERAL CARPAL TUNNEL SYNDROME: ICD-10-CM

## 2018-07-12 DIAGNOSIS — R26.9 ABNORMAL GAIT: ICD-10-CM

## 2018-07-12 DIAGNOSIS — M54.50 ACUTE RIGHT-SIDED LOW BACK PAIN WITHOUT SCIATICA: ICD-10-CM

## 2018-07-12 DIAGNOSIS — R53.1 WEAKNESS GENERALIZED: ICD-10-CM

## 2018-07-12 DIAGNOSIS — R27.9 LACK OF COORDINATION: ICD-10-CM

## 2018-07-12 DIAGNOSIS — R29.3 BAD POSTURE: Primary | ICD-10-CM

## 2018-07-12 DIAGNOSIS — R29.898 WEAKNESS OF BACK: ICD-10-CM

## 2018-07-12 PROCEDURE — 97112 NEUROMUSCULAR REEDUCATION: CPT

## 2018-07-12 PROCEDURE — 97110 THERAPEUTIC EXERCISES: CPT

## 2018-07-12 NOTE — THERAPY DISCHARGE NOTE
Outpatient Physical Therapy Ortho Treatment Note/Discharge Summary  [facility]     Patient Name: Juli Luna  : 1995  MRN: [id number]  Today's Date: 2018      Visit Date: 2018    Visit Dx:    ICD-10-CM ICD-9-CM   1. Bad posture R29.3 781.92   2. History of intracranial hemorrhage Z86.79 V12.59   3. Acute right-sided low back pain without sciatica M54.5 724.2   4. Bilateral carpal tunnel syndrome G56.03 354.0   5. Weakness generalized R53.1 780.79   6. Weakness of back R29.898 724.8   7. Abnormal gait R26.9 781.2   8. 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   • H/O craniotomy   • Episode of syncope   • Iron deficiency anemia due to chronic blood loss   • Dysphagia        Past Medical History:   Diagnosis Date   • AVM (arteriovenous malformation)    • History of pneumonia         Past Surgical History:   Procedure Laterality Date   • CRANIOTOMY FOR TUMOR Left 2016    Procedure: Posterior fossa suboccipital craniotomy and removal of brain stem arteriovenous malformation;  Surgeon: Josias Regalado MD;  Location: [facility];  Service:    • ENDOSCOPY W/ PEG TUBE PLACEMENT N/A 9/3/2016    Procedure: ESOPHAGOGASTRODUODENOSCOPY WITH PERCUTANEOUS ENDOSCOPIC GASTROSTOMY TUBE INSERTION;  Surgeon: René Ritter MD;  Location: [facility] ENDOSCOPY;  Service:    • TRACHEOSTOMY N/A 2016    Procedure: TRACHEOSTOMY;  Surgeon: Chad Negron MD;  Location: [facility];  Service:                                      Exercises     Row Name 18 1400             Subjective Comments    Subjective Comments It has been feeling good. I went on vacation last week and took one pain pill.   -CN         Subjective Pain    Able to rate subjective pain? yes  -CN      Pre-Treatment Pain Level 0  -CN          Total Minutes    30323 - PT Therapeutic Exercise Minutes 18  -CN      75381 -  PT Neuromuscular Reeducation Minutes 14  -CN         Exercise 1    Exercise Name 1 nustep  -CN      Time 1 5 minutes  -CN      Additional Comments L5 with UEs  -CN         Exercise 2    Exercise Name 2 Narrow MARIA DEL CARMEN on blue foam with small ball circles  -CN      Cueing 2 Demo  -CN      Reps 2 20 CW and CCW  -CN         Exercise 3    Exercise Name 3 Stagger stance with trunk rotation on upside down bosu  -CN      Cueing 3 Demo  -CN      Reps 3 10 ea  -CN      Additional Comments L1 ball  -CN         Exercise 10    Exercise Name 10 Press ups with PT overpressure  -CN      Cueing 10 Verbal  -CN      Reps 10 10  -CN         Exercise 11    Exercise Name 11 Reverse clamshells  -CN      Cueing 11 Demo  -CN      Sets 11 2  -CN      Reps 11 10  -CN         Exercise 12    Exercise Name 12 SL clamshells  -CN      Cueing 12 Verbal  -CN      Reps 12 2 sets of 10  -CN      Additional Comments RTB  -CN         Exercise 13    Exercise Name 13 Quadruped alt UE/LE lift with TA and bar  -CN      Cueing 13 Demo  -CN      Reps 13 10 ea  -CN         Exercise 14    Exercise Name 14 Monster walk in // bars forward and retro  -CN      Cueing 14 Demo  -CN      Reps 14 3 laps  -CN      Additional Comments RTB  -CN         Exercise 19    Exercise Name 19 1 LE with toes down on opp side with ball bounce at rebounder  -CN      Cueing 19 Demo  -CN      Reps 19 20 throws ea  -CN        User Key  (r) = Recorded By, (t) = Taken By, (c) = Cosigned By    Initials Name Provider Type    MICHELLE Dodge, PT Physical Therapist                               PT OP Goals     Row Name 07/12/18 1400          PT Short Term Goals    STG Date to Achieve 05/24/18  -CN     STG 1 Patient indepencent and compliant with intial HEP focused on core stabilization and flexibility/tendon glides  -CN     STG 1 Progress Met  -CN     STG 2 Patient able to perform TA/PPT with proper form in  HL and seated/standing positions to improve posture and form with gym exercise program  -CN     STG 2 Progress Met  -CN     STG 3 Patient report resolution of lumbar symptoms with improved ergonomic set up to allow for decreased risk of reinjury  -CN     STG 3 Progress Met  -CN     STG 4 Patient compliant with bilateral night resting splints to improve sleep and decreased pain in AM  -CN     STG 4 Progress Met  -CN        Long Term Goals    LTG Date to Achieve 06/21/18  -CN     LTG 1 Patient independent and compliant with advanced HEP and return to gym exercise program  -CN     LTG 1 Progress Met  -CN     LTG 2 Patient score less or equal 10% on Oswestry LBP questionnaire for improved function  -CN     LTG 2 Progress Met  -CN     LTG 2 Progress Comments Pt scored 4% on the Oswestry at this time.   -CN     LTG 3 Patient with noted improved posture in standing and seated positions without verbal cues for decreased risk of reinjury and improved form with gym exercise program  -CN     LTG 3 Progress Partially Met  -CN     LTG 3 Progress Comments Pt reports improved potural awareness overall.   -CN     LTG 4 Patient MMT bilateral hip flexors (SLR) in supine position 5/5 without compensation patterns for improved core stabilization thus improved balance/coordination  -CN     LTG 4 Progress Partially Met  -CN     LTG 4 Progress Comments Pt with improved hip flexor strength B, L greater than R.   -CN       User Key  (r) = Recorded By, (t) = Taken By, (c) = Cosigned By    Initials Name Provider Type    MICHELLE Dodge, PT Physical Therapist          Therapy Education  Given: HEP, Symptoms/condition management, Pain management, Posture/body mechanics, Mobility training, Other (comment)  Program: Progressed  How Provided: Verbal, Demonstration  Provided to: Patient  Level of Understanding: Teach back education performed, Verbalized, Demonstrated    Outcome Measure Options: Modifed Owestry  Modified Oswestry  Modified  Oswestry Score/Comments: 4% where 100% is no disability      Time Calculation:   Start Time: 1445  Stop Time: 1520  Time Calculation (min): 35 min  Therapy Suggested Charges     Code   Minutes Charges    25473 (CPT®) Hc Pt Neuromusc Re Education Ea 15 Min 14 1    27122 (CPT®) Hc Pt Ther Proc Ea 15 Min 18 1    21853 (CPT®) Hc Gait Training Ea 15 Min      30111 (CPT®) Hc Pt Therapeutic Act Ea 15 Min      70126 (CPT®) Hc Pt Manual Therapy Ea 15 Min      38146 (CPT®) Hc Pt Ther Massage- Per 15 Min      49804 (CPT®) Hc Pt Iontophoresis Ea 15 Min      75027 (CPT®) Hc Pt Elec Stim Ea-Per 15 Min      34183 (CPT®) Hc Pt Ultrasound Ea 15 Min      89890 (CPT®) Hc Pt Self Care/Mgmt/Train Ea 15 Min      Total  32 2        Therapy Charges for Today     Code Description Service Date Service Provider Modifiers Qty    38466321269 HC PT NEUROMUSC RE EDUCATION EA 15 MIN 7/12/2018 Morena Dodge, PT GP 1    48701159490 HC PT THER PROC EA 15 MIN 7/12/2018 Morena Dodge, PT GP 1          PT G-Codes  Outcome Measure Options: Modifed Owestry     OP PT Discharge Summary  Date of Discharge: 07/12/18  Outcomes Achieved: Patient able to partially acheive established goals  Discharge Destination: Home with home program  Discharge Instructions/Additional Comments: Pt has attended 10 skilled therapy sessions for treatment of low back pain, B hand pain and balance issues. Pt with improved pain levels overall and independent with current program. Reviewed exercises and reinforced importance of good posture with ADLs. Pt to D/C to independent management of symptoms today.       Morena Dodge, PT  7/12/2018

## 2018-07-30 ENCOUNTER — TELEPHONE (OUTPATIENT)
Dept: NEUROSURGERY | Facility: CLINIC | Age: 23
End: 2018-07-30

## 2018-07-30 DIAGNOSIS — Q28.2 ARTERIOVENOUS MALFORMATION OF BRAIN: Primary | ICD-10-CM

## 2018-07-30 NOTE — TELEPHONE ENCOUNTER
Due in September for eval of AVM with repeat MRI. New order in (older one ). Old records scanned, old imaging loaded. Letter sent.

## 2018-08-02 RX ORDER — DIAZEPAM 10 MG/1
10 TABLET ORAL ONCE AS NEEDED
Qty: 1 TABLET | Refills: 0 | OUTPATIENT
Start: 2018-08-02 | End: 2018-09-20

## 2018-08-09 ENCOUNTER — HOSPITAL ENCOUNTER (OUTPATIENT)
Dept: MRI IMAGING | Facility: HOSPITAL | Age: 23
Discharge: HOME | End: 2018-08-09
Admitting: NURSE PRACTITIONER

## 2018-08-09 DIAGNOSIS — Q28.2 ARTERIOVENOUS MALFORMATION OF BRAIN: ICD-10-CM

## 2018-08-09 PROCEDURE — 70553 MRI BRAIN STEM W/O & W/DYE: CPT

## 2018-08-09 PROCEDURE — A9577 INJ MULTIHANCE: HCPCS | Performed by: NURSE PRACTITIONER

## 2018-08-09 PROCEDURE — 0 GADOBENATE DIMEGLUMINE 529 MG/ML SOLUTION: Performed by: NURSE PRACTITIONER

## 2018-08-09 RX ADMIN — GADOBENATE DIMEGLUMINE 15 ML: 529 INJECTION, SOLUTION INTRAVENOUS at 16:48

## 2018-09-20 ENCOUNTER — OFFICE VISIT (OUTPATIENT)
Dept: NEUROSURGERY | Facility: CLINIC | Age: 23
End: 2018-09-20

## 2018-09-20 VITALS
WEIGHT: 171 LBS | RESPIRATION RATE: 18 BRPM | HEART RATE: 111 BPM | BODY MASS INDEX: 31.47 KG/M2 | HEIGHT: 62 IN | DIASTOLIC BLOOD PRESSURE: 54 MMHG | SYSTOLIC BLOOD PRESSURE: 96 MMHG

## 2018-09-20 DIAGNOSIS — Z86.79 HISTORY OF INTRACRANIAL HEMORRHAGE: ICD-10-CM

## 2018-09-20 DIAGNOSIS — Q28.2 ARTERIOVENOUS MALFORMATION OF BRAIN: Primary | ICD-10-CM

## 2018-09-20 DIAGNOSIS — Z98.890 H/O CRANIOTOMY: ICD-10-CM

## 2018-09-20 PROCEDURE — 99213 OFFICE O/P EST LOW 20 MIN: CPT | Performed by: NURSE PRACTITIONER

## 2018-09-20 RX ORDER — FEXOFENADINE HCL 180 MG/1
180 TABLET ORAL DAILY
COMMUNITY
End: 2021-01-11

## 2018-09-20 RX ORDER — LORATADINE 10 MG/1
CAPSULE, LIQUID FILLED ORAL
COMMUNITY
End: 2019-04-12

## 2018-09-20 NOTE — PROGRESS NOTES
"Subjective   Patient ID: Juli Luna is a 23 y.o. female is here today for follow-up AVM. Patient presents accompanied by her mother.     History of Present Illness     She returns to the office today for ongoing follow-up with history of an AVM.  She has had new brain imaging at [facility] on August 9, 2018.  She is status post posterior fossa suboccipital craniotomy and removal of brainstem AVM on September 7, 2016.  She was last seen in the office in June 2017 in imaging at that time showed stable findings.    Following the hemorrhage, she developed significant right-sided weakness and headaches long stay at [facility] acute rehabilitation.  She's been through aggressive physical therapy since and has regained most of her right-sided strength.  She still uses a right hand and arm brace at night.  Her handwriting is not what it used to be.  She still has ongoing balance and gait issues.  She states that she is no longer able to wear high heels or run as she is concerned about falling.    She is in school full-time at [employer]. She presents with her mother.          BP 96/54 (BP Location: Right arm, Patient Position: Sitting)   Pulse 111   Resp 18   Ht 156.2 cm (61.5\")   Wt 77.6 kg (171 lb)   BMI 31.79 kg/m²     The following portions of the patient's history were reviewed and updated as appropriate: allergies, current medications, past family history, past medical history, past social history, past surgical history and problem list.    Review of Systems   HENT: Negative for hearing loss and tinnitus.    Eyes: Negative for visual disturbance.   Musculoskeletal: Positive for gait problem (occ'l).   Neurological: Positive for speech difficulty (mumbles), weakness (BUE, R>L), numbness (BUE R>L) and headaches (occ'l). Negative for dizziness, tremors, seizures, syncope and light-headedness.   Psychiatric/Behavioral: Negative for confusion and decreased concentration.       Objective   Physical Exam   Constitutional: She is " oriented to person, place, and time. Vital signs are normal. She appears well-developed and well-nourished. She is cooperative.   Very pleasant well appearing young female   HENT:   Head: Normocephalic and atraumatic.   Neck: Neck supple.   Pulmonary/Chest: Effort normal.   Musculoskeletal: Normal range of motion. She exhibits no tenderness.   Strength equal bilateral upper extremities     Neurological: She is alert and oriented to person, place, and time. She has normal strength. She displays no tremor and normal reflexes. No cranial nerve deficit or sensory deficit. She exhibits normal muscle tone. Coordination and gait normal. GCS eye subscore is 4. GCS verbal subscore is 5. GCS motor subscore is 6.   Gait is stable and upright  Able to heel and toe walk, able to tandem   Skin: Skin is warm and dry.   Psychiatric: She has a normal mood and affect. Her behavior is normal. Thought content normal.   Vitals reviewed.    Neurologic Exam     Mental Status   Oriented to person, place, and time.     Motor Exam     Strength   Strength 5/5 throughout.       Assessment/Plan   Independent Review of Radiographic Studies:    MRI brain with and without contrast obtained at [facility] on August 9, 2018 reveals stable findings are compared to prior study in May 2017.  She has postop changes from the suboccipital craniectomy.  There are also no changes with regard to the signal abnormality in the left aspect of the medulla consistent with gliosis.  No new abnormalities identified.      Medical Decision Making:    She returns the office today for one-year follow-up with MRI for history of a cerebral AVM.  Exam as noted above, no red flags.  MRI imaging reveals stable findings with no sign of residual cavernous malformation.  No new abnormalities identified.  She continues to have remarkable transformation following the ruptured AVM.  She is regained full strength in her right upper and lower extremities.  However,  she is still working on fine motor skills and dexterity.  She is considering doing more physical therapy to help with balance as this is one of her biggest ongoing issues.  She denies any falls but states that she is very cautious to not put herself at increased risk.  From our standpoint she is clinically stable.  We will plan on seeing her back in 1 year with repeat MRI imaging.    Plan: Return to office in 1 year with MRI      Juli was seen today for avm.    Diagnoses and all orders for this visit:    Arteriovenous malformation of brain  -     MRI Brain With & Without Contrast; Future    H/O craniotomy  -     MRI Brain With & Without Contrast; Future    History of intracranial hemorrhage  -     MRI Brain With & Without Contrast; Future      Return in about 1 year (around 9/20/2019).

## 2019-03-04 ENCOUNTER — TELEPHONE (OUTPATIENT)
Dept: NEUROSURGERY | Facility: CLINIC | Age: 24
End: 2019-03-04

## 2019-03-04 DIAGNOSIS — Q28.2 ARTERIOVENOUS MALFORMATION OF BRAIN: Primary | ICD-10-CM

## 2019-03-04 NOTE — TELEPHONE ENCOUNTER
Pt called in requesting an order for PT, which Nata had told her she would order if Pt felt like she needs at it last visit 9/2018.      Callback: [phone]

## 2019-03-04 NOTE — TELEPHONE ENCOUNTER
Last seen by LB 9/20/18 for f/u s/p ruptured AVM.  Patient states that she was considering doing more PT to help with balance.

## 2019-03-21 ENCOUNTER — TREATMENT (OUTPATIENT)
Dept: PHYSICAL THERAPY | Facility: CLINIC | Age: 24
End: 2019-03-21

## 2019-03-21 DIAGNOSIS — R26.89 IMBALANCE: Primary | ICD-10-CM

## 2019-03-21 DIAGNOSIS — G56.01 CARPAL TUNNEL SYNDROME ON RIGHT: ICD-10-CM

## 2019-03-21 PROCEDURE — 97035 APP MDLTY 1+ULTRASOUND EA 15: CPT | Performed by: PHYSICAL THERAPIST

## 2019-03-21 PROCEDURE — 97161 PT EVAL LOW COMPLEX 20 MIN: CPT | Performed by: PHYSICAL THERAPIST

## 2019-03-21 PROCEDURE — 97110 THERAPEUTIC EXERCISES: CPT | Performed by: PHYSICAL THERAPIST

## 2019-03-21 NOTE — PROGRESS NOTES
Physical Therapy Initial Evaluation and Plan of Care    Patient Name: Juli Luna       Patient MRN: [id number]  : 1995  Physician:Referring, Self  Date: 3/21/2019    Encounter Diagnoses   Name Primary?   • Imbalance Yes   • Carpal tunnel syndrome on right        Subjective Evaluation    History of Present Illness  Onset date: 2018. AVM rupture.  Mechanism of injury: AVM rupture affecting R side. Problems with pain and numbness in R hand causing difficulty using R hand, dominant hand. Writing causes increased pain.     Also wants to work on balance and walking (will test next visit)  Has had balance rehab off and on after AVM rupture    Subjective comment: Quick Dash 25% disability  Patient Occupation: student, teller for [employer]  Pain  Current pain ratin  At best pain ratin  At worst pain ratin  Location: CT and radial palmar area  Quality: dull ache  Alleviating factors: bracing at night.  Aggravating factors: repetitive movement and keyboarding (writing)  Progression: no change    Social Support  Lives with: parents    Hand dominance: right    Treatments  Previous treatment: immobilization and physical therapy  Current treatment: immobilization  Patient Goals  Patient goals for therapy: decreased pain and increased strength           Objective       Observations     Right Wrist/Hand   Negative for edema.     Neurological Testing     Sensation     Wrist/Hand     Right   Diminished: light touch    Comments   Right light touch: in IF    Active Range of Motion     Right Wrist   Normal active range of motion    Additional Active Range of Motion Details  Full composite fist and full ext of all fingers. All tendons are grossly intact.     Strength/Myotome Testing     Left Wrist/Hand      (2nd hand position)   lbs: 43    Thumb Strength  Key/Lateral Pinch     Trial 1: 10 lbs  Tip/Two-Point Pinch     Trial 1: 5 lbs  Palmar/Three-Point Pinch     Trial 1: 8 lbs    Right Wrist/Hand       (2nd hand position)   lbs: 39    Thumb Strength   Key/Lateral Pinch     Trial 1: 9 lbs  Tip/Two-Point Pinch     Trial 1: 5 lbs  Palmar/Three-Point Pinch     Trial 1: 7 lbs    Tests     Right Wrist/Hand   Negative Phalen's sign and Tinel's sign (medial nerve).     Additional Tests Details  Carpal compression positive on R          Assessment & Plan     Assessment  Impairments: abnormal gait, abnormal or restricted ROM, activity intolerance, impaired balance, impaired physical strength, lacks appropriate home exercise program and pain with function  Other impairment: decreased sensation causing safety issue  Assessment details: Patient is a candidate for PT to address the listed impairments and functional limitations.   Prognosis: good  Functional Limitations: carrying objects, lifting, walking, pulling, pushing, uncomfortable because of pain and unable to perform repetitive tasks  Goals  Plan Goals: STG: To be met in 2-4 weeks:  1. Patient demonstrates independence/compliance with HEP to maintain gains made in PT.  2. Pain in R hand is less than 4/10 at worst.   3. Patient reports paresthesias in hand are improved by at least 30%.  4. Balance is evaluated and treatment is initiated.     LTG: To be met in 4-8 weeks:  1. Quick DASH score is less than 10% to demonstrate improved ability to perform ADLs.  2. Right  strength is at least 45 lbs to improve ability to , lift, push and pull.   3. R hand pain is less than 1/10 with writing for prolonged periods of time.   4. Sensation in R hand is normal.             Plan  Therapy options: will be seen for skilled physical therapy services  Planned modality interventions: cryotherapy, ultrasound and high voltage pulsed current (pain management)  Planned therapy interventions: home exercise program, manual therapy, joint mobilization, soft tissue mobilization, strengthening and stretching  Frequency: 2x week  Duration in weeks: 4  Treatment plan discussed with:  patient        See Exercise, Manual, and Modality Logs for complete treatment.     PROCEDURES AND MODALITIES:  Paraffin:   pre-rx  Moist Heat:    Ice:   post-rx  E-Stim:    Ultrasound: Rx Minutes: 7/9 w/setup  Ionto:   Traction:    Dry Needling:         Therapy Exercise 42091 10 minutes     Timed Code Treatment: 19 Minutes  Total Treatment Time: 45 Minutes    PT SIGNATURE: Esperanza Oneil PT, DPT, CHT  [place] License # [id number]  DATE TREATMENT INITIATED: 3/21/2019    Initial Certification  Certification Period: 6/19/2019  I certify that the therapy services are furnished while this patient is under my care.  The services outlined above are required by this patient, and will be reviewed every 90 days.     PHYSICIAN: Referring, Self      DATE:     Please sign and return via fax to [phone].. Thank you, [facility].

## 2019-03-28 ENCOUNTER — TREATMENT (OUTPATIENT)
Dept: PHYSICAL THERAPY | Facility: CLINIC | Age: 24
End: 2019-03-28

## 2019-03-28 DIAGNOSIS — R26.89 IMBALANCE: Primary | ICD-10-CM

## 2019-03-28 DIAGNOSIS — G56.01 CARPAL TUNNEL SYNDROME ON RIGHT: ICD-10-CM

## 2019-03-28 PROCEDURE — 97112 NEUROMUSCULAR REEDUCATION: CPT | Performed by: PHYSICAL THERAPIST

## 2019-03-28 PROCEDURE — 97035 APP MDLTY 1+ULTRASOUND EA 15: CPT | Performed by: PHYSICAL THERAPIST

## 2019-03-28 PROCEDURE — 97110 THERAPEUTIC EXERCISES: CPT | Performed by: PHYSICAL THERAPIST

## 2019-03-28 NOTE — PROGRESS NOTES
Vestibular Daily Progress Note    Visit#:2  Subjective   I did not wake up this morning with pain in my hand.   Objective      Computerized Dynamic Posturography  CDP: Sensory Organization Test  Composite Equilibrium Score: 49, 30% below normal score  Sensory Analysis WDL: Exceptions to WDL  Sensory Analysis: Vestibular(9)  Strategy Analysis: Ankle Dominant             PROCEDURES AND MODALITIES:  Paraffin:    Moist Heat:    Ice:    E-Stim:    Ultrasound: Rx Minutes: 7/9 w/setup  Ionto:   Traction:    See Exercise, Manual, and Modality Logs for complete treatment.   Therapy Exercise 47384 8 minutes and NMR [id number] 15 minutes       Timed Code Treatment: 32 Minutes  Total Treatment Time: 35 Minutes    Assessment/Plan   Pain in R hand is improved with HEP performance and wear of braces at night. SOT was performed today and patient presents with a vestibular sensory dysfunction. Educated on balance HEP.     Progress per Plan of Care    Esperanza Oneil, PT, DPT, CHT  Physical Therapist

## 2019-04-03 ENCOUNTER — TELEPHONE (OUTPATIENT)
Dept: NEUROSURGERY | Facility: CLINIC | Age: 24
End: 2019-04-03

## 2019-04-03 DIAGNOSIS — Q28.2 ARTERIOVENOUS MALFORMATION OF BRAIN: Primary | ICD-10-CM

## 2019-04-03 NOTE — TELEPHONE ENCOUNTER
Pt was last seen on 9/20 by SUHA for Arteriovenous malformation of brain.    Pt had Posterior fossa suboccipital craniotomy and removal of brain stem arteriovenous malformation surgery with [facility] 9/7/16.    Pts mother Marissa called today stating that her daughter has been having some difficulty seeing. This January pt went for her yearly check up with her eye dr and no rx seems to be strong enough to help her see. They have tried numerous pairs of contact lenses and nothing has helped. Hey eye glasses are not helping either.    Her eye dr seems stumped and mentioned the word nystagmus (meaning rapid eye movement)  pertaining to the pt and possibly being the problem.     Does she need to see a neurologist per mother or what does [facility] suggest?    [phone] Chrissy Ann

## 2019-04-03 NOTE — TELEPHONE ENCOUNTER
Marissa her mom (on WILMAR) states that she has been having blurred vision since January when she got new contacts.  Dr Angélica Washington (opthal) gave her a nystagmus DX.  They keep changing her contact RX with no resolve of SX.  She has no other symptoms.

## 2019-04-03 NOTE — TELEPHONE ENCOUNTER
With type of visual complaints is she having?  Is it blurring, double, loss is a constant or does it come and go?  Is she having any other symptoms with the visual disturbance?  How long has been going on?

## 2019-04-03 NOTE — TELEPHONE ENCOUNTER
Dr. Regalado would like patient to have new MRI brain and follow-up with him.  I have placed the order.  Please request records from Dr. Washington.

## 2019-04-04 RX ORDER — DIAZEPAM 5 MG/1
5 TABLET ORAL 2 TIMES DAILY PRN
Qty: 2 TABLET | Refills: 0 | OUTPATIENT
Start: 2019-04-04 | End: 2019-04-12

## 2019-04-05 ENCOUNTER — TREATMENT (OUTPATIENT)
Dept: PHYSICAL THERAPY | Facility: CLINIC | Age: 24
End: 2019-04-05

## 2019-04-05 DIAGNOSIS — G56.01 CARPAL TUNNEL SYNDROME ON RIGHT: ICD-10-CM

## 2019-04-05 DIAGNOSIS — R26.89 IMBALANCE: Primary | ICD-10-CM

## 2019-04-05 PROCEDURE — 97112 NEUROMUSCULAR REEDUCATION: CPT | Performed by: PHYSICAL THERAPIST

## 2019-04-05 PROCEDURE — 97035 APP MDLTY 1+ULTRASOUND EA 15: CPT | Performed by: PHYSICAL THERAPIST

## 2019-04-05 NOTE — PROGRESS NOTES
Physical Therapy Daily Progress Note    Visit #: 3  Juli Luna reports: I have not been doing my HEP. This has been a busy week. I did a lot of writing and my hand pain was better.   Pain Scale (0-10):     Subjective       Objective   See Exercise, Manual, and Modality Logs for complete treatment.     PROCEDURES AND MODALITIES:  Paraffin:   pre-rx  Moist Heat:    Ice:   post-rx  E-Stim:    Ultrasound: Rx Minutes: 7/9 w/setup  Ionto:   Traction:    Dry Needling:         Therapy Exercise [id number] 6 minutes and NMR [id number] 30 minutes     Timed Code Treatment: 45 Minutes  Total Treatment Time: 45 Minutes    Assessment/Plan  Patient has not been compliant with HEP. Pain in R hand is improved. Continues to exhibit a vestibular dysfunction pattern.     Progress per Plan of Care      Esperanza Oneil PT, DPT, CHT  Physical Therapist  [place] License # [id number]

## 2019-04-09 ENCOUNTER — TREATMENT (OUTPATIENT)
Dept: PHYSICAL THERAPY | Facility: CLINIC | Age: 24
End: 2019-04-09

## 2019-04-09 DIAGNOSIS — G56.01 CARPAL TUNNEL SYNDROME ON RIGHT: ICD-10-CM

## 2019-04-09 DIAGNOSIS — R26.89 IMBALANCE: Primary | ICD-10-CM

## 2019-04-09 PROCEDURE — 97035 APP MDLTY 1+ULTRASOUND EA 15: CPT | Performed by: PHYSICAL THERAPIST

## 2019-04-09 PROCEDURE — 97110 THERAPEUTIC EXERCISES: CPT | Performed by: PHYSICAL THERAPIST

## 2019-04-09 PROCEDURE — 97112 NEUROMUSCULAR REEDUCATION: CPT | Performed by: PHYSICAL THERAPIST

## 2019-04-09 NOTE — PROGRESS NOTES
Physical Therapy Daily Progress Note    Visit #: 4  Juli Luna reports: My R hand is not hurting in the morning now. My L hand is bothering me this morning. I have not been doing my HEP.   Pain Scale (0-10):     Subjective       Objective   See Exercise, Manual, and Modality Logs for complete treatment.     PROCEDURES AND MODALITIES:  Paraffin:   pre-rx  Moist Heat:    Ice:   post-rx  E-Stim:    Ultrasound: Rx Minutes: 7/9 w/setup  Ionto:   Traction:    Dry Needling:         Therapy Exercise [id number] 10 minutes and NMR [id number] 25 minutes     Timed Code Treatment: 44 Minutes  Total Treatment Time: 44 Minutes    Assessment/Plan  Patient is reporting continued improvement in CTS symptoms. She is less stable on R LE as that was the side affected in AVM rupture. Will focus on R LE stability/strength and core strength with balance exercises.     Progress strengthening /stabilization /functional activity      Esperanza Oneil PT, DPT, CHT  Physical Therapist  [place] License # [id number]

## 2019-04-11 ENCOUNTER — TREATMENT (OUTPATIENT)
Dept: PHYSICAL THERAPY | Facility: CLINIC | Age: 24
End: 2019-04-11

## 2019-04-11 DIAGNOSIS — G56.01 CARPAL TUNNEL SYNDROME ON RIGHT: ICD-10-CM

## 2019-04-11 DIAGNOSIS — R26.89 IMBALANCE: Primary | ICD-10-CM

## 2019-04-11 PROCEDURE — 97112 NEUROMUSCULAR REEDUCATION: CPT | Performed by: PHYSICAL THERAPIST

## 2019-04-11 PROCEDURE — 97035 APP MDLTY 1+ULTRASOUND EA 15: CPT | Performed by: PHYSICAL THERAPIST

## 2019-04-11 NOTE — PROGRESS NOTES
Physical Therapy Daily Progress Note    Visit #: 5  Juli Luna reports: My hand was bothering me some this morning. I did do my  Hand exercises yesterday.   Pain Scale (0-10):     Subjective       Objective   See Exercise, Manual, and Modality Logs for complete treatment.     PROCEDURES AND MODALITIES:  Paraffin:   pre-rx  Moist Heat:    Ice:   post-rx  E-Stim:    Ultrasound: Rx Minutes: 7/9 w/setup  Ionto:   Traction:    Dry Needling:         Therapy Exercise 85651 6 minutes and NMR [id number] 26 minutes     Timed Code Treatment: 41 Minutes  Total Treatment Time: 45 Minutes    Assessment/Plan  Patient is demonstrating improvement in performance on balance on wobble board. Patient is reporting increased compliance with CTS exercises.     Progress per Plan of Care      Esperanza Oneil PT, DPT, CHT  Physical Therapist  [place] License # [id number]

## 2019-04-12 ENCOUNTER — HOSPITAL ENCOUNTER (OUTPATIENT)
Dept: MRI IMAGING | Facility: HOSPITAL | Age: 24
Discharge: HOME | End: 2019-04-12
Admitting: NURSE PRACTITIONER

## 2019-04-12 ENCOUNTER — OFFICE VISIT (OUTPATIENT)
Dept: NEUROSURGERY | Facility: CLINIC | Age: 24
End: 2019-04-12

## 2019-04-12 VITALS
HEART RATE: 88 BPM | SYSTOLIC BLOOD PRESSURE: 122 MMHG | RESPIRATION RATE: 16 BRPM | DIASTOLIC BLOOD PRESSURE: 64 MMHG | HEIGHT: 61 IN | WEIGHT: 174 LBS | BODY MASS INDEX: 32.85 KG/M2

## 2019-04-12 DIAGNOSIS — Q28.2 ARTERIOVENOUS MALFORMATION OF BRAIN: ICD-10-CM

## 2019-04-12 DIAGNOSIS — Q28.2 ARTERIOVENOUS MALFORMATION OF BRAIN: Primary | ICD-10-CM

## 2019-04-12 PROCEDURE — A9577 INJ MULTIHANCE: HCPCS | Performed by: NURSE PRACTITIONER

## 2019-04-12 PROCEDURE — 99214 OFFICE O/P EST MOD 30 MIN: CPT | Performed by: NEUROLOGICAL SURGERY

## 2019-04-12 PROCEDURE — 0 GADOBENATE DIMEGLUMINE 529 MG/ML SOLUTION: Performed by: NURSE PRACTITIONER

## 2019-04-12 PROCEDURE — 70553 MRI BRAIN STEM W/O & W/DYE: CPT

## 2019-04-12 RX ADMIN — GADOBENATE DIMEGLUMINE 15 ML: 529 INJECTION, SOLUTION INTRAVENOUS at 07:10

## 2019-04-12 NOTE — PROGRESS NOTES
Subjective   Patient ID: Juli Luna is a 24 y.o. female is here today for follow-up for AVM, visual changes.  Pt is accompanied by her mother.    History of Present Illness  Patient presents with concerns of visual changes.  She states since January she has noticed some difficulty with focus especially when she is at school.  It occurs in both eyes equally.  It is not associated with double vision, nausea, imbalance, or headaches.  She has been to her eye doctor on several occasions and has had multiple prescription changes without significant benefit and visual difficulties.  She was last seen in September 2018 for follow-up of a history of AVM.  She is status post posterior fossa suboccipital craniotomy and removal of AVM in September 2016.  Her recent MRI in September was stable.  She has had new MRI for follow-up today. She continues doing PT for right side weakness.    The following portions of the patient's history were reviewed and updated as appropriate: allergies, current medications and problem list.    Review of Systems   Eyes: Positive for visual disturbance (contacts changed 8 x since January).        Hard to focus   Genitourinary: Negative for difficulty urinating and enuresis.   Neurological: Negative for dizziness, tremors, seizures, syncope, facial asymmetry, speech difficulty, weakness, light-headedness, numbness and headaches.   Psychiatric/Behavioral: Negative for confusion and decreased concentration.       Objective   Physical Exam   Constitutional: She is oriented to person, place, and time. She appears well-developed and well-nourished.   Short stature   Neck:   Healed prior tracheostomy site   Pulmonary/Chest: Effort normal.   Neurological: She is alert and oriented to person, place, and time. She has normal strength. She has an abnormal Tandem Gait Test. She has a normal Finger-Nose-Finger Test, a normal Heel to Villela Test and a normal Romberg Test. Gait normal.   Skin: Skin is warm and  dry.   Psychiatric: She has a normal mood and affect. Her behavior is normal. Judgment normal.   Vitals reviewed.    Neurologic Exam     Mental Status   Oriented to person, place, and time.   Level of consciousness: alert  Knowledge: good.   Normal comprehension.     Cranial Nerves   Cranial nerves II through XII intact.     Motor Exam   Right arm pronator drift: absent  Left arm pronator drift: absent    Strength   Strength 5/5 throughout.     Gait, Coordination, and Reflexes     Gait  Gait: normal    Coordination   Romberg: negative  Finger to nose coordination: normal  Heel to shin coordination: normal  Tandem walking coordination: abnormal      Assessment/Plan   Independent Review of Radiographic Studies:      I personally reviewed the MRI done today of the brain and compared it to prior MRIs back to 2016.  There is evidence of a postoperative bed within the pontomedullary junction.  There is no evidence of recurrence of arteriovenous malformation.  There is no evidence of acute hemorrhage.    Additionally the rest of the brain tissue appears to be normal.  There is no evidence of other cavernous malformations in the brain, tumors, stroke etc.      Medical Decision Making:    I confirmed and obtained the above history as recorded by the nurse practitioner acting as a scribe. I performed the above examination and it is documented by the nurse practitioner acting as a scribe.    The patient returns to the office with the above-noted history and physical findings.    She and I are both pleased with her progress.    I explained to her and her mom that I did not think that the presence of her cavernous malformation that was removed in 2016 has anything to do right now with her visual problems.    I suspect that her vision will improve with better correction of her eyes!.    From the medical standpoint I would like to see her back in 5 years with a new MRI.    Juli was seen today for avm, visual changes.    Diagnoses  and all orders for this visit:    Arteriovenous malformation of brain  -     MRI Brain With & Without Contrast; Future      Return in about 5 years (around 4/12/2024) for Follow up after testing.

## 2019-04-16 ENCOUNTER — TREATMENT (OUTPATIENT)
Dept: PHYSICAL THERAPY | Facility: CLINIC | Age: 24
End: 2019-04-16

## 2019-04-16 DIAGNOSIS — G56.01 CARPAL TUNNEL SYNDROME ON RIGHT: ICD-10-CM

## 2019-04-16 DIAGNOSIS — R26.89 IMBALANCE: Primary | ICD-10-CM

## 2019-04-16 PROCEDURE — 97035 APP MDLTY 1+ULTRASOUND EA 15: CPT | Performed by: PHYSICAL THERAPIST

## 2019-04-16 PROCEDURE — 97112 NEUROMUSCULAR REEDUCATION: CPT | Performed by: PHYSICAL THERAPIST

## 2019-04-16 PROCEDURE — 97110 THERAPEUTIC EXERCISES: CPT | Performed by: PHYSICAL THERAPIST

## 2019-04-16 NOTE — PROGRESS NOTES
Physical Therapy Daily Progress Note    Visit #: 6  Juli Luna reports: My hands only hurt from writing a paper. Hands feel better over all. I think I need more work on my balance.   Pain Scale (0-10):     Subjective       Objective   See Exercise, Manual, and Modality Logs for complete treatment.     PROCEDURES AND MODALITIES:  Paraffin:   pre-rx  Moist Heat:    Ice:   post-rx  E-Stim:    Ultrasound: Rx Minutes: 7/9 w/setup  Ionto:   Traction:    Dry Needling:         Therapy Exercise 01683 8 minutes and NMR [id number] 26 minutes     Timed Code Treatment: 43 Minutes  Total Treatment Time: 48 Minutes    Assessment/Plan  Patient demonstrates increased compliance with HEP. Continues to report improvement in hands. Needs further balance rehab and strengthening of the L LE.   Progress per Plan of Care      Esperanza Oneil PT, DPT, CHT  Physical Therapist  [place] License # [id number]

## 2019-04-18 ENCOUNTER — TREATMENT (OUTPATIENT)
Dept: PHYSICAL THERAPY | Facility: CLINIC | Age: 24
End: 2019-04-18

## 2019-04-18 DIAGNOSIS — G56.01 CARPAL TUNNEL SYNDROME ON RIGHT: ICD-10-CM

## 2019-04-18 DIAGNOSIS — R26.89 IMBALANCE: Primary | ICD-10-CM

## 2019-04-18 PROCEDURE — 97035 APP MDLTY 1+ULTRASOUND EA 15: CPT | Performed by: PHYSICAL THERAPIST

## 2019-04-18 PROCEDURE — 97112 NEUROMUSCULAR REEDUCATION: CPT | Performed by: PHYSICAL THERAPIST

## 2019-04-18 PROCEDURE — 97110 THERAPEUTIC EXERCISES: CPT | Performed by: PHYSICAL THERAPIST

## 2019-04-18 NOTE — PROGRESS NOTES
Physical Therapy Daily Progress Note    Visit #: 7  Juli Luna reports: My R hand hurt more in the past 2 days. The US usually helps but last time it was uncomfortable.   Pain Scale (0-10):     Subjective       Objective   See Exercise, Manual, and Modality Logs for complete treatment.     PROCEDURES AND MODALITIES:  Paraffin:   pre-rx  Moist Heat:    Ice:   post-rx  E-Stim:    Ultrasound: Rx Minutes: 7/9 w/setup  Ionto:   Traction:    Dry Needling:         Therapy Exercise 01289 10 minutes and NMR [id number] 25 minutes     Timed Code Treatment: 48 Minutes  Total Treatment Time: 50 Minutes    Assessment/Plan  Patient is demonstrating improvements in balance. She was able to maintain balance on foam longer with EC today. Will continue to focus on balance and strengthening of R side.     Progress strengthening /stabilization /functional activity      Esperanza Oneil PT, DPT, CHT  Physical Therapist  [place] License # [id number]

## 2019-04-23 ENCOUNTER — TREATMENT (OUTPATIENT)
Dept: PHYSICAL THERAPY | Facility: CLINIC | Age: 24
End: 2019-04-23

## 2019-04-23 DIAGNOSIS — G56.01 CARPAL TUNNEL SYNDROME ON RIGHT: ICD-10-CM

## 2019-04-23 DIAGNOSIS — R26.89 IMBALANCE: Primary | ICD-10-CM

## 2019-04-23 PROCEDURE — 97112 NEUROMUSCULAR REEDUCATION: CPT | Performed by: PHYSICAL THERAPIST

## 2019-04-23 PROCEDURE — 97110 THERAPEUTIC EXERCISES: CPT | Performed by: PHYSICAL THERAPIST

## 2019-04-25 ENCOUNTER — TREATMENT (OUTPATIENT)
Dept: PHYSICAL THERAPY | Facility: CLINIC | Age: 24
End: 2019-04-25

## 2019-04-25 DIAGNOSIS — G56.01 CARPAL TUNNEL SYNDROME ON RIGHT: ICD-10-CM

## 2019-04-25 DIAGNOSIS — R26.89 IMBALANCE: Primary | ICD-10-CM

## 2019-04-25 PROCEDURE — 97110 THERAPEUTIC EXERCISES: CPT | Performed by: PHYSICAL THERAPIST

## 2019-04-25 PROCEDURE — 97035 APP MDLTY 1+ULTRASOUND EA 15: CPT | Performed by: PHYSICAL THERAPIST

## 2019-04-25 PROCEDURE — 97112 NEUROMUSCULAR REEDUCATION: CPT | Performed by: PHYSICAL THERAPIST

## 2019-04-25 NOTE — PROGRESS NOTES
Re-Assessment / Re-Certification  Patient: Juli Luna   : 1995  Diagnosis/ICD-10 Code:  Imbalance [R26.89], B CTS  Referring practitioner: Self Referring  Date of Initial Visit: Episode Type: THERAPY  Noted: 3/21/2019  Today's Date: 2019  Visit #: 9  Subjective:   Juli Luna reports: My hand pain is improved. I am able to write for test taking with less pain during and afterward. Before would take an exam at school and my hands would hurt for days after. Now they only hurt for a day. Numbness and tingling is no longer constant. R hand is still worse than the left. I can tell I don't feel thing as well in my R hand. I can feel things fine with my L hand. I feel like I am able to correct my balance better now. I still get off balance but my reaction is better.   Subjective Questionnaire: QuickDASH: 25% disability    Clinical Progress: improved  Home Program Compliance: Yes  Treatment has included: therapeutic exercise, neuromuscular re-education, ultrasound and HEP    Subjective Evaluation    Pain  At worst pain rating: 3 (R>L after doing a lot of writing)         Objective       Static Posture     Comments  BALANCE TESTING: CDP: SOT Composite Equilibrium Score: 56, 20% below normal score  Sensory Analysis: decreased use of vestibular and visual preference. (Vestibular function improved when compared to last test approx. 1 month ago.)      Neurological Testing     Sensation     Wrist/Hand   Left   Diminished: light touch    Right   Intact: light touch    Comments   Left light touch: in median nerve dist     Strength/Myotome Testing     Left Wrist/Hand      (2nd hand position)   lbs: 43    Thumb Strength  Key/Lateral Pinch     Trial 1: 11 lbs  Tip/Two-Point Pinch     Trial 1: 6 lbs  Palmar/Three-Point Pinch     Trial 1: 10 lbs    Right Wrist/Hand      (2nd hand position)   lbs: 38    Thumb Strength   Key/Lateral Pinch     Trial 1: 9 lbs  Tip/Two-Point Pinch     Trial 1: 5 lbs  Palmar/Three-Point  Pinch     Trial 1: 9 lbs    Tests     Left Wrist/Hand   Negative Phalen's sign.     Right Wrist/Hand   Positive Phalen's sign and Tinel's sign (medial nerve).      Assessment & Plan     Assessment  Impairments: activity intolerance, impaired balance, impaired physical strength and pain with function  Assessment details:  strength is unchanged but pinch strengths are improved bilaterally. Pain in hand, augie R, is improved. She continues to have pain but only after a lot of writing, and does not last as long.   Balance is improved. She is demonstrating improved performance on balance tasks in clinic and on CDP testing. She is still not WNL for her age so further PT is indicated. She agrees.   Prognosis: good    Plan  Therapy options: will be seen for skilled physical therapy services  Planned modality interventions: ultrasound  Planned therapy interventions: stretching, strengthening, therapeutic activities, home exercise program, manual therapy, neuromuscular re-education and abdominal trunk stabilization  Frequency: 2x week  Duration in weeks: 4  Treatment plan discussed with: patient      Progress toward previous goals: Partially Met   Goals  Plan Goals: STG: To be met in 2-4 weeks:  1. Patient demonstrates independence/compliance with HEP to maintain gains made in PT. MET  2. Pain in R hand is less than 4/10 at worst. MET  3. Patient reports paresthesias in hand are improved by at least 30%. MET  4. Balance is evaluated and treatment is initiated. MET    LTG: To be met in 4-8 weeks: (all not met)  1. Quick DASH score is less than 10% to demonstrate improved ability to perform ADLs.  2. Right  strength is at least 45 lbs to improve ability to , lift, push and pull.   3. R hand pain is less than 1/10 with writing for prolonged periods of time.   4. Sensation in R hand is normal.    Recommendations: Continue as planned    PT Signature: Esperanza Oneil, PT, DPT, CHT  [place] License # [id number]    Based upon review  of the patient's progress and continued therapy plan, it is my medical opinion that Juli Luna should continue physical therapy treatment at [facility]  [address] [facility], [address] [address] [phone].    Signature: __________________________________  Referring, Self    See Exercise, Manual, and Modality Logs for complete treatment.     PROCEDURES AND MODALITIES:  Paraffin:   pre-rx  Moist Heat:    Ice:   post-rx  E-Stim:    Ultrasound: Rx Minutes: 7/9 w/setup  Ionto:   Traction:    Dry Needling:         Therapy Exercise [id number] 15 minutes and NMR [id number] 15 minutes     Timed Code Treatment: 39 Minutes  Total Treatment Time: 40 Minutes

## 2019-04-30 ENCOUNTER — TREATMENT (OUTPATIENT)
Dept: PHYSICAL THERAPY | Facility: CLINIC | Age: 24
End: 2019-04-30

## 2019-04-30 DIAGNOSIS — G56.01 CARPAL TUNNEL SYNDROME ON RIGHT: ICD-10-CM

## 2019-04-30 DIAGNOSIS — R26.89 IMBALANCE: Primary | ICD-10-CM

## 2019-04-30 PROCEDURE — 97035 APP MDLTY 1+ULTRASOUND EA 15: CPT | Performed by: PHYSICAL THERAPIST

## 2019-04-30 PROCEDURE — 97112 NEUROMUSCULAR REEDUCATION: CPT | Performed by: PHYSICAL THERAPIST

## 2019-04-30 PROCEDURE — 97110 THERAPEUTIC EXERCISES: CPT | Performed by: PHYSICAL THERAPIST

## 2019-04-30 NOTE — PROGRESS NOTES
Physical Therapy Daily Progress Note    Visit #: 10  Juli Luna reports: R hand felt fine on Friday after Thur appt. Hand hurt a lot more on Sat.   Pain Scale (0-10):     Subjective       Objective   See Exercise, Manual, and Modality Logs for complete treatment.     PROCEDURES AND MODALITIES:  Paraffin:   pre-rx  Moist Heat:    Ice:   post-rx  E-Stim:    Ultrasound: Rx Minutes: 7/9 w/setup  Ionto:   Traction:    Dry Needling:         Therapy Exercise [id number] 14 minutes and NMR [id number] 21 minutes     Timed Code Treatment: 44 Minutes  Total Treatment Time: 47 Minutes    Assessment/Plan  Unsure if PT caused increased hand pain as we did not add anything new and had good tolerance to strengthening the visit prior. If CT is mod to severe PT will not help with CTS symptoms and she is most likely a surgical candidate.     Progress strengthening /stabilization /functional activity      Esperanza Oneil PT, DPT, CHT  Physical Therapist  [place] License # [id number]

## 2019-05-02 ENCOUNTER — TREATMENT (OUTPATIENT)
Dept: PHYSICAL THERAPY | Facility: CLINIC | Age: 24
End: 2019-05-02

## 2019-05-02 DIAGNOSIS — G56.01 CARPAL TUNNEL SYNDROME ON RIGHT: ICD-10-CM

## 2019-05-02 DIAGNOSIS — R26.89 IMBALANCE: Primary | ICD-10-CM

## 2019-05-02 PROCEDURE — 97110 THERAPEUTIC EXERCISES: CPT | Performed by: PHYSICAL THERAPIST

## 2019-05-02 PROCEDURE — 97035 APP MDLTY 1+ULTRASOUND EA 15: CPT | Performed by: PHYSICAL THERAPIST

## 2019-05-02 PROCEDURE — 97112 NEUROMUSCULAR REEDUCATION: CPT | Performed by: PHYSICAL THERAPIST

## 2019-05-02 NOTE — PROGRESS NOTES
Physical Therapy Daily Progress Note    Visit #: 11  Juli Luna reports: My R hand only hurt for about 20 mins after last treatment. So I don't think it was the strengthening exercises that caused pain before, I think it was from writing so much.   Pain Scale (0-10):     Subjective       Objective   See Exercise, Manual, and Modality Logs for complete treatment.     PROCEDURES AND MODALITIES:  Paraffin:   pre-rx  Moist Heat:    Ice:   post-rx  E-Stim:    Ultrasound: Rx Minutes: 7/9 w/setup  Ionto:   Traction:    Dry Needling:         Therapy Exercise 57834 14 minutes and NMR 79051 21 minutes     Timed Code Treatment: 44 Minutes  Total Treatment Time: 45 Minutes    Assessment/Plan  Patient is demonstrating improvement in pain in R hand and improvements in balance. She continues to have a considerable amount of weakness in her R LE and core. Her R SL balance is poor. SL balance is fair on the L.     Progress strengthening /stabilization /functional activity Progress  strengthening as tolerated.       Esperanza Oneil, PT, DPT, CHT  Physical Therapist  [place] License # [id number]

## 2019-05-07 ENCOUNTER — TREATMENT (OUTPATIENT)
Dept: PHYSICAL THERAPY | Facility: CLINIC | Age: 24
End: 2019-05-07

## 2019-05-07 DIAGNOSIS — G56.01 CARPAL TUNNEL SYNDROME ON RIGHT: ICD-10-CM

## 2019-05-07 DIAGNOSIS — R26.89 IMBALANCE: Primary | ICD-10-CM

## 2019-05-07 PROCEDURE — 97110 THERAPEUTIC EXERCISES: CPT | Performed by: PHYSICAL THERAPIST

## 2019-05-07 PROCEDURE — 97112 NEUROMUSCULAR REEDUCATION: CPT | Performed by: PHYSICAL THERAPIST

## 2019-05-07 PROCEDURE — 97035 APP MDLTY 1+ULTRASOUND EA 15: CPT | Performed by: PHYSICAL THERAPIST

## 2019-05-07 NOTE — PROGRESS NOTES
Physical Therapy Daily Progress Note    Visit #: 12  Juli Luna reports: My  Hand has been hurting off and on over the weekend.   Pain Scale (0-10):     Subjective       Objective   See Exercise, Manual, and Modality Logs for complete treatment.     PROCEDURES AND MODALITIES:  Paraffin:   pre-rx  Moist Heat:    Ice:   post-rx  E-Stim:    Ultrasound: Rx Minutes: 7/9 w/setup  Ionto:   Traction:    Dry Needling:         Therapy Exercise 75206 17 minutes and NMR 59334 15 minutes     Timed Code Treatment: 41 Minutes  Total Treatment Time: 41 Minutes    Assessment/Plan  Putty exercises deferred today due to increased hand pain after last visit. Patient is demonstrating improved balance on certain balance tasks. L SL balance is still poor.     Progress per Plan of Care      Esperanza Oneil, PT, DPT, CHT  Physical Therapist  [place] License # [id number]

## 2019-05-14 ENCOUNTER — TREATMENT (OUTPATIENT)
Dept: PHYSICAL THERAPY | Facility: CLINIC | Age: 24
End: 2019-05-14

## 2019-05-14 DIAGNOSIS — R26.89 IMBALANCE: Primary | ICD-10-CM

## 2019-05-14 DIAGNOSIS — G56.01 CARPAL TUNNEL SYNDROME ON RIGHT: ICD-10-CM

## 2019-05-14 PROCEDURE — 97110 THERAPEUTIC EXERCISES: CPT | Performed by: PHYSICAL THERAPIST

## 2019-05-14 PROCEDURE — 97035 APP MDLTY 1+ULTRASOUND EA 15: CPT | Performed by: PHYSICAL THERAPIST

## 2019-05-14 PROCEDURE — 97112 NEUROMUSCULAR REEDUCATION: CPT | Performed by: PHYSICAL THERAPIST

## 2019-05-14 NOTE — PROGRESS NOTES
Physical Therapy Daily Progress Note    Visit #: 13  Juli Luna reports: Hands have felt ok since last visit. Notices trouble with fine motor activity at end of day.   Pain Scale (0-10):     Subjective       Objective   See Exercise, Manual, and Modality Logs for complete treatment.     PROCEDURES AND MODALITIES:  Paraffin:   pre-rx  Moist Heat:    Ice:   post-rx  E-Stim:    Ultrasound: Rx Minutes: 7/9 w/setup  Ionto:   Traction:    Dry Needling:         Therapy Exercise 58576 10 minutes and NMR 27117 23 minutes     Timed Code Treatment: 42 Minutes  Total Treatment Time: 45 Minutes    Assessment/Plan  Strengthening causes increased pain in hands thus strengthening was deferred. Overall pain is improved. Needs to continue balance rehab which will most likely be lifelong for her.     Progress strengthening /stabilization /functional activity      Esperanza Oneil PT, DPT, CHT  Physical Therapist  [place] License # [id number]

## 2019-05-16 ENCOUNTER — TREATMENT (OUTPATIENT)
Dept: PHYSICAL THERAPY | Facility: CLINIC | Age: 24
End: 2019-05-16

## 2019-05-16 DIAGNOSIS — G56.01 CARPAL TUNNEL SYNDROME ON RIGHT: ICD-10-CM

## 2019-05-16 DIAGNOSIS — R26.89 IMBALANCE: Primary | ICD-10-CM

## 2019-05-16 PROCEDURE — 97112 NEUROMUSCULAR REEDUCATION: CPT | Performed by: PHYSICAL THERAPIST

## 2019-05-16 PROCEDURE — 97035 APP MDLTY 1+ULTRASOUND EA 15: CPT | Performed by: PHYSICAL THERAPIST

## 2019-05-16 PROCEDURE — 97110 THERAPEUTIC EXERCISES: CPT | Performed by: PHYSICAL THERAPIST

## 2019-05-16 NOTE — PROGRESS NOTES
Physical Therapy Daily Progress Note    Visit #: 14  Juli Luna reports: I have noticed I can recover my balance better. I can run better, not that I run much. Like when I am chasing after my dog. My hands have been feeling ok.   Pain Scale (0-10):     Subjective       Objective   See Exercise, Manual, and Modality Logs for complete treatment.     PROCEDURES AND MODALITIES:  Paraffin:   pre-rx  Moist Heat:    Ice:   post-rx  E-Stim:    Ultrasound: Rx Minutes: 7/9 w/setup  Ionto:   Traction:    Dry Needling:         Therapy Exercise 80079 10 minutes and NMR 15695 30 minutes     Timed Code Treatment: 49 Minutes  Total Treatment Time: 55 Minutes    Assessment/Plan  Deferring  strengthening has helped with hand pain. Patient is demonstrating improved performance on balance tasks in clinic and is now reporting improvement in function with ADLs. R LE remains weaker than the L. Continues to have poor SL balance bilaterally R>L.     Progress strengthening /stabilization /functional activity      Esperanza Oneil PT, DPT, CHT  Physical Therapist  [place] License # [id number]

## 2019-05-23 ENCOUNTER — TREATMENT (OUTPATIENT)
Dept: PHYSICAL THERAPY | Facility: CLINIC | Age: 24
End: 2019-05-23

## 2019-05-23 DIAGNOSIS — G56.01 CARPAL TUNNEL SYNDROME ON RIGHT: ICD-10-CM

## 2019-05-23 DIAGNOSIS — R26.89 IMBALANCE: Primary | ICD-10-CM

## 2019-05-23 PROCEDURE — 97110 THERAPEUTIC EXERCISES: CPT | Performed by: PHYSICAL THERAPIST

## 2019-05-23 PROCEDURE — 97035 APP MDLTY 1+ULTRASOUND EA 15: CPT | Performed by: PHYSICAL THERAPIST

## 2019-05-23 PROCEDURE — 97112 NEUROMUSCULAR REEDUCATION: CPT | Performed by: PHYSICAL THERAPIST

## 2019-05-23 NOTE — PROGRESS NOTES
Physical Therapy Daily Progress Note    Visit #: 15  Juli Luna reports: I have not had any pain in my hands since last visit.   Pain Scale (0-10):     Subjective       Objective   See Exercise, Manual, and Modality Logs for complete treatment.     PROCEDURES AND MODALITIES:  Paraffin:   pre-rx  Moist Heat:    Ice:   post-rx  E-Stim:    Ultrasound: Rx Minutes: 7/9 w/setup  Ionto:   Traction:    Dry Needling:         Therapy Exercise [id number] 17 minutes and NMR 74156 16 minutes     Timed Code Treatment: 42 Minutes  Total Treatment Time: 45 Minutes    Assessment/Plan  Patient demonstrated observable improvement in balance with all tasks today. R SL balance remains poor but her tandem stance balance is improved. She continues to make progress in terms of stability. Hand pain has improved as well but she does not tolerate strengthening of R hand without increased CTS symptoms.     Progress per Plan of Care      Esperanza Oneil PT, DPT, CHT  Physical Therapist  [place] License # [id number]

## 2019-05-30 ENCOUNTER — TREATMENT (OUTPATIENT)
Dept: PHYSICAL THERAPY | Facility: CLINIC | Age: 24
End: 2019-05-30

## 2019-05-30 DIAGNOSIS — R26.89 IMBALANCE: Primary | ICD-10-CM

## 2019-05-30 DIAGNOSIS — G56.01 CARPAL TUNNEL SYNDROME ON RIGHT: ICD-10-CM

## 2019-05-30 PROCEDURE — 97112 NEUROMUSCULAR REEDUCATION: CPT | Performed by: PHYSICAL THERAPIST

## 2019-05-30 PROCEDURE — 97110 THERAPEUTIC EXERCISES: CPT | Performed by: PHYSICAL THERAPIST

## 2019-05-30 PROCEDURE — 97035 APP MDLTY 1+ULTRASOUND EA 15: CPT | Performed by: PHYSICAL THERAPIST

## 2019-06-11 ENCOUNTER — TREATMENT (OUTPATIENT)
Dept: PHYSICAL THERAPY | Facility: CLINIC | Age: 24
End: 2019-06-11

## 2019-06-11 DIAGNOSIS — G56.01 CARPAL TUNNEL SYNDROME ON RIGHT: ICD-10-CM

## 2019-06-11 DIAGNOSIS — R26.89 IMBALANCE: Primary | ICD-10-CM

## 2019-06-11 PROCEDURE — 97112 NEUROMUSCULAR REEDUCATION: CPT | Performed by: PHYSICAL THERAPIST

## 2019-06-11 PROCEDURE — 97110 THERAPEUTIC EXERCISES: CPT | Performed by: PHYSICAL THERAPIST

## 2019-06-11 NOTE — PROGRESS NOTES
Physical Therapy Daily Progress Note    Visit #: 17  Juli Luna reports: I had bronchitis last week. My hands have been feeling ok because I have not been writing much.   Pain Scale (0-10):     Subjective       Objective   See Exercise, Manual, and Modality Logs for complete treatment.     PROCEDURES AND MODALITIES:  Paraffin:   pre-rx  Moist Heat:    Ice:   post-rx  E-Stim:    Ultrasound:    Ionto:   Traction:    Dry Needling:         Therapy Exercise 57959 17 minutes and NMR 27003 17 minutes     Timed Code Treatment: 34 Minutes  Total Treatment Time: 35 Minutes    Assessment/Plan  Patient is demonstrating improvement in strength but continues to be weaker on her R side. Making steady progression of improvement in balance. Improvement in hand pain most likely due to decreased activity.      Progress strengthening /stabilization /functional activity      Esperanza Oneil PT, DPT, CHT  Physical Therapist  [place] License # [id number]

## 2019-06-13 ENCOUNTER — TREATMENT (OUTPATIENT)
Dept: PHYSICAL THERAPY | Facility: CLINIC | Age: 24
End: 2019-06-13

## 2019-06-13 DIAGNOSIS — G56.01 CARPAL TUNNEL SYNDROME ON RIGHT: ICD-10-CM

## 2019-06-13 DIAGNOSIS — R26.89 IMBALANCE: Primary | ICD-10-CM

## 2019-06-13 PROCEDURE — 97110 THERAPEUTIC EXERCISES: CPT | Performed by: PHYSICAL THERAPIST

## 2019-06-13 PROCEDURE — 97112 NEUROMUSCULAR REEDUCATION: CPT | Performed by: PHYSICAL THERAPIST

## 2019-06-13 NOTE — PROGRESS NOTES
Physical Therapy Daily Progress Note    Visit #: 18  Juli Luna reports: No new complaints.   Pain Scale (0-10):     Subjective       Objective   See Exercise, Manual, and Modality Logs for complete treatment.   R SL balance firm: 4 sec  L SL balance firm: 20 sec    PROCEDURES AND MODALITIES:  Paraffin:   pre-rx  Moist Heat:    Ice:   post-rx  E-Stim:    Ultrasound:    Ionto:   Traction:    Dry Needling:         Therapy Exercise 51564 8 minutes and NMR 33072 32 minutes     Timed Code Treatment: 40 Minutes  Total Treatment Time: 40 Minutes    Assessment/Plan  L SL balance is improved. R LE remains weaker and less stable. She demonstrated improved balance with INDO board and SL balance tasks.     Progress per Plan of Care      Esperanza Oneil, PT, DPT, CHT  Physical Therapist  [place] License # [id number]

## 2019-06-18 ENCOUNTER — TREATMENT (OUTPATIENT)
Dept: PHYSICAL THERAPY | Facility: CLINIC | Age: 24
End: 2019-06-18

## 2019-06-18 DIAGNOSIS — G56.01 CARPAL TUNNEL SYNDROME ON RIGHT: ICD-10-CM

## 2019-06-18 DIAGNOSIS — R26.89 IMBALANCE: Primary | ICD-10-CM

## 2019-06-18 PROCEDURE — 97110 THERAPEUTIC EXERCISES: CPT | Performed by: PHYSICAL THERAPIST

## 2019-06-18 PROCEDURE — 97112 NEUROMUSCULAR REEDUCATION: CPT | Performed by: PHYSICAL THERAPIST

## 2019-06-18 NOTE — PROGRESS NOTES
Physical Therapy Daily Progress Note    Visit #: 19  Juli Luna reports: hands are doing ok. I have not noticed that we haven't been doing the US. Balance is getting better. When I am thrown off balance I can recover before I fall.   Pain Scale (0-10):     Subjective       Objective   See Exercise, Manual, and Modality Logs for complete treatment.     PROCEDURES AND MODALITIES:  Paraffin:   pre-rx  Moist Heat:    Ice:   post-rx  E-Stim:    Ultrasound:    Ionto:   Traction:    Dry Needling:         Therapy Exercise [id number] 9 minutes and NMR 85229 29 minutes     Timed Code Treatment: 38 Minutes  Total Treatment Time: 38 Minutes    Assessment/Plan  Pt is demonstrating improved balance on BOSU. She no longer loses balance posteriorly. Her tandem amb balance is also improved.  Also, she has noticed no difference in hand not doing the US.     Progress per Plan of Care      Esperanza Oneil, PT, DPT, CHT  Physical Therapist  [place] License # [id number]

## 2019-06-20 ENCOUNTER — TREATMENT (OUTPATIENT)
Dept: PHYSICAL THERAPY | Facility: CLINIC | Age: 24
End: 2019-06-20

## 2019-06-20 DIAGNOSIS — R26.89 IMBALANCE: Primary | ICD-10-CM

## 2019-06-20 DIAGNOSIS — G56.01 CARPAL TUNNEL SYNDROME ON RIGHT: ICD-10-CM

## 2019-06-20 PROCEDURE — 97112 NEUROMUSCULAR REEDUCATION: CPT | Performed by: PHYSICAL THERAPIST

## 2019-06-20 PROCEDURE — 97110 THERAPEUTIC EXERCISES: CPT | Performed by: PHYSICAL THERAPIST

## 2019-06-20 NOTE — PROGRESS NOTES
Physical Therapy Daily Progress Note    Visit #: 11  Juli Luna reports: No new complaints.   Pain Scale (0-10):     Subjective       Objective   See Exercise, Manual, and Modality Logs for complete treatment.     PROCEDURES AND MODALITIES:  Paraffin:   pre-rx  Moist Heat:    Ice:   post-rx  E-Stim:    Ultrasound:    Ionto:   Traction:    Dry Needling:         Therapy Exercise 64231 9 minutes and NMR 92309 29 minutes     Timed Code Treatment: 38 Minutes  Total Treatment Time: 38* Minutes    Assessment/Plan  Performance with SL cone taps improved today. No loss of balance with standing on L LE. She is progressing with standing on the R LE but this remains difficult.     Progress per Plan of Care      Esperanza Oneil, PT, DPT, CHT  Physical Therapist  [place] License # [id number]

## 2019-06-25 ENCOUNTER — TREATMENT (OUTPATIENT)
Dept: PHYSICAL THERAPY | Facility: CLINIC | Age: 24
End: 2019-06-25

## 2019-06-25 DIAGNOSIS — G56.01 CARPAL TUNNEL SYNDROME ON RIGHT: ICD-10-CM

## 2019-06-25 DIAGNOSIS — R26.89 IMBALANCE: Primary | ICD-10-CM

## 2019-06-25 DIAGNOSIS — Z86.79 HISTORY OF INTRACRANIAL HEMORRHAGE: ICD-10-CM

## 2019-06-25 PROCEDURE — 97110 THERAPEUTIC EXERCISES: CPT | Performed by: PHYSICAL THERAPIST

## 2019-06-25 PROCEDURE — 97112 NEUROMUSCULAR REEDUCATION: CPT | Performed by: PHYSICAL THERAPIST

## 2019-06-25 NOTE — PROGRESS NOTES
Physical Therapy Daily Progress Note    Visit #: 20  Juli Luna reports: No new complaints.  Pain Scale (0-10):     Subjective       Objective   See Exercise, Manual, and Modality Logs for complete treatment.     PROCEDURES AND MODALITIES:  Paraffin:   pre-rx  Moist Heat:    Ice:   post-rx  E-Stim:    Ultrasound:    Ionto:   Traction:    Dry Needling:         Therapy Exercise 90040 14 minutes and NMR 82514 31 minutes     Timed Code Treatment: 45 Minutes  Total Treatment Time: 45 Minutes    Assessment/Plan  Patient contiues to have deficits in single leg balance. This is slowly improving. Suspect she will always have a balance issue augie later in life. She continues to show progress and will continue PT until plateau.     Progress per Plan of Care      Esperanza Oneil, PT, DPT, CHT  Physical Therapist  [place] License # [id number]

## 2019-06-27 ENCOUNTER — TREATMENT (OUTPATIENT)
Dept: PHYSICAL THERAPY | Facility: CLINIC | Age: 24
End: 2019-06-27

## 2019-06-27 DIAGNOSIS — R26.89 IMBALANCE: Primary | ICD-10-CM

## 2019-06-27 DIAGNOSIS — G56.01 CARPAL TUNNEL SYNDROME ON RIGHT: ICD-10-CM

## 2019-06-27 PROCEDURE — 97112 NEUROMUSCULAR REEDUCATION: CPT | Performed by: PHYSICAL THERAPIST

## 2019-06-27 NOTE — PROGRESS NOTES
Physical Therapy Daily Progress Note    Visit #: 21  Juli Luna reports: No new complaints  Pain Scale (0-10):     Subjective       Objective   See Exercise, Manual, and Modality Logs for complete treatment.     PROCEDURES AND MODALITIES:  Paraffin:   pre-rx  Moist Heat:    Ice:   post-rx  E-Stim:    Ultrasound:    Ionto:   Traction:    Dry Needling:         NMR [id number] 40 minutes     Timed Code Treatment: 40 Minutes  Total Treatment Time: 40 Minutes    Assessment/Plan  Her tandem ambulation is improving. There are less losses of balance. Her R SL balance remains poor. L SL balance has improved. She has better recovery of balance when she does lose balance.     Other RECERT NET VISIT      Esperanza Oneil, PT, DPT, CHT  Physical Therapist  [place] License # [id number]

## 2019-07-05 ENCOUNTER — TELEPHONE (OUTPATIENT)
Dept: NEUROSURGERY | Facility: CLINIC | Age: 24
End: 2019-07-05

## 2019-07-09 ENCOUNTER — TREATMENT (OUTPATIENT)
Dept: PHYSICAL THERAPY | Facility: CLINIC | Age: 24
End: 2019-07-09

## 2019-07-09 DIAGNOSIS — G56.01 CARPAL TUNNEL SYNDROME ON RIGHT: ICD-10-CM

## 2019-07-09 DIAGNOSIS — R26.89 IMBALANCE: Primary | ICD-10-CM

## 2019-07-09 PROCEDURE — 97110 THERAPEUTIC EXERCISES: CPT | Performed by: PHYSICAL THERAPIST

## 2019-07-09 PROCEDURE — 97112 NEUROMUSCULAR REEDUCATION: CPT | Performed by: PHYSICAL THERAPIST

## 2019-07-09 NOTE — PROGRESS NOTES
Recertification    Name: Juli Luna  Date: 07/09/2019  Diagnosis/ICD-10 Code:  Imbalance [R26.89]  Referring practitioner: No ref. provider found  Date of Initial Visit:   Visit #: 22  Subjective:   Juli Luna reports: My balance is improving but there are still times when I am moving quickly, like when turning corners, and I will run into the wall.   Subjective Questionnaire: ABC: 87%  Clinical Progress: improved  Home Program Compliance: Yes  Treatment has included: therapeutic exercise and neuromuscular re-education  Objective:   Objective   Computerized Dynamic Posturography  CDP: Sensory Organization Test, Sensory Analysis, Strategy Analysis  Composite Equilibrium Score: 70, 1% below normal  Sensory Analysis WDL: Within Defined Limits  Strategy Analysis: Ankle Dominant     SLS: R LE firm surface 5 sec, L LE firm surface 25 sec  Assessment:   Functional Limitations: Walking, Difficulty moving, Decreased ability to perform ADL's  Pt.'s performance on SOT is improved yet again. She has demonstrated steady improvement in balance, particularly vestibular function, with each reassessment. She continues to have poor R LE SL balance and loss of balance with more ambitious tasks. Patient is motivated to improve balance and functional ability given her young age.   Plan:   PT Interventions: Therapeutic exercise - strengthening, Balance Training, Home Exercise Program  Progress toward previous goals: Partially Met   New LTG: in 4 weeks  1. Patient demonstrates a normal SOT composite equilibrium score of at least 75.   2. Pt perform SLS on R for at least 10 seconds.   Recommendations: Continue as planned  Timeframe: 1 month  Prognosis to achieve goals: good    07/09/2019 Treatments:     Therapy Exercise 38270 13 minutes and NMR 54913 23 minutes (including Neurocom testing)    Timed Code Treatment: 36   Minutes     Total Treatment Time: 40      Minutes      PT Signature: Esperanza Oneil PT, DPT, CHT [place] license #[id number]  [place]  License #: [id number]    Based upon review of the patient's progress and continued therapy plan, it is my medical opinion that Juli Luna should continue physical therapy treatment at [facility]  [address] [place] [address] [phone].    Signature:

## 2019-07-11 ENCOUNTER — TREATMENT (OUTPATIENT)
Dept: PHYSICAL THERAPY | Facility: CLINIC | Age: 24
End: 2019-07-11

## 2019-07-11 DIAGNOSIS — G56.01 CARPAL TUNNEL SYNDROME ON RIGHT: ICD-10-CM

## 2019-07-11 DIAGNOSIS — Z86.79 HISTORY OF INTRACRANIAL HEMORRHAGE: ICD-10-CM

## 2019-07-11 DIAGNOSIS — R26.89 IMBALANCE: Primary | ICD-10-CM

## 2019-07-11 PROCEDURE — 97112 NEUROMUSCULAR REEDUCATION: CPT | Performed by: PHYSICAL THERAPIST

## 2019-07-11 PROCEDURE — 97110 THERAPEUTIC EXERCISES: CPT | Performed by: PHYSICAL THERAPIST

## 2019-07-11 NOTE — PROGRESS NOTES
Physical Therapy Daily Progress Note    Visit #: 23  Juli Luna reports: No new compliants.  Pain Scale (0-10):     Subjective       Objective   See Exercise, Manual, and Modality Logs for complete treatment.     PROCEDURES AND MODALITIES:  Paraffin:   pre-rx  Moist Heat:    Ice:   post-rx  E-Stim:    Ultrasound:    Ionto:   Traction:    Dry Needling:         Therapy Exercise 75493 13 minutes and NMR 59421 25 minutes     Timed Code Treatment: 38 Minutes  Total Treatment Time: 40 Minutes    Assessment/Plan  Patient continues to have poor R LE balance but can now hold 30 sec on L LE. Needs to continue to work on LE strength and core strength to improved stability of R LE.    Progress per Plan of Care      Esperanza Oneil PT, DPT, CHT  Physical Therapist  [place] License # [id number]

## 2019-07-16 ENCOUNTER — TREATMENT (OUTPATIENT)
Dept: PHYSICAL THERAPY | Facility: CLINIC | Age: 24
End: 2019-07-16

## 2019-07-16 DIAGNOSIS — G56.01 CARPAL TUNNEL SYNDROME ON RIGHT: ICD-10-CM

## 2019-07-16 DIAGNOSIS — R26.89 IMBALANCE: Primary | ICD-10-CM

## 2019-07-16 PROCEDURE — 97110 THERAPEUTIC EXERCISES: CPT | Performed by: PHYSICAL THERAPIST

## 2019-07-16 PROCEDURE — 97112 NEUROMUSCULAR REEDUCATION: CPT | Performed by: PHYSICAL THERAPIST

## 2019-07-16 NOTE — PROGRESS NOTES
Physical Therapy Daily Progress Note    Visit #: 24  Juli Luna reports: I can tell my balance is better because it was easier to walk up hills outside. I would have fallen before. This time I did not have any problems.   Pain Scale (0-10):     Subjective       Objective   See Exercise, Manual, and Modality Logs for complete treatment.     PROCEDURES AND MODALITIES:  Paraffin:   pre-rx  Moist Heat:    Ice:   post-rx  E-Stim:    Ultrasound:    Ionto:   Traction:    Dry Needling:         Therapy Exercise 04400 15 minutes and NMR 67407 26 minutes     Timed Code Treatment: 41 Minutes  Total Treatment Time: 41 Minutes    Assessment/Plan  Added palloff press for core strengthening to ther ex today. Patient is reporting functional improvement in walking on inclines/declines and uneven ground. Continues to have R LE weakness and instability.     Progress per Plan of Care      Esperanza Oneil PT, DPT, CHT  Physical Therapist  [place] License # [id number]

## 2019-07-23 ENCOUNTER — TREATMENT (OUTPATIENT)
Dept: PHYSICAL THERAPY | Facility: CLINIC | Age: 24
End: 2019-07-23

## 2019-07-23 DIAGNOSIS — R26.89 IMBALANCE: Primary | ICD-10-CM

## 2019-07-23 PROCEDURE — 97112 NEUROMUSCULAR REEDUCATION: CPT | Performed by: PHYSICAL THERAPIST

## 2019-07-23 PROCEDURE — 97110 THERAPEUTIC EXERCISES: CPT | Performed by: PHYSICAL THERAPIST

## 2019-07-23 NOTE — PROGRESS NOTES
Physical Therapy Daily Progress Note    Visit #: 25  Juli Luna reports: No new complaints. 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   Pain Scale (0-10):     Subjective       Objective   See Exercise, Manual, and Modality Logs for complete treatment.     PROCEDURES AND MODALITIES:  Paraffin:   pre-rx  Moist Heat:    Ice:   post-rx  E-Stim:    Ultrasound:    Ionto:   Traction:    Dry Needling:         Therapy Exercise 96341 12 minutes and NMR 12275 28 minutes     Timed Code Treatment: 40 Minutes  Total Treatment Time: 40 Minutes    Assessment/Plan  Patient balance has been slow to improve. Suspect she is not performing HEP. She is noticing activities that require quick response and walking on uneven ground is easier. Continues to have poor SL balance on R LE.    Progress strengthening /stabilization /functional activity      Esperanza Oneil, PT, DPT, CHT  Physical Therapist  [place] License # [id number]

## 2019-07-25 ENCOUNTER — TREATMENT (OUTPATIENT)
Dept: PHYSICAL THERAPY | Facility: CLINIC | Age: 24
End: 2019-07-25

## 2019-07-25 DIAGNOSIS — R26.89 IMBALANCE: Primary | ICD-10-CM

## 2019-07-25 PROCEDURE — 97112 NEUROMUSCULAR REEDUCATION: CPT | Performed by: PHYSICAL THERAPIST

## 2019-07-25 PROCEDURE — 97110 THERAPEUTIC EXERCISES: CPT | Performed by: PHYSICAL THERAPIST

## 2019-07-25 NOTE — PROGRESS NOTES
Physical Therapy Daily Progress Note    Visit #: 26  Juli Luna reports: I have muscular soreness in my hips after doing the leg press.   Pain Scale (0-10):     Subjective       Objective   See Exercise, Manual, and Modality Logs for complete treatment.     PROCEDURES AND MODALITIES:  Paraffin:   pre-rx  Moist Heat:    Ice:   post-rx  E-Stim:    Ultrasound:    Ionto:   Traction:    Dry Needling:         Therapy Exercise 84090 12 minutes and NMR 69318 28 minutes     Timed Code Treatment: 40 Minutes  Total Treatment Time: 40 Minutes    Assessment/Plan  Unable to progress resistance of LE strengthening exercises. R SL balance is not improved. Her ability to amb in tandem is improving.      Progress strengthening /stabilization /functional activity      Esperanza Oneil, PT, DPT, CHT  Physical Therapist  [place] License # [id number]

## 2019-07-30 ENCOUNTER — TREATMENT (OUTPATIENT)
Dept: PHYSICAL THERAPY | Facility: CLINIC | Age: 24
End: 2019-07-30

## 2019-07-30 DIAGNOSIS — R26.89 IMBALANCE: Primary | ICD-10-CM

## 2019-07-30 PROCEDURE — 97112 NEUROMUSCULAR REEDUCATION: CPT | Performed by: PHYSICAL THERAPIST

## 2019-07-30 PROCEDURE — 97110 THERAPEUTIC EXERCISES: CPT | Performed by: PHYSICAL THERAPIST

## 2019-07-30 NOTE — PROGRESS NOTES
Physical Therapy Daily Progress Note    Visit #: 27  Juli Luna reports: I was on vacation last week. I noticed I could walk on the beach without problems and walk up and down stairs without using the rails. I could not do that before starting PT.   Pain Scale (0-10):     Subjective       Objective   See Exercise, Manual, and Modality Logs for complete treatment.     PROCEDURES AND MODALITIES:  Paraffin:   pre-rx  Moist Heat:    Ice:   post-rx  E-Stim:    Ultrasound:    Ionto:   Traction:    Dry Needling:         Therapy Exercise 53142 16 minutes and NMR 88269 29 minutes     Timed Code Treatment: 45 Minutes  Total Treatment Time: 45 Minutes    Assessment/Plan  Added kneeling activity to HEP today to increase core strength, LE strength and balance. She had some difficulty maintaining the positions,  but with verbal cues to hold abs tight, her stability improved.     Progress strengthening /stabilization /functional activity      Esperanza Oneil, PT, DPT, CHT  Physical Therapist  [place] License # [id number]

## 2019-08-01 ENCOUNTER — TREATMENT (OUTPATIENT)
Dept: PHYSICAL THERAPY | Facility: CLINIC | Age: 24
End: 2019-08-01

## 2019-08-01 DIAGNOSIS — R26.89 IMBALANCE: Primary | ICD-10-CM

## 2019-08-01 DIAGNOSIS — G56.01 CARPAL TUNNEL SYNDROME ON RIGHT: ICD-10-CM

## 2019-08-01 PROCEDURE — 97110 THERAPEUTIC EXERCISES: CPT | Performed by: PHYSICAL THERAPIST

## 2019-08-01 PROCEDURE — 97112 NEUROMUSCULAR REEDUCATION: CPT | Performed by: PHYSICAL THERAPIST

## 2019-08-01 NOTE — PROGRESS NOTES
Physical Therapy Daily Progress Note    Visit #: 28  Juli Luna reports: No new complaints.  Pain Scale (0-10):     Subjective       Objective   See Exercise, Manual, and Modality Logs for complete treatment.     PROCEDURES AND MODALITIES:  Paraffin:   pre-rx  Moist Heat:    Ice:   post-rx  E-Stim:    Ultrasound:    Ionto:   Traction:    Dry Needling:         Therapy Exercise 16512 11 minutes and NMR 87965 31 minutes     Timed Code Treatment: 42 Minutes  Total Treatment Time: 42 Minutes    Assessment/Plan  Patient is closer to having no loss of balance with tandem ambulation. This is a significant improvement since starting PT. She is also gaining core strength needed for LE stability and balance. She continues to show improvement with continued PT.     Progress strengthening /stabilization /functional activity      Esperanza Oneil PT, DPT, CHT  Physical Therapist  [place] License # [id number]

## 2019-08-06 ENCOUNTER — TREATMENT (OUTPATIENT)
Dept: PHYSICAL THERAPY | Facility: CLINIC | Age: 24
End: 2019-08-06

## 2019-08-06 DIAGNOSIS — R26.89 IMBALANCE: Primary | ICD-10-CM

## 2019-08-06 PROCEDURE — 97112 NEUROMUSCULAR REEDUCATION: CPT | Performed by: PHYSICAL THERAPIST

## 2019-08-06 PROCEDURE — 97110 THERAPEUTIC EXERCISES: CPT | Performed by: PHYSICAL THERAPIST

## 2019-08-07 NOTE — PROGRESS NOTES
Physical Therapy Daily Progress Note    Visit #: 29  Juli Luna reports: Some things are easier. Some things like trying to get dressed are still difficulty to stand on one leg.   Pain Scale (0-10):     Subjective       Objective   See Exercise, Manual, and Modality Logs for complete treatment.     PROCEDURES AND MODALITIES:  Paraffin:   pre-rx  Moist Heat:    Ice:   post-rx  E-Stim:    Ultrasound:    Ionto:   Traction:    Dry Needling:         Therapy Exercise 09638 11 minutes and NMR 70305 30 minutes     Timed Code Treatment: 41 Minutes  Total Treatment Time: 41 Minutes    Assessment/Plan  Pt continues to have diffiuculty with R LE balance. She is gaining strength needed to compensate for lack of balance and stability.     Other RECERT Next visit      Esperanza Oneil, PT, DPT, CHT  Physical Therapist  [place] License # [id number]

## 2019-08-08 ENCOUNTER — TREATMENT (OUTPATIENT)
Dept: PHYSICAL THERAPY | Facility: CLINIC | Age: 24
End: 2019-08-08

## 2019-08-08 DIAGNOSIS — R26.89 IMBALANCE: Primary | ICD-10-CM

## 2019-08-08 PROCEDURE — 97110 THERAPEUTIC EXERCISES: CPT | Performed by: PHYSICAL THERAPIST

## 2019-08-08 PROCEDURE — 97112 NEUROMUSCULAR REEDUCATION: CPT | Performed by: PHYSICAL THERAPIST

## 2019-08-09 NOTE — PROGRESS NOTES
Physical Therapy Daily Progress Note    Visit #: 30  Juli Luna reports: No new complaints. I think I will see hand MD for my CTS.   Pain Scale (0-10):     Subjective       Objective   See Exercise, Manual, and Modality Logs for complete treatment.     PROCEDURES AND MODALITIES:  Paraffin:   pre-rx  Moist Heat:    Ice:   post-rx  E-Stim:    Ultrasound:    Ionto:   Traction:    Dry Needling:         Therapy Exercise 59675 15 minutes and NMR 36328 25 minutes     Timed Code Treatment: 40 Minutes  Total Treatment Time: 40 Minutes    Assessment/Plan  Patient can now amb in tandem with very little loss of balance. She continues to report noticeable differences in balance with daily tasks.     Progress strengthening /stabilization /functional activity, RECERT NEXT VISIT      Esperanza Oneil, PT, DPT, CHT  Physical Therapist  [place] License # [id number]

## 2019-08-13 ENCOUNTER — TREATMENT (OUTPATIENT)
Dept: PHYSICAL THERAPY | Facility: CLINIC | Age: 24
End: 2019-08-13

## 2019-08-13 DIAGNOSIS — R26.89 IMBALANCE: Primary | ICD-10-CM

## 2019-08-13 PROCEDURE — 97112 NEUROMUSCULAR REEDUCATION: CPT | Performed by: PHYSICAL THERAPIST

## 2019-08-13 PROCEDURE — 97110 THERAPEUTIC EXERCISES: CPT | Performed by: PHYSICAL THERAPIST

## 2019-08-13 NOTE — PROGRESS NOTES
Recertification    Name: Juli Luna  Date: 08/13/2019  Diagnosis/ICD-10 Code:  Imbalance [R26.89]  Referring practitioner: Self Referring  Date of Initial Visit:   Visit #: 31  Subjective:   Juli Luna reports: I still feel like I am getting better. I am going to start school soon and that will require a lot of stair climbing and walking up/down hills.   Subjective Questionnaire: ABC: 96%  Clinical Progress: improved  Treatment has included: therapeutic exercise and neuromuscular re-education  Objective:   Objective    Computerized Dynamic Posturography  CDP: Sensory Organization Test, Sensory Analysis, Strategy Analysis  Composite Equilibrium Score: 60, 14% below normal  Sensory Analysis WDL: Exceptions to WDL  Sensory Analysis: Vestibular, Preference  Strategy Analysis: Ankle Dominant    Assessment:   Functional Limitations: Walking, Difficulty moving, Decreased ability to perform ADL's  Improvement in balance not as evident this month. Patient continues to report improvements in balance with everyday life. R LE and core weakness is improving slowly. Continues to have poor R LE SL balance.   Plan:   PT Interventions: Therapeutic exercise - strengthening, Balance Training, Home Exercise Program  Progress toward previous goals: Partially Met  Recommendations: Continue as planned  Timeframe: 1 month  Prognosis to achieve goals: good    08/13/2019 Treatments:     Therapy Exercise 69409 12 minutes and NMR 62683 25 minutes    Timed Code Treatment: 37   Minutes     Total Treatment Time: 40      Minutes      PT Signature: Esperanza Oneil PT, DPT, CHT [place] license #[id number]  [place] License #: [id number]    Based upon review of the patient's progress and continued therapy plan, it is my medical opinion that Juli Luna should continue physical therapy treatment at [facility] PH[facility]  [address] [place] [id number] [phone].    Signature:  Referring, Self

## 2019-08-15 ENCOUNTER — TREATMENT (OUTPATIENT)
Dept: PHYSICAL THERAPY | Facility: CLINIC | Age: 24
End: 2019-08-15

## 2019-08-15 DIAGNOSIS — R26.89 IMBALANCE: Primary | ICD-10-CM

## 2019-08-15 PROCEDURE — 97112 NEUROMUSCULAR REEDUCATION: CPT | Performed by: PHYSICAL THERAPIST

## 2019-08-15 PROCEDURE — 97110 THERAPEUTIC EXERCISES: CPT | Performed by: PHYSICAL THERAPIST

## 2019-08-15 NOTE — PROGRESS NOTES
Physical Therapy Daily Progress Note    Visit #: 32  Juli Luna reports: No new complaints.  Pain Scale (0-10):     Subjective       Objective   See Exercise, Manual, and Modality Logs for complete treatment.     PROCEDURES AND MODALITIES:  Paraffin:   pre-rx  Moist Heat:    Ice:   post-rx  E-Stim:    Ultrasound:    Ionto:   Traction:    Dry Needling:         Therapy Exercise 91000 16 minutes and NMR 34512 23 minutes     Timed Code Treatment: 39 Minutes  Total Treatment Time: 39 Minutes    Assessment/Plan  Added palloff press in partial kneeling position for core strengthening to ther ex today. Patient is reporting functional improvement in walking on inclines/declines and uneven ground. Continues to have R LE weakness and instability.     Progress per Plan of Care      Esperanza Oneil, PT, DPT, CHT  Physical Therapist  [place] License # [id number]

## 2019-08-20 ENCOUNTER — TREATMENT (OUTPATIENT)
Dept: PHYSICAL THERAPY | Facility: CLINIC | Age: 24
End: 2019-08-20

## 2019-08-20 DIAGNOSIS — R26.89 IMBALANCE: Primary | ICD-10-CM

## 2019-08-20 PROCEDURE — 97112 NEUROMUSCULAR REEDUCATION: CPT | Performed by: PHYSICAL THERAPIST

## 2019-08-20 PROCEDURE — 97110 THERAPEUTIC EXERCISES: CPT | Performed by: PHYSICAL THERAPIST

## 2019-08-20 NOTE — TELEPHONE ENCOUNTER
I agree, this is something that should be worked up by PCP's office since we never addressed her cervical spine and it sounds like a new problem.    Patient called left message, she would like to  folder, soap and sign consent form on 8/21/19.    Patient has not picked out surgery date.

## 2019-08-20 NOTE — PROGRESS NOTES
Physical Therapy Daily Progress Note    Visit #: 33  Juli Luna reports: School starts in a few weeks.   Pain Scale (0-10):     Subjective       Objective   See Exercise, Manual, and Modality Logs for complete treatment.     PROCEDURES AND MODALITIES:  Paraffin:   pre-rx  Moist Heat:    Ice:   post-rx  E-Stim:    Ultrasound:    Ionto:   Traction:    Dry Needling:         Therapy Exercise 95191 16 minutes and NMR 13194 23 minutes     Timed Code Treatment: 39 Minutes  Total Treatment Time: 40 Minutes    Assessment/Plan  Progress is slow with this patient but I do not think she has reached plateau. I do think she could be doing more at home to work on her balance and strength.     Progress strengthening /stabilization /functional activity      Esperanza Oneil PT, DPT, CHT  Physical Therapist  [place] License # [id number]

## 2019-08-22 ENCOUNTER — TREATMENT (OUTPATIENT)
Dept: PHYSICAL THERAPY | Facility: CLINIC | Age: 24
End: 2019-08-22

## 2019-08-22 DIAGNOSIS — R26.89 IMBALANCE: Primary | ICD-10-CM

## 2019-08-22 PROCEDURE — 97110 THERAPEUTIC EXERCISES: CPT | Performed by: PHYSICAL THERAPIST

## 2019-08-22 PROCEDURE — 97112 NEUROMUSCULAR REEDUCATION: CPT | Performed by: PHYSICAL THERAPIST

## 2019-08-22 NOTE — PROGRESS NOTES
Physical Therapy Daily Progress Note    Visit #: 34  Juli Luna reports: kneeling is making my knee hurt  Pain Scale (0-10):     Subjective       Objective   See Exercise, Manual, and Modality Logs for complete treatment.     PROCEDURES AND MODALITIES:  Paraffin:   pre-rx  Moist Heat:    Ice:   post-rx  E-Stim:    Ultrasound:    Ionto:   Traction:    Dry Needling:         Therapy Exercise 31641 15 minutes and NMR 43846 25 minutes     Timed Code Treatment: 40 Minutes  Total Treatment Time: 40 Minutes    Assessment/Plan  Patient was able to stand on BOSU and toss ball back and forth. She was not able to do this a month ago. She is also no losing balance while side stepping on foam beam. She has consistently lost balance backwards in prior weeks. Held kneeling exercise due to knee pain.     Progress strengthening /stabilization /functional activity      Esperanza Oneil PT, DPT, CHT  Physical Therapist  [place] License # [id number]

## 2019-08-29 ENCOUNTER — TREATMENT (OUTPATIENT)
Dept: PHYSICAL THERAPY | Facility: CLINIC | Age: 24
End: 2019-08-29

## 2019-08-29 DIAGNOSIS — R26.89 IMBALANCE: Primary | ICD-10-CM

## 2019-08-29 PROCEDURE — 97110 THERAPEUTIC EXERCISES: CPT | Performed by: PHYSICAL THERAPIST

## 2019-08-29 PROCEDURE — 97112 NEUROMUSCULAR REEDUCATION: CPT | Performed by: PHYSICAL THERAPIST

## 2019-08-29 NOTE — PROGRESS NOTES
Physical Therapy Daily Progress Note    Visit #: 35  Juli Luna reports: School starts on Monday. I take mostly night classes. No new reports of loss of balance.   Pain Scale (0-10):     Subjective       Objective   See Exercise, Manual, and Modality Logs for complete treatment.     PROCEDURES AND MODALITIES:  Paraffin:   pre-rx  Moist Heat:    Ice:   post-rx  E-Stim:    Ultrasound:    Ionto:   Traction:    Dry Needling:         Therapy Exercise 59495 12 minutes and NMR 90153 26 minutes     Timed Code Treatment: 38 Minutes  Total Treatment Time: 40 Minutes    Assessment/Plan  Patient continues to demonstrate improvement in balance tasks in clinic. She can now walk in tandem with no loss of balance and minimal on foam beam. No loss of balance on BOSU or INDO board.     Progress per Plan of Care      Esperanza Oneil PT, DPT, CHT  Physical Therapist  [place] License # [id number]

## 2019-09-03 ENCOUNTER — TREATMENT (OUTPATIENT)
Dept: PHYSICAL THERAPY | Facility: CLINIC | Age: 24
End: 2019-09-03

## 2019-09-03 DIAGNOSIS — R26.89 IMBALANCE: Primary | ICD-10-CM

## 2019-09-03 PROCEDURE — 97112 NEUROMUSCULAR REEDUCATION: CPT | Performed by: PHYSICAL THERAPIST

## 2019-09-03 PROCEDURE — 97110 THERAPEUTIC EXERCISES: CPT | Performed by: PHYSICAL THERAPIST

## 2019-09-04 NOTE — PROGRESS NOTES
Physical Therapy Daily Progress Note    Visit #: 36  Juli Luna reports: My knee is still bothering me when I go down stairs.   Pain Scale (0-10):     Subjective       Objective   See Exercise, Manual, and Modality Logs for complete treatment.     PROCEDURES AND MODALITIES:  Paraffin:   pre-rx  Moist Heat:    Ice:   post-rx  E-Stim:    Ultrasound:    Ionto:   Traction:    Dry Needling:         Therapy Exercise 09848 12 minutes and NMR 03998 26 minutes     Timed Code Treatment: 38 Minutes  Total Treatment Time: 40 Minutes    Assessment/Plan  Patient has progressed to ambulating in tandem on foam with minimal loss of balance. She continues to have difficulty with SLS on R LE. L SL balance has improved. Balance is still not WNL for age.     Progress strengthening /stabilization /functional activity      Esperanza Oneil PT, DPT, CHT  Physical Therapist  [place] License # [id number]

## 2019-09-05 ENCOUNTER — TREATMENT (OUTPATIENT)
Dept: PHYSICAL THERAPY | Facility: CLINIC | Age: 24
End: 2019-09-05

## 2019-09-05 DIAGNOSIS — R26.89 IMBALANCE: Primary | ICD-10-CM

## 2019-09-05 PROCEDURE — 97112 NEUROMUSCULAR REEDUCATION: CPT | Performed by: PHYSICAL THERAPIST

## 2019-09-05 PROCEDURE — 97110 THERAPEUTIC EXERCISES: CPT | Performed by: PHYSICAL THERAPIST

## 2019-09-05 NOTE — PROGRESS NOTES
Physical Therapy Daily Progress Note    Visit #: 37  Juli Luna reports: I have trouble walking up hills when I am carrying books.   Pain Scale (0-10):     Subjective       Objective   See Exercise, Manual, and Modality Logs for complete treatment.     PROCEDURES AND MODALITIES:  Paraffin:   pre-rx  Moist Heat:    Ice:   post-rx  E-Stim:    Ultrasound:    Ionto:   Traction:    Dry Needling:         Therapy Exercise 15061 18 minutes and NMR 94468 23 minutes     Timed Code Treatment: 41 Minutes  Total Treatment Time: 41 Minutes    Assessment/Plan  Added to core strengthening today. She continues to demonstrate improvement in balance performance on tasks in clinic. R LE continues to be weaker that the L.     Progress per Plan of Care      Esperanza Oneil, PT, DPT, CHT  Physical Therapist  [place] License # [id number]

## 2019-09-10 ENCOUNTER — TREATMENT (OUTPATIENT)
Dept: PHYSICAL THERAPY | Facility: CLINIC | Age: 24
End: 2019-09-10

## 2019-09-10 DIAGNOSIS — R26.89 IMBALANCE: Primary | ICD-10-CM

## 2019-09-10 PROCEDURE — 97112 NEUROMUSCULAR REEDUCATION: CPT | Performed by: PHYSICAL THERAPIST

## 2019-09-10 PROCEDURE — 97110 THERAPEUTIC EXERCISES: CPT | Performed by: PHYSICAL THERAPIST

## 2019-09-11 NOTE — PROGRESS NOTES
Physical Therapy Daily Progress Note    Visit #: 38  Juli Luna reports: My knee is feeling better.   Pain Scale (0-10):     Subjective       Objective   See Exercise, Manual, and Modality Logs for complete treatment.     PROCEDURES AND MODALITIES:  Paraffin:   pre-rx  Moist Heat:    Ice:   post-rx  E-Stim:    Ultrasound:    Ionto:   Traction:    Dry Needling:         Therapy Exercise 39525 12 minutes and NMR 99346 29 minutes     Timed Code Treatment: 41 Minutes  Total Treatment Time: 41 Minutes    Assessment/Plan  Patient has improved at holding core tight during more difficult balance exercises to improve performance.     Progress strengthening /stabilization /functional activity RECERT NEXT VISIT      Esperanza Oneil, PT, DPT, CHT  Physical Therapist  [place] License # [id number]

## 2019-09-13 ENCOUNTER — OFFICE VISIT (OUTPATIENT)
Dept: PHYSICAL THERAPY | Facility: CLINIC | Age: 24
End: 2019-09-13

## 2019-09-13 DIAGNOSIS — R26.89 IMBALANCE: Primary | ICD-10-CM

## 2019-09-13 PROCEDURE — 97110 THERAPEUTIC EXERCISES: CPT | Performed by: PHYSICAL THERAPIST

## 2019-09-13 PROCEDURE — 97112 NEUROMUSCULAR REEDUCATION: CPT | Performed by: PHYSICAL THERAPIST

## 2019-09-13 NOTE — PROGRESS NOTES
Physical Therapy Daily Progress Note    Visit #: 39  Juli Luna reports: No new compliants.  Pain Scale (0-10):     Subjective       Objective   See Exercise, Manual, and Modality Logs for complete treatment.     PROCEDURES AND MODALITIES:  Paraffin:   pre-rx  Moist Heat:    Ice:   post-rx  E-Stim:    Ultrasound:    Ionto:   Traction:    Dry Needling:         Therapy Exercise 89649 12 minutes and NMR 17730 29 minutes     Timed Code Treatment: 41 Minutes  Total Treatment Time: 41 Minutes    Assessment/Plan  She continues to report noticeable differences in balance with daily tasks. R LE may always be weaker then the L. Progress core strengthening.     Progress per Plan of Care      Esperanza Oneil, PT, DPT, CHT  Physical Therapist  [place] License # [id number]

## 2019-09-17 ENCOUNTER — TREATMENT (OUTPATIENT)
Dept: PHYSICAL THERAPY | Facility: CLINIC | Age: 24
End: 2019-09-17

## 2019-09-17 DIAGNOSIS — R26.89 IMBALANCE: Primary | ICD-10-CM

## 2019-09-17 DIAGNOSIS — Z86.79 HISTORY OF INTRACRANIAL HEMORRHAGE: ICD-10-CM

## 2019-09-17 PROCEDURE — 97110 THERAPEUTIC EXERCISES: CPT | Performed by: PHYSICAL THERAPIST

## 2019-09-17 PROCEDURE — 97112 NEUROMUSCULAR REEDUCATION: CPT | Performed by: PHYSICAL THERAPIST

## 2019-09-17 NOTE — PROGRESS NOTES
Recertification    Name: Juli Luna  Date: 09/17/2019  Diagnosis/ICD-10 Code:  Imbalance [R26.89]  Referring practitioner: Self Referring  Date of Initial Visit:   Visit #: 40  Subjective:   Juli Luna reports: ***  Subjective Questionnaire: {PT subjective questionnaires:79546}  Clinical Progress: {UNCHANGED, IMPROVED, WORSE:38133}  Home Program Compliance: {YES/NA/NO:68228}  Treatment has included: {PT interventions:27493}  Objective:   Objective                            Assessment:        Plan:      Progress toward previous goals: {all/partially/not met:51093}  Recommendations: {Reassess plan:06279}  Timeframe: { timeframe:4323922952}  Prognosis to achieve goals: {GOOD/FAIR/POOR:43007}    09/17/2019 Treatments:     {PT Rx Minutes:57874}    Timed Code Treatment: ***   Minutes     Total Treatment Time: ***      Minutes      PT Signature: Esperanza Oneil PT, DPT, CHT [place] license #[id number]  [place] License #: [id number]    Based upon review of the patient's progress and continued therapy plan, it is my medical opinion that Juli Luna should continue physical therapy treatment at [facility] PH[facility] TH[facility]AP[facility]  [address] [place] [id number] [phone].    Signature:  Referring, Self

## 2019-09-18 NOTE — PROGRESS NOTES
Recertification    Name: Juli Luna  Date: 09/17/2019  Diagnosis/ICD-10 Code:  Imbalance [R26.89]  Referring practitioner: Self Referring  Date of Initial Visit:   Visit #: 40  Subjective:   Juli Luna reports: I went to a concert over the weekend and I had trouble walking in the dark on gravel. I had to hang onto my friend.   Clinical Progress: improved  Home Program Compliance: Yes  Treatment has included: therapeutic exercise and neuromuscular re-education  Objective:   Objective   Computerized Dynamic Posturography  CDP: Sensory Organization Test  Composite Equilibrium Score: (72, WNL)  Sensory Analysis WDL: Within Defined Limits  Strategy Analysis: Ankle Dominant     Assessment:   Functional Limitations: Walking, Difficulty moving, Decreased ability to perform ADL's  Balance testing significantly improved since last test. She is demonstrating improvement in use of all sensory systems of balance. Core strength is improving as evidenced by increasing weight on the core strengthening exercises. Continues to have weakness in R LE greater than L LE.  Plan:   PT Interventions: Therapeutic exercise - strengthening, Balance Training, Home Exercise Program  Progress toward previous goals: Partially Met  Recommendations: Continue as planned  Timeframe: 1 month  Prognosis to achieve goals: good    09/17/2019 Treatments:     Therapy Exercise 40793 13 minutes and NMR 64672 25 minutes    Timed Code Treatment: 38  Minutes     Total Treatment Time: 40      Minutes      PT Signature: Esperanza Oneil, PT, DPT, CHT [place] license #[id number]  [place] License #: [id number]    Based upon review of the patient's progress and continued therapy plan, it is my medical opinion that Juli Luna should continue physical therapy treatment at [facility] PH[facility] THERAP[facility]  [address] [place] [id number] [phone].    Signature:  Referring, Self

## 2019-09-24 ENCOUNTER — TREATMENT (OUTPATIENT)
Dept: PHYSICAL THERAPY | Facility: CLINIC | Age: 24
End: 2019-09-24

## 2019-09-24 DIAGNOSIS — Z86.79 HISTORY OF INTRACRANIAL HEMORRHAGE: ICD-10-CM

## 2019-09-24 DIAGNOSIS — R26.89 IMBALANCE: Primary | ICD-10-CM

## 2019-09-24 PROCEDURE — 97112 NEUROMUSCULAR REEDUCATION: CPT | Performed by: PHYSICAL THERAPIST

## 2019-09-24 PROCEDURE — 97110 THERAPEUTIC EXERCISES: CPT | Performed by: PHYSICAL THERAPIST

## 2019-09-24 NOTE — PROGRESS NOTES
Physical Therapy Daily Progress Note    Visit #: 41  Juli Luna reports: No new compliants.   Subjective       Objective   See Exercise, Manual, and Modality Logs for complete treatment.     PROCEDURES AND MODALITIES:  Paraffin:   pre-rx  Moist Heat:    Ice:   post-rx  E-Stim:    Ultrasound:    Ionto:   Traction:    Dry Needling:         Therapy Exercise 39776 16 minutes and NMR 43989 29 minutes     Timed Code Treatment: 45 Minutes  Total Treatment Time: 45 Minutes    Assessment/Plan  When she actively holds core tight balance is better. We have added more core strengthening exercises. Continues to demonstrate improvement.     Progress per Plan of Care      Esperanza Oneil PT, DPT, CHT  Physical Therapist  [place] License # [id number]

## 2019-09-26 ENCOUNTER — TREATMENT (OUTPATIENT)
Dept: PHYSICAL THERAPY | Facility: CLINIC | Age: 24
End: 2019-09-26

## 2019-09-26 DIAGNOSIS — R26.89 IMBALANCE: Primary | ICD-10-CM

## 2019-09-26 PROCEDURE — 97112 NEUROMUSCULAR REEDUCATION: CPT | Performed by: PHYSICAL THERAPIST

## 2019-09-26 PROCEDURE — 97110 THERAPEUTIC EXERCISES: CPT | Performed by: PHYSICAL THERAPIST

## 2019-09-26 NOTE — PROGRESS NOTES
Physical Therapy Daily Progress Note    Visit #: 42  Juli Luna reports: I can tell when I told my core tight I can balance better. My hands have not bothered me much. A little more since I went back to school.   Pain Scale (0-10):     Subjective       Objective   See Exercise, Manual, and Modality Logs for complete treatment.     PROCEDURES AND MODALITIES:  Paraffin:   pre-rx  Moist Heat:    Ice:   post-rx  E-Stim:    Ultrasound:    Ionto:   Traction:    Dry Needling:         Therapy Exercise 71532 23 minutes and NMR 97859 30 minutes     Timed Code Treatment: 53 Minutes  Total Treatment Time: 53 Minutes    Assessment/Plan  Patient is demonstrating increased stability with SL balance tasks. Has not been able to progress resistance of leg press. Not as much improvement in core and LE strength.     Progress strengthening /stabilization /functional activity      Esperanza Oneil PT, DPT, CHT  Physical Therapist  [place] License # [id number]

## 2019-10-01 ENCOUNTER — TREATMENT (OUTPATIENT)
Dept: PHYSICAL THERAPY | Facility: CLINIC | Age: 24
End: 2019-10-01

## 2019-10-01 DIAGNOSIS — R26.89 IMBALANCE: Primary | ICD-10-CM

## 2019-10-01 DIAGNOSIS — Z86.79 HISTORY OF INTRACRANIAL HEMORRHAGE: ICD-10-CM

## 2019-10-01 PROCEDURE — 97112 NEUROMUSCULAR REEDUCATION: CPT | Performed by: PHYSICAL THERAPIST

## 2019-10-01 PROCEDURE — 97110 THERAPEUTIC EXERCISES: CPT | Performed by: PHYSICAL THERAPIST

## 2019-10-01 NOTE — PROGRESS NOTES
Physical Therapy Daily Progress Note    Visit #: 43  Juli Luna reports: No new compliants.   Pain Scale (0-10):     Subjective       Objective   See Exercise, Manual, and Modality Logs for complete treatment.     PROCEDURES AND MODALITIES:  Paraffin:   pre-rx  Moist Heat:    Ice:   post-rx  E-Stim:    Ultrasound:    Ionto:   Traction:    Dry Needling:         Therapy Exercise 63134 20 minutes and NMR 22059 25 minutes     Timed Code Treatment: 45 Minutes  Total Treatment Time: 45 Minutes    Assessment/Plan  Needs to continue strengthening for R LE stability and core strength. She continues to demonstrate slow improvement in balance.     Progress per Plan of Care      Esperanza Oneil, PT, DPT, CHT  Physical Therapist  [place] License # [id number]

## 2019-10-10 ENCOUNTER — TREATMENT (OUTPATIENT)
Dept: PHYSICAL THERAPY | Facility: CLINIC | Age: 24
End: 2019-10-10

## 2019-10-10 DIAGNOSIS — Z86.79 HISTORY OF INTRACRANIAL HEMORRHAGE: ICD-10-CM

## 2019-10-10 DIAGNOSIS — R26.89 IMBALANCE: Primary | ICD-10-CM

## 2019-10-10 PROCEDURE — 97110 THERAPEUTIC EXERCISES: CPT | Performed by: PHYSICAL THERAPIST

## 2019-10-10 PROCEDURE — 97112 NEUROMUSCULAR REEDUCATION: CPT | Performed by: PHYSICAL THERAPIST

## 2019-10-10 NOTE — PROGRESS NOTES
Physical Therapy Daily Progress Note    Visit #: 44  Juli Luna reports: Went on a cruise to [place]. Did not have any trouble on the boat but did in the water. I was better in the water that before I started working on my balance. Did fall but it was because of wet ground and slick flip flops.   Pain Scale (0-10):     Subjective       Objective   See Exercise, Manual, and Modality Logs for complete treatment.     PROCEDURES AND MODALITIES:  Paraffin:   pre-rx  Moist Heat:    Ice:   post-rx  E-Stim:    Ultrasound:    Ionto:   Traction:    Dry Needling:         Therapy Exercise 48914 20 minutes and NMR 93850 25 minutes     Timed Code Treatment: 45 Minutes  Total Treatment Time: 45 Minutes    Assessment/Plan  Patient reports improvement in stability with more ambitious activites such as walking in the water. She is slowly improving. Needs further PT for safety.    Progress per Plan of Care      Esperanza Oneil, PT, DPT, CHT  Physical Therapist  [place] License # [id number]

## 2019-10-15 ENCOUNTER — TREATMENT (OUTPATIENT)
Dept: PHYSICAL THERAPY | Facility: CLINIC | Age: 24
End: 2019-10-15

## 2019-10-15 DIAGNOSIS — R26.89 IMBALANCE: Primary | ICD-10-CM

## 2019-10-15 DIAGNOSIS — Z86.79 HISTORY OF INTRACRANIAL HEMORRHAGE: ICD-10-CM

## 2019-10-15 PROCEDURE — 97112 NEUROMUSCULAR REEDUCATION: CPT | Performed by: PHYSICAL THERAPIST

## 2019-10-15 PROCEDURE — 97110 THERAPEUTIC EXERCISES: CPT | Performed by: PHYSICAL THERAPIST

## 2019-10-15 NOTE — PROGRESS NOTES
Physical Therapy Daily Progress Note    Visit #: 45  Juli Luna reports: No new complaints.  Pain Scale (0-10):     Subjective       Objective   See Exercise, Manual, and Modality Logs for complete treatment.     PROCEDURES AND MODALITIES:  Paraffin:   pre-rx  Moist Heat:    Ice:   post-rx  E-Stim:    Ultrasound:    Ionto:   Traction:    Dry Needling:         Therapy Exercise 96970 20 minutes and NMR 74352 22 minutes     Timed Code Treatment: 42 Minutes  Total Treatment Time: 42 Minutes    Assessment/Plan  R LE continues to have decreased stability and coordination. Her ability to right herself when she does lose balance is improved. Needs to continue core strengthening.     Progress per Plan of Care, RECERT NEXT VISIT      Esperanza Oneil, PT, DPT, CHT  Physical Therapist  [place] License # [id number]

## 2019-10-17 ENCOUNTER — TREATMENT (OUTPATIENT)
Dept: PHYSICAL THERAPY | Facility: CLINIC | Age: 24
End: 2019-10-17

## 2019-10-17 DIAGNOSIS — Z86.79 HISTORY OF INTRACRANIAL HEMORRHAGE: ICD-10-CM

## 2019-10-17 DIAGNOSIS — R26.89 IMBALANCE: Primary | ICD-10-CM

## 2019-10-17 PROCEDURE — 97112 NEUROMUSCULAR REEDUCATION: CPT | Performed by: PHYSICAL THERAPIST

## 2019-10-17 PROCEDURE — 97110 THERAPEUTIC EXERCISES: CPT | Performed by: PHYSICAL THERAPIST

## 2019-10-18 NOTE — PROGRESS NOTES
Physical Therapy Daily Progress Note    Visit #: 46  Juli Luna reports: Doing ok with walking from class at night.   Pain Scale (0-10):     Subjective       Objective   See Exercise, Manual, and Modality Logs for complete treatment.     PROCEDURES AND MODALITIES:  Paraffin:   pre-rx  Moist Heat:    Ice:   post-rx  E-Stim:    Ultrasound:    Ionto:   Traction:    Dry Needling:         Therapy Exercise 80331 20 minutes and NMR 06958 23 minutes     Timed Code Treatment: 43 Minutes  Total Treatment Time: 43 Minutes    Assessment/Plan  Patient has progressed to ambulating in tandem on foam with minimal loss of balance. She continues to have difficulty with SLS on R LE. L SL balance has improved.    Progress per Plan of Care, RECERT NEXT VISIT      Esperanza Oneil, PT, DPT, CHT  Physical Therapist  [place] License # [id number]

## 2019-10-22 ENCOUNTER — TREATMENT (OUTPATIENT)
Dept: PHYSICAL THERAPY | Facility: CLINIC | Age: 24
End: 2019-10-22

## 2019-10-22 DIAGNOSIS — R26.89 IMBALANCE: Primary | ICD-10-CM

## 2019-10-22 DIAGNOSIS — Z86.79 HISTORY OF INTRACRANIAL HEMORRHAGE: ICD-10-CM

## 2019-10-22 PROCEDURE — 97112 NEUROMUSCULAR REEDUCATION: CPT | Performed by: PHYSICAL THERAPIST

## 2019-10-22 PROCEDURE — 97110 THERAPEUTIC EXERCISES: CPT | Performed by: PHYSICAL THERAPIST

## 2019-10-22 NOTE — PROGRESS NOTES
Recertification    Name: Juli Luna  Date: 10/22/2019  Diagnosis/ICD-10 Code:  Imbalance [R26.89]  Referring practitioner: Self Referring  Date of Initial Visit:   Visit #: 47  Subjective:   Juli Luna reports: I can tell I am better than I was. There are still a few activities that I am uncoordinated doing and don't have good enough balance for, like running.   Subjective Questionnaire: ABC: 95%  Clinical Progress: improved  Home Program Compliance: Yes  Treatment has included: therapeutic exercise and neuromuscular re-education  Objective:   Objective   Computerized Dynamic Posturography  CDP: Sensory Organization Test  Composite Equilibrium Score: (70, 1% below normal)  Sensory Analysis WDL: Exceptions to WDL  Sensory Analysis: Vestibular  Strategy Analysis: Ankle Dominant     R SLS firm surface: 7 sec w/EO  Assessment:   Functional Limitations: Walking, Difficulty moving, Decreased ability to perform ADL's  Pt. continues to report noticeable differences in balance with daily tasks. R LE may always be weaker then the L. R LE and core weakness is improving slowly. Continues to have poor R LE SL balance.    Plan:   PT Interventions: Therapeutic exercise - strengthening, Balance Training, Home Exercise Program  Progress toward previous goals: Partially Met   New LTG: in 4 weeks  1. Patient demonstrates a normal SOT composite equilibrium score of at least 75. NOT MET  2. Pt perform SLS on R for at least 10 seconds. NOT MET  Recommendations: Continue as planned until plateau  Timeframe: 3 months, 2 x a week  Prognosis to achieve goals: good    10/22/2019 Treatments:     Therapy Exercise 54506 15 minutes and NMR 62299 30 minutes    Timed Code Treatment: 45   Minutes     Total Treatment Time: 50      Minutes      PT Signature: Esperanza Oneil, PT, DPT, CHT [place] license #[id number]  [place] License #: [id number]    Based upon review of the patient's progress and continued therapy plan, it is my medical opinion that Juli Luna should  continue physical therapy treatment at [facility]  [facility] PH[facility] THERAP[facility]  [address] [address] [phone].    Signature:  Referring, Self

## 2019-10-24 ENCOUNTER — TREATMENT (OUTPATIENT)
Dept: PHYSICAL THERAPY | Facility: CLINIC | Age: 24
End: 2019-10-24

## 2019-10-24 DIAGNOSIS — Z86.79 HISTORY OF INTRACRANIAL HEMORRHAGE: ICD-10-CM

## 2019-10-24 DIAGNOSIS — R26.89 IMBALANCE: Primary | ICD-10-CM

## 2019-10-24 PROCEDURE — 97112 NEUROMUSCULAR REEDUCATION: CPT | Performed by: PHYSICAL THERAPIST

## 2019-10-24 PROCEDURE — 97110 THERAPEUTIC EXERCISES: CPT | Performed by: PHYSICAL THERAPIST

## 2019-10-24 NOTE — PROGRESS NOTES
Physical Therapy Daily Progress Note    Visit #: 48  Juli Luna reports: No new complaints.   Pain Scale (0-10):     Subjective       Objective   See Exercise, Manual, and Modality Logs for complete treatment.       R SLS: 5 sec, L SLS: 20 sec   PROCEDURES AND MODALITIES:  Paraffin:   pre-rx  Moist Heat:    Ice:   post-rx  E-Stim:    Ultrasound:    Ionto:   Traction:    Dry Needling:         Therapy Exercise 58322 19 minutes and NMR 27975 24 minutes     Timed Code Treatment: 43 Minutes  Total Treatment Time: 45 Minutes    Assessment/Plan  L SLS ability improved. R SLS remains poor.     Progress per Plan of Care      Esperanza Oneil, PT, DPT, CHT  Physical Therapist  [place] License # [id number]

## 2019-10-31 ENCOUNTER — TREATMENT (OUTPATIENT)
Dept: PHYSICAL THERAPY | Facility: CLINIC | Age: 24
End: 2019-10-31

## 2019-10-31 DIAGNOSIS — Z86.79 HISTORY OF INTRACRANIAL HEMORRHAGE: ICD-10-CM

## 2019-10-31 DIAGNOSIS — R26.89 IMBALANCE: Primary | ICD-10-CM

## 2019-10-31 PROCEDURE — 97110 THERAPEUTIC EXERCISES: CPT | Performed by: PHYSICAL THERAPIST

## 2019-10-31 PROCEDURE — 97112 NEUROMUSCULAR REEDUCATION: CPT | Performed by: PHYSICAL THERAPIST

## 2019-11-05 ENCOUNTER — TREATMENT (OUTPATIENT)
Dept: PHYSICAL THERAPY | Facility: CLINIC | Age: 24
End: 2019-11-05

## 2019-11-05 DIAGNOSIS — R26.89 IMBALANCE: Primary | ICD-10-CM

## 2019-11-05 DIAGNOSIS — R26.9 ABNORMAL GAIT: ICD-10-CM

## 2019-11-05 DIAGNOSIS — Z86.79 HISTORY OF INTRACRANIAL HEMORRHAGE: ICD-10-CM

## 2019-11-05 PROCEDURE — 97110 THERAPEUTIC EXERCISES: CPT | Performed by: PHYSICAL THERAPIST

## 2019-11-05 PROCEDURE — 97112 NEUROMUSCULAR REEDUCATION: CPT | Performed by: PHYSICAL THERAPIST

## 2019-11-05 NOTE — PROGRESS NOTES
Physical Therapy Daily Progress Note    Visit #: 50  Juli Luna reports: I am more confident when I step down off things. I would have fallen before I started therapy with you.   Pain Scale (0-10):     Subjective       Objective   See Exercise, Manual, and Modality Logs for complete treatment.     PROCEDURES AND MODALITIES:  Paraffin:   pre-rx  Moist Heat:    Ice:   post-rx  E-Stim:    Ultrasound:    Ionto:   Traction:    Dry Needling:         Therapy Exercise 73723 19 minutes and NMR 31020 24 minutes     Timed Code Treatment: 43 Minutes  Total Treatment Time: 43 Minutes    Assessment/Plan  Patient continues to have poor SL balance R worse than L. Able to progress core ex today as she will need further core strengthening to improve balance. She still does not have balance that is normal for her age. She also denies compliance with HEP.     Progress per Plan of Care      Esperanza Oneil, PT, DPT, CHT  Physical Therapist  [place] License # [id number]

## 2019-11-07 ENCOUNTER — TREATMENT (OUTPATIENT)
Dept: PHYSICAL THERAPY | Facility: CLINIC | Age: 24
End: 2019-11-07

## 2019-11-07 DIAGNOSIS — Z86.79 HISTORY OF INTRACRANIAL HEMORRHAGE: ICD-10-CM

## 2019-11-07 DIAGNOSIS — R26.89 IMBALANCE: Primary | ICD-10-CM

## 2019-11-07 PROCEDURE — 97110 THERAPEUTIC EXERCISES: CPT | Performed by: PHYSICAL THERAPIST

## 2019-11-07 PROCEDURE — 97112 NEUROMUSCULAR REEDUCATION: CPT | Performed by: PHYSICAL THERAPIST

## 2019-11-07 NOTE — PROGRESS NOTES
Physical Therapy Daily Progress Note    Visit #: 51  Juli Luna reports: I am still having tightness in my calf.   Pain Scale (0-10):     Subjective       Objective   See Exercise, Manual, and Modality Logs for complete treatment.     PROCEDURES AND MODALITIES:  Paraffin:   pre-rx  Moist Heat:    Ice:   post-rx  E-Stim:    Ultrasound:    Ionto:   Traction:    Dry Needling:         Therapy Exercise 34901 19 minutes and NMR 59392 25 minutes     Timed Code Treatment: 44 Minutes  Total Treatment Time: 44 Minutes    Assessment/Plan  Patient is stretching her calf muscles. Unsure what is causing tightness other than strengthening of ankles doing balance exercises.     Progress strengthening /stabilization /functional activity      Esperanza Oneil PT, DPT, CHT  Physical Therapist  [place] License # [id number]

## 2019-11-12 ENCOUNTER — TREATMENT (OUTPATIENT)
Dept: PHYSICAL THERAPY | Facility: CLINIC | Age: 24
End: 2019-11-12

## 2019-11-12 DIAGNOSIS — Z86.79 HISTORY OF INTRACRANIAL HEMORRHAGE: ICD-10-CM

## 2019-11-12 DIAGNOSIS — R26.89 IMBALANCE: Primary | ICD-10-CM

## 2019-11-12 PROCEDURE — 97110 THERAPEUTIC EXERCISES: CPT | Performed by: PHYSICAL THERAPIST

## 2019-11-12 PROCEDURE — 97112 NEUROMUSCULAR REEDUCATION: CPT | Performed by: PHYSICAL THERAPIST

## 2019-11-12 NOTE — PROGRESS NOTES
Physical Therapy Daily Progress Note    Visit #: 52  Juli Luna reports: No new complaints.   Pain Scale (0-10):     Subjective       Objective   See Exercise, Manual, and Modality Logs for complete treatment.     PROCEDURES AND MODALITIES:  Paraffin:   pre-rx  Moist Heat:    Ice:   post-rx  E-Stim:    Ultrasound:    Ionto:   Traction:    Dry Needling:         Therapy Exercise 67759 20 minutes and NMR 30007 23 minutes     Timed Code Treatment: 43 Minutes  Total Treatment Time: 43 Minutes    Assessment/Plan  Patient is making good progress with balancing during tandem walking in firm and foam surfaces. SL stability is improving as we were able to lower the target for SL balance with functional reach.     Progress per Plan of Care      Esperanza Oneil PT, DPT, CHT  Physical Therapist  [place] License # [id number]

## 2019-11-14 ENCOUNTER — TREATMENT (OUTPATIENT)
Dept: PHYSICAL THERAPY | Facility: CLINIC | Age: 24
End: 2019-11-14

## 2019-11-14 DIAGNOSIS — R26.89 IMBALANCE: Primary | ICD-10-CM

## 2019-11-14 DIAGNOSIS — Z86.79 HISTORY OF INTRACRANIAL HEMORRHAGE: ICD-10-CM

## 2019-11-14 PROCEDURE — 97112 NEUROMUSCULAR REEDUCATION: CPT | Performed by: PHYSICAL THERAPIST

## 2019-11-14 PROCEDURE — 97110 THERAPEUTIC EXERCISES: CPT | Performed by: PHYSICAL THERAPIST

## 2019-11-14 NOTE — PROGRESS NOTES
Physical Therapy Daily Progress Note    Visit #: 53  Juli Luna reports: Nothing new.   Pain Scale (0-10):     Subjective       Objective   See Exercise, Manual, and Modality Logs for complete treatment.     PROCEDURES AND MODALITIES:  Paraffin:   pre-rx  Moist Heat:    Ice:   post-rx  E-Stim:    Ultrasound:    Ionto:   Traction:    Dry Needling:         Therapy Exercise 88961 18 minutes and NMR 06244 23 minutes     Timed Code Treatment: 41 Minutes  Total Treatment Time: 41 Minutes    Assessment/Plan  Patient continues to make progress, although it is slow. She needs further strengthening of her LEs and core to maximize function.   Progress strengthening /stabilization /functional activity      Esperanza Oneil, PT, DPT, CHT  Physical Therapist  [place] License # [id number]

## 2019-11-19 ENCOUNTER — TREATMENT (OUTPATIENT)
Dept: PHYSICAL THERAPY | Facility: CLINIC | Age: 24
End: 2019-11-19

## 2019-11-19 DIAGNOSIS — Z86.79 HISTORY OF INTRACRANIAL HEMORRHAGE: ICD-10-CM

## 2019-11-19 DIAGNOSIS — R26.89 IMBALANCE: Primary | ICD-10-CM

## 2019-11-19 PROCEDURE — 97110 THERAPEUTIC EXERCISES: CPT | Performed by: PHYSICAL THERAPIST

## 2019-11-19 PROCEDURE — 97112 NEUROMUSCULAR REEDUCATION: CPT | Performed by: PHYSICAL THERAPIST

## 2019-11-19 NOTE — PROGRESS NOTES
Recertification    Name: Juli Luna  Date: 11/19/2019  Diagnosis/ICD-10 Code:  Imbalance [R26.89]  Referring practitioner: Self Referring  Date of Initial Visit:   Visit #: 54  Subjective:   Juli Luna reports: I am able to run without falling, which I could not do before. I was at a concert and when they turned out the lights I fell going down the stairs. There was not a railing to hold onto.   Subjective Questionnaire: ABC: 97% disability  Clinical Progress: improved  Home Program Compliance: Yes  Treatment has included: therapeutic exercise and neuromuscular re-education  Objective:   Objective   SLS: R LE firm surface: 7sec, L LE firm surface: 17 sec  Computerized Dynamic Posturography  CDP: Sensory Organization Test  Composite Equilibrium Score: 72, WNL  Sensory Analysis WDL: Exceptions to WDL  Sensory Analysis: Preference  Strategy Analysis: Ankle Dominant    Assessment:   Functional Limitations: Walking, Difficulty moving, Decreased ability to perform ADL's  Patient continues to be vision dominant when it comes to use of balance systems. She has improved and is more functional with less falls. She needs further core strengthening.   Plan:   PT Interventions: Therapeutic exercise - strengthening, Balance Training, Home Exercise Program  Progress toward previous goals: Partially Met  Recommendations: Continue as planned  Timeframe: 1 month  Prognosis to achieve goals: good    11/19/2019 Treatments:     Therapy Exercise 72771 15 minutes and NMR 87737 30 minutes with Neurocom testing and custom training    Timed Code Treatment: 45   Minutes     Total Treatment Time: 45      Minutes      PT Signature: Esperanza Oneil PT, DPT, CHT [place] license #[id number]  [place] License #: [id number]    Based upon review of the patient's progress and continued therapy plan, it is my medical opinion that Juli Luna should continue physical therapy treatment at [facility] PHYSICAL THERAPY  [address] [place] [address]  [address] [place] [address] [phone].    Signature:  Referring, Self

## 2019-11-26 ENCOUNTER — TREATMENT (OUTPATIENT)
Dept: PHYSICAL THERAPY | Facility: CLINIC | Age: 24
End: 2019-11-26

## 2019-11-26 DIAGNOSIS — R26.89 IMBALANCE: Primary | ICD-10-CM

## 2019-11-26 DIAGNOSIS — Z86.79 HISTORY OF INTRACRANIAL HEMORRHAGE: ICD-10-CM

## 2019-11-26 PROCEDURE — 97112 NEUROMUSCULAR REEDUCATION: CPT | Performed by: PHYSICAL THERAPIST

## 2019-11-26 PROCEDURE — 97110 THERAPEUTIC EXERCISES: CPT | Performed by: PHYSICAL THERAPIST

## 2019-12-03 ENCOUNTER — TREATMENT (OUTPATIENT)
Dept: PHYSICAL THERAPY | Facility: CLINIC | Age: 24
End: 2019-12-03

## 2019-12-03 DIAGNOSIS — R26.89 IMBALANCE: Primary | ICD-10-CM

## 2019-12-03 DIAGNOSIS — Z86.79 HISTORY OF INTRACRANIAL HEMORRHAGE: ICD-10-CM

## 2019-12-03 PROCEDURE — 97110 THERAPEUTIC EXERCISES: CPT | Performed by: PHYSICAL THERAPIST

## 2019-12-03 PROCEDURE — 97112 NEUROMUSCULAR REEDUCATION: CPT | Performed by: PHYSICAL THERAPIST

## 2019-12-03 NOTE — PROGRESS NOTES
Physical Therapy Daily Progress Note    Visit #: 56  Juli Luna reports: No new complaints.   Pain Scale (0-10):     Subjective       Objective   See Exercise, Manual, and Modality Logs for complete treatment.   See NeuroCom custom training report.     PROCEDURES AND MODALITIES:  Paraffin:   pre-rx  Moist Heat:    Ice:   post-rx  E-Stim:    Ultrasound:    Ionto:   Traction:    Dry Needling:         Therapy Exercise 84397 28 minutes and NMR 97905 32 minutes     Timed Code Treatment: 60 Minutes  Total Treatment Time: 60 Minutes    Assessment/Plan  Patient's coordination has improved as evidenced by ability to stand FT on foam and toss ball. She is dropping the ball much less often. Improved performance on Neurocom custom training today.   Progress per Plan of Care      Esperanza Oneil PT, DPT, CHT  Physical Therapist  [place] License # [id number]

## 2019-12-05 ENCOUNTER — TREATMENT (OUTPATIENT)
Dept: PHYSICAL THERAPY | Facility: CLINIC | Age: 24
End: 2019-12-05

## 2019-12-05 DIAGNOSIS — R26.89 IMBALANCE: Primary | ICD-10-CM

## 2019-12-05 DIAGNOSIS — Z86.79 HISTORY OF INTRACRANIAL HEMORRHAGE: ICD-10-CM

## 2019-12-05 PROCEDURE — 97112 NEUROMUSCULAR REEDUCATION: CPT | Performed by: PHYSICAL THERAPIST

## 2019-12-05 PROCEDURE — 97110 THERAPEUTIC EXERCISES: CPT | Performed by: PHYSICAL THERAPIST

## 2019-12-05 NOTE — PROGRESS NOTES
Physical Therapy Daily Progress Note    Visit #: 57  Juli Luna reports: My insurance will change at the beginning of the year so I would like to get as many visits in before that.   Pain Scale (0-10):     Subjective       Objective   See Exercise, Manual, and Modality Logs for complete treatment.   See NeuroCom custom training report.      PROCEDURES AND MODALITIES:  Paraffin:   pre-rx  Moist Heat:    Ice:   post-rx  E-Stim:    Ultrasound:    Ionto:   Traction:    Dry Needling:         Therapy Exercise 24606 22 minutes and NMR 70748 30 minutes     Timed Code Treatment: 52 Minutes  Total Treatment Time: 52 Minutes    Assessment/Plan  Patient demonstrated improvement in performance on NEUROCOM custom training. She has poor strategies to right herself if COG is shifted posteriorly.     Progress per Plan of Care      Esperanza Oneil, PT, DPT, CHT  Physical Therapist  [place] License # [id number]

## 2019-12-19 ENCOUNTER — TREATMENT (OUTPATIENT)
Dept: PHYSICAL THERAPY | Facility: CLINIC | Age: 24
End: 2019-12-19

## 2019-12-19 DIAGNOSIS — R26.89 IMBALANCE: Primary | ICD-10-CM

## 2019-12-19 DIAGNOSIS — Z86.79 HISTORY OF INTRACRANIAL HEMORRHAGE: ICD-10-CM

## 2019-12-19 PROCEDURE — 97110 THERAPEUTIC EXERCISES: CPT | Performed by: PHYSICAL THERAPIST

## 2019-12-19 PROCEDURE — 97112 NEUROMUSCULAR REEDUCATION: CPT | Performed by: PHYSICAL THERAPIST

## 2019-12-19 PROCEDURE — 95992 CANALITH REPOSITIONING PROC: CPT | Performed by: PHYSICAL THERAPIST

## 2019-12-19 NOTE — PROGRESS NOTES
Recertification    Name: Juli Luna  Date: 12/19/2019  Diagnosis/ICD-10 Code:  Imbalance [R26.89]  Referring practitioner: Self Referring  Date of Initial Visit:   Visit #: 58  Subjective:   Juli Luna reports: I am much better than I was before I started PT. I still feel some imbalance at times when I am trying to move quickly. I now have a gym membership so I will be able to do more exercise there. And my insurance is changing because I am turning 25 this year.   Subjective Questionnaire: ABC: 95% balance confidence  Clinical Progress: unchanged  Home Program Compliance: No  Treatment has included: therapeutic exercise and neuromuscular re-education  Objective:   Objective     Computerized Dynamic Posturography  CDP: Sensory Organization Test  Composite Equilibrium Score: 70, 1% below normal  Sensory Analysis WDL: Exceptions to WDL  Sensory Analysis: Vestibular  Strategy Analysis: Ankle Dominant    Assessment:   Functional Limitations: Walking, Difficulty moving, Decreased ability to perform ADL's  Patient has only attended 4 visits since last reassessment, thus there has been minimal progress. She continues to demonstrate a vestibular dysfunction pattern. She needs further core strengthening to improve stability.   Plan:   PT Interventions: Therapeutic exercise - strengthening, Balance Training, Home Exercise Program  Progress toward previous goals: Partially Met  Recommendations: Continue as planned  Timeframe: 1 month  Prognosis to achieve goals: good    12/19/2019 Treatments:     Therapy Exercise 25396 22 minutes and NMR 83578 35 minutes    Timed Code Treatment: 57   Minutes     Total Treatment Time: 60     Minutes      PT Signature: Esperanza Oneil PT, DPT, CHT [place] license #[id number]  [place] License #: [id number]    Based upon review of the patient's progress and continued therapy plan, it is my medical opinion that Juli Luna should continue physical therapy treatment at [facility] PHYSICAL  THERAPY  [address] [facility], [address] [address] [phone].    Signature:  Referring, Self

## 2019-12-19 NOTE — PROGRESS NOTES
Recertification    Name: Juli Luna  Date: 12/19/2019  Diagnosis/ICD-10 Code:  No primary diagnosis found.  Referring practitioner: Self Referring  Date of Initial Visit:   Visit #: Visit count could not be calculated. Make sure you are using a visit which is associated with an episode.  Subjective:   Juli Luna reports: ***  Subjective Questionnaire: {PT subjective questionnaires:74126}  Clinical Progress: {UNCHANGED, IMPROVED, WORSE:29886}  Home Program Compliance: {YES/NA/NO:89726}  Treatment has included: {PT interventions:77024}  Objective:   Objective                            Assessment:        Plan:      Progress toward previous goals: {all/partially/not met:00035}  Recommendations: {Reassess plan:25764}  Timeframe: { timeframe:1365781383}  Prognosis to achieve goals: {GOOD/FAIR/POOR:80985}    12/19/2019 Treatments:     {PT Rx Minutes:78872}    Timed Code Treatment: ***   Minutes     Total Treatment Time: ***      Minutes      PT Signature: Esperanza Oneil, PT, DPT, CHT [place] license #[id number]  [place] License #: [id number]    Based upon review of the patient's progress and continued therapy plan, it is my medical opinion that Juli Luna should continue physical therapy treatment at [facility] PHYSICAL THERAPY  [address] [place] [address] [phone].    Signature:  Referring, Self

## 2019-12-31 ENCOUNTER — TREATMENT (OUTPATIENT)
Dept: PHYSICAL THERAPY | Facility: CLINIC | Age: 24
End: 2019-12-31

## 2019-12-31 DIAGNOSIS — R26.89 IMBALANCE: Primary | ICD-10-CM

## 2019-12-31 DIAGNOSIS — Z86.79 HISTORY OF INTRACRANIAL HEMORRHAGE: ICD-10-CM

## 2019-12-31 PROCEDURE — 97110 THERAPEUTIC EXERCISES: CPT | Performed by: PHYSICAL THERAPIST

## 2019-12-31 PROCEDURE — 97112 NEUROMUSCULAR REEDUCATION: CPT | Performed by: PHYSICAL THERAPIST

## 2019-12-31 NOTE — PROGRESS NOTES
Physical Therapy Daily Progress Note    Visit #: 59  Juli Luna reports: I have been sick with the flu so I am not feeling too great today.   Pain Scale (0-10):     Subjective       Objective   See Exercise, Manual, and Modality Logs for complete treatment.   See NeuroCom custom training report.      PROCEDURES AND MODALITIES:  Paraffin:   pre-rx  Moist Heat:    Ice:   post-rx  E-Stim:    Ultrasound:    Ionto:   Traction:    Dry Needling:         Therapy Exercise 07006 21 minutes and NMR 25853 23 minutes     Timed Code Treatment: 44 Minutes  Total Treatment Time: 45 Minutes    Assessment/Plan  Patient continues to have less stability and balance when standing on R LE. She needs further core strengthening to improve LE control.     Progress strengthening /stabilization /functional activity      Esperanza Oneil, PT, DPT, CHT  Physical Therapist  [place] License # [id number]

## 2020-01-03 ENCOUNTER — TREATMENT (OUTPATIENT)
Dept: PHYSICAL THERAPY | Facility: CLINIC | Age: 25
End: 2020-01-03

## 2020-01-03 DIAGNOSIS — Z86.79 HISTORY OF INTRACRANIAL HEMORRHAGE: ICD-10-CM

## 2020-01-03 DIAGNOSIS — R26.89 IMBALANCE: Primary | ICD-10-CM

## 2020-01-03 PROCEDURE — 97110 THERAPEUTIC EXERCISES: CPT | Performed by: PHYSICAL THERAPIST

## 2020-01-03 PROCEDURE — 97112 NEUROMUSCULAR REEDUCATION: CPT | Performed by: PHYSICAL THERAPIST

## 2020-01-03 NOTE — PROGRESS NOTES
Physical Therapy Daily Progress Note    Visit #: 60  Juli Luna reports: I am doing well and agree with the plan to hold off on PT for a while.   Pain Scale (0-10):     Subjective       Objective   See Exercise, Manual, and Modality Logs for complete treatment.   See NeuroCom custom training report.      PROCEDURES AND MODALITIES:  Paraffin:   pre-rx  Moist Heat:    Ice:   post-rx  E-Stim:    Ultrasound:    Ionto:   Traction:    Dry Needling:         Therapy Exercise 77013 26 minutes and NMR 82234 27 minutes     Timed Code Treatment: 53 Minutes  Total Treatment Time: 53 Minutes    Assessment/Plan  Given patient's recent change in insurance will DC from PT for now as she is doing well. May need to revisit PT in a few months if needed. At last recert dated 12/19/19 patient had 95% balance confidence on the ABC and was testing WNL on CDP balance testing.   New LTG: in 4 weeks  1. Patient demonstrates a normal SOT composite equilibrium score of at least 75. NOT MET  2. Pt perform SLS on R for at least 10 seconds. NOT MET  Other DC to HEP      Esperanza Oneil, PT, DPT, CHT  Physical Therapist  [place] License # [id number]

## 2020-10-08 NOTE — PROGRESS NOTES
Physical Therapy Daily Progress Note    Visit #: 8  Juli Luna reports: R hand hurting this morning but now it feels fine.   Pain Scale (0-10):     Subjective       Objective   See Exercise, Manual, and Modality Logs for complete treatment.     PROCEDURES AND MODALITIES:  Paraffin:   pre-rx  Moist Heat:    Ice:   post-rx  E-Stim:    Ultrasound: Rx Minutes: 7/9 w/setup  Ionto:   Traction:    Dry Needling:         Therapy Exercise 76394 15 minutes and NMR 69176 25 minutes     Timed Code Treatment: 49 Minutes  Total Treatment Time: 50 Minutes    Assessment/Plan  Patient is demonstrating improved SL balance on L and lesser on R. Demonstrating improved vestibular function as well as evidenced by improved ability to stand with EC on foam surface.     Progress strengthening /stabilization /functional activity  RECERT NEXT VISIT      Esperanza Oneil PT, DPT, CHT  Physical Therapist  [place] License # [id number]       no

## 2020-10-28 ENCOUNTER — TELEPHONE (OUTPATIENT)
Dept: NEUROSURGERY | Facility: CLINIC | Age: 25
End: 2020-10-28

## 2020-10-28 NOTE — TELEPHONE ENCOUNTER
Patient's mother (Marissa) called to report that patient is now on [employer] and she is limited on visits for PT. She was told that she needed a visit and new orders from Physician in order for PT services to continue and be covered. She was last in the office with Dr. Regalado 4/2019. I offered to schedule an appt w/Dr. Anne. Marissa stated that she will have patient call back to schedule.

## 2020-11-16 ENCOUNTER — TELEPHONE (OUTPATIENT)
Dept: NEUROSURGERY | Facility: CLINIC | Age: 25
End: 2020-11-16

## 2020-11-16 NOTE — TELEPHONE ENCOUNTER
THE PATIENT'S MOTHER NICK CALLED ABOUT SCHEDULING AN APPOINTMENT FOR THE PATIENT. SHE WAS A PREVIOUS PATIENT OF DR. CONTRERAS, AND THE TELEPHONE ENCOUNTER FROM 10/28/20 SAYS THAT SHE COULD BE SCHEDULED WITH DR. HERZOG.    THE PATIENT IS AT WORK AND CAN'T BE CALLED DURING THE DAY. NICK WILL BE TRYING TO GET THE PATIENT'S WORK SCHEDULE AND CALLING BACK TO SCHEDULE FOR HER TOMORROW. NICK CAN BE REACHED [phone]     THANK YOU!

## 2021-01-07 NOTE — PROGRESS NOTES
Subjective   History of Present Illness: Juli Luna is a 25 y.o. 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 She is a former pt of Dr. Regalado last seen 4/2019 and advised to f/u in 5 years with new MRI.  Most recent MRI Brain 4/2019.  Today he reports that she has some residual right-sided weakness and sensation loss.  She reports that her voice has been different since her procedure.  She reports that she has some instability while walking.  She has tried physical therapy in the past and it is helped.  Her last PT appointment was approximately a year ago.  She thinks her gait instability has gotten worse since she stopped physical therapy.  He is requesting to go back to physical therapy today.  She denies any headaches.  Denies any double vision.  Denies any nausea vomiting.  Denies any left-sided weakness    History of Present Illness    The following portions of the patient's history were reviewed and updated as appropriate: allergies, past family history, past medical history, past social history, past surgical history and problem list.    Past Medical History:   Diagnosis Date   • AVM (arteriovenous malformation)    • History of pneumonia         Past Surgical History:   Procedure Laterality Date   • CRANIOTOMY FOR TUMOR Left 9/7/2016    Procedure: Posterior fossa suboccipital craniotomy and removal of brain stem arteriovenous malformation;  Surgeon: Josias Regalado MD;  Location: [facility] OR;  Service:    • ENDOSCOPY W/ PEG TUBE PLACEMENT N/A 9/3/2016    Procedure: ESOPHAGOGASTRODUODENOSCOPY WITH PERCUTANEOUS ENDOSCOPIC GASTROSTOMY TUBE INSERTION;  Surgeon: René Ritter MD;  Location: [facility] ENDOSCOPY;  Service:    • TRACHEOSTOMY N/A 9/8/2016    Procedure: TRACHEOSTOMY;  Surgeon: Chad Negron MD;  Location: [facility] OR;  Service:           Current Outpatient Medications:   •  acetaminophen (TYLENOL) 500 MG tablet,  Take 500 mg by mouth Every 6 (Six) Hours As Needed for Mild Pain ., Disp: , Rfl:   •  Patricia 24 FE 1-20 MG-MCG(24) per tablet, Take 1 tablet by mouth Daily., Disp: , Rfl:   •  Loratadine-Pseudoephedrine (CLARITIN-D 24 HOUR PO), Take  by mouth As Needed., Disp: , Rfl:   •  multivitamin with minerals (WOMENS DAILY FORMULA PO), Take 1 tablet by mouth Daily., Disp: , Rfl:   •  spironolactone (ALDACTONE) 50 MG tablet, Take 2 tablets by mouth Daily., Disp: , Rfl:   •  vitamin B-12 (CYANOCOBALAMIN) 1000 MCG tablet, Take 1,000 mcg by mouth Daily., Disp: , Rfl:      Social History     Socioeconomic History   • Marital status: Single     Spouse name: Not on file   • Number of children: Not on file   • Years of education: Not on file   • Highest education level: Not on file   Occupational History   • Occupation: part-time retail/student   Tobacco Use   • Smoking status: Never Smoker   • Smokeless tobacco: Never Used   Substance and Sexual Activity   • Alcohol use: Yes     Alcohol/week: 1.0 standard drinks     Types: 1 Glasses of wine per week   • Drug use: No   • Sexual activity: Defer        Family History   Problem Relation Age of Onset   • Diabetes Mother    • Hypertension Mother    • Heart disease Maternal Uncle    • Heart disease Maternal Grandmother    • Hypertension Maternal Grandmother    • Heart disease Maternal Grandfather    • Diabetes Maternal Grandfather    • Diabetes Other    • Hypertension Other         Review of Systems   Constitutional: Negative for chills and fever.   Eyes: Negative for visual disturbance.   Respiratory: Negative for cough and shortness of breath.    Genitourinary: Negative for difficulty urinating and frequency.        - bowel or bladder incontinence   Musculoskeletal: Positive for gait problem (Instabililty; denies falls but reports that she occasionally trips or misses a step).   Neurological: Positive for speech difficulty, weakness (R hand/fingers) and numbness (N/T bilaterally in hands).  "Negative for dizziness and light-headedness.   Psychiatric/Behavioral: Negative for confusion and decreased concentration.       Objective     Vitals:    21 1006   BP: 134/82   Pulse: 98   SpO2: 99%   Weight: 77.1 kg (170 lb)   Height: 156.2 cm (61.5\")     Body mass index is 31.6 kg/m².      Physical Exam  Neurologic Exam  Awake, alert, oriented x3  Pupils equal round reactive to light  Extraocular muscles intact  Face symmetric  Speech is fluent and clear  No pronator drift  Motor exam  Bilateral deltoids 5/5, bilateral biceps 5/5, bilateral triceps 5/5, bilateral wrist extension 5/5 bilateral hand  5/5  Bilateral hip flexion 5/5, bilateral knee extension 5/5, bilateral DF/PF 5/5  2 beat clonus on right  As of Dickson's on the right  2+ bilateral patellar reflexes  2+ bilateral biceps reflex  Steady normal gait  Able to walk heel-to-toe with mild to moderate difficulty  Able to detect  light touch in all 4 extremities        Assessment/Plan   Independent Review of Radiographic Studies:      I personally reviewed the images from the following studies.  No new imaging studies    Medical Decision Makin-year-old female that is post ileotomy for resection of brainstem cavernous malformation in 2016 after multiple hemorrhages  -She is made significant recovery since that time and now is walking independently with good strength in both hands.  She still has some residual gait instability and right arm numbness.  She reports she has some clumsiness in her right hand  -She was seeing physical therapy which has helped with her gait instability however was last seen by physical therapy 1 year ago.  She reports that her gait instability is getting worse again and would like to see physical therapy again.   -We will plan for follow-up MRI to evaluate for any residual cavernous malformation or hemorrhage.   -We will plan to follow-up in 1 month with a telephone visit to discuss the results of the " MRI    Diagnoses and all orders for this visit:    1. Cavernous malformation (Primary)  -     MRI Brain With & Without Contrast; Future    2. 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

## 2021-01-11 ENCOUNTER — OFFICE VISIT (OUTPATIENT)
Dept: NEUROSURGERY | Facility: CLINIC | Age: 26
End: 2021-01-11

## 2021-01-11 VITALS
HEIGHT: 62 IN | OXYGEN SATURATION: 99 % | DIASTOLIC BLOOD PRESSURE: 82 MMHG | HEART RATE: 98 BPM | WEIGHT: 170 LBS | SYSTOLIC BLOOD PRESSURE: 134 MMHG | BODY MASS INDEX: 31.28 KG/M2

## 2021-01-11 DIAGNOSIS — R26.9 ABNORMALITY OF GAIT: ICD-10-CM

## 2021-01-11 DIAGNOSIS — Q28.3 CAVERNOUS MALFORMATION: Primary | ICD-10-CM

## 2021-01-11 DIAGNOSIS — I61.3 LEFT-SIDED NONTRAUMATIC INTRACEREBRAL HEMORRHAGE OF BRAINSTEM (HCC): ICD-10-CM

## 2021-01-11 PROCEDURE — 99213 OFFICE O/P EST LOW 20 MIN: CPT | Performed by: NEUROLOGICAL SURGERY

## 2021-01-11 RX ORDER — NORETHINDRONE ACETATE/ETHINYL ESTRADIOL AND FERROUS FUMARATE 1MG-20(24)
1 KIT ORAL DAILY
COMMUNITY
Start: 2020-10-29

## 2021-01-11 RX ORDER — LANOLIN ALCOHOL/MO/W.PET/CERES
1000 CREAM (GRAM) TOPICAL DAILY
COMMUNITY

## 2021-01-11 RX ORDER — MULTIPLE VITAMINS W/ MINERALS TAB 9MG-400MCG
1 TAB ORAL DAILY
COMMUNITY

## 2021-01-11 RX ORDER — ACETAMINOPHEN 500 MG
500 TABLET ORAL EVERY 6 HOURS PRN
COMMUNITY

## 2021-01-18 ENCOUNTER — TELEPHONE (OUTPATIENT)
Dept: NEUROSURGERY | Facility: CLINIC | Age: 26
End: 2021-01-18

## 2021-01-18 NOTE — TELEPHONE ENCOUNTER
Caller: Juli Luna    Relationship: Self    Best call back number: [phone]    What orders are you requesting (i.e. lab or imaging): MRI BRAIN    In what timeframe would the patient need to come in: 02/10/21     Where will you receive your lab/imaging services: [facility]    Additional notes: PATIENT ALREADY SCHEDULED FOR MRI & TELEVISIT BUT INQUIRING IF PROVIDER COULD EVALUATE MRI RESULTS FOR ANY ABNORMALITIES AFFECTING RIGHT EYE? PATIENT UNSURE IF MRI WILL NEED TO BE ADJUSTED TO ACCOMMODATE THAT. UNABLE TO ASSIST DUE TO IT BEING A TODNEM PATIENT. PATIENT ALSO REQUESTING TO SWITCH TELEVSIT ON 2/10/21 TO IN OFFICE APPT INSTEAD, PREFERABLY THE EARLIEST TIME AVAILABLE.    PATIENT CAN BE CONTACTED WITH AN UPDATE.

## 2021-01-18 NOTE — TELEPHONE ENCOUNTER
Pt scheduled for MRI Brain 2/8/21. Below is your last note. Please advise.  Office Visit with Gary Anne MD (01/11/2021)

## 2021-01-20 ENCOUNTER — TELEPHONE (OUTPATIENT)
Dept: NEUROSURGERY | Facility: CLINIC | Age: 26
End: 2021-01-20

## 2021-01-20 NOTE — TELEPHONE ENCOUNTER
Patient notified of Dr. Anne's response. She reported that she is not having any new visual problems. She requested to move appt to in person visit on a different day and early in the morning. Appt has been r/s to 2/22/21 @ 8:30am. Patient aware.

## 2021-01-20 NOTE — TELEPHONE ENCOUNTER
Yes.  We should be able to see her right eye from this MRI.  If she is having new visual problems she also may need to follow-up with her ophthalmologist.

## 2021-01-20 NOTE — TELEPHONE ENCOUNTER
Patient called. She is scheduled for a MRI on 02-08-21. She needs something called in because she is claustrophobic.     She uses the [facility] that is listed in her chart.

## 2021-02-04 RX ORDER — DIAZEPAM 5 MG/1
TABLET ORAL
Qty: 1 TABLET | Refills: 0 | Status: SHIPPED | OUTPATIENT
Start: 2021-02-04 | End: 2021-02-24

## 2021-02-08 ENCOUNTER — APPOINTMENT (OUTPATIENT)
Dept: MRI IMAGING | Facility: HOSPITAL | Age: 26
End: 2021-02-08

## 2021-02-11 ENCOUNTER — APPOINTMENT (OUTPATIENT)
Dept: MRI IMAGING | Facility: HOSPITAL | Age: 26
End: 2021-02-11

## 2021-02-20 ENCOUNTER — HOSPITAL ENCOUNTER (OUTPATIENT)
Dept: MRI IMAGING | Facility: HOSPITAL | Age: 26
Discharge: HOME | End: 2021-02-20
Admitting: NEUROLOGICAL SURGERY

## 2021-02-20 DIAGNOSIS — I61.3 LEFT-SIDED NONTRAUMATIC INTRACEREBRAL HEMORRHAGE OF BRAINSTEM (HCC): ICD-10-CM

## 2021-02-20 DIAGNOSIS — Q28.3 CAVERNOUS MALFORMATION: ICD-10-CM

## 2021-02-20 PROCEDURE — 0 GADOBENATE DIMEGLUMINE 529 MG/ML SOLUTION: Performed by: NEUROLOGICAL SURGERY

## 2021-02-20 PROCEDURE — A9577 INJ MULTIHANCE: HCPCS | Performed by: NEUROLOGICAL SURGERY

## 2021-02-20 PROCEDURE — 70553 MRI BRAIN STEM W/O & W/DYE: CPT

## 2021-02-20 RX ADMIN — GADOBENATE DIMEGLUMINE 15 ML: 529 INJECTION, SOLUTION INTRAVENOUS at 11:14

## 2021-02-24 ENCOUNTER — OFFICE VISIT (OUTPATIENT)
Dept: NEUROSURGERY | Facility: CLINIC | Age: 26
End: 2021-02-24

## 2021-02-24 VITALS
WEIGHT: 170.8 LBS | BODY MASS INDEX: 31.75 KG/M2 | HEART RATE: 80 BPM | SYSTOLIC BLOOD PRESSURE: 110 MMHG | DIASTOLIC BLOOD PRESSURE: 72 MMHG

## 2021-02-24 DIAGNOSIS — Q28.3 CAVERNOUS MALFORMATION: Primary | ICD-10-CM

## 2021-02-24 DIAGNOSIS — I61.3 LEFT-SIDED NONTRAUMATIC INTRACEREBRAL HEMORRHAGE OF BRAINSTEM (HCC): ICD-10-CM

## 2021-02-24 DIAGNOSIS — R26.9 ABNORMALITY OF GAIT: ICD-10-CM

## 2021-02-24 PROCEDURE — 99214 OFFICE O/P EST MOD 30 MIN: CPT | Performed by: NEUROLOGICAL SURGERY

## 2021-02-24 NOTE — PROGRESS NOTES
Subjective   History of Present Illness: Juli Luna is a 25 y.o. female is here today for follow-up after MRI Brain 2/20/21.  Today she reports that she is doing well.  Denies any new symptoms.  Last time I had seen her she reported some gait instability while walking long distances.  She reports occasional weakness in her right arm and leg.  This is not significantly changed over the past year.  She is hoping to start physical therapy again to improve her gait instability.  She has not yet restarted physical therapy.  She denies any changes in headaches.  He reports that she has a history of wearing corrective lenses and needs to get her eyes checked again because she reports some blurry vision.     History of Present Illness    The following portions of the patient's history were reviewed and updated as appropriate: allergies, current medications, past medical history, past social history, past surgical history and problem list.    Past Medical History:   Diagnosis Date   • AVM (arteriovenous malformation)    • History of pneumonia         Past Surgical History:   Procedure Laterality Date   • CRANIOTOMY FOR TUMOR Left 9/7/2016    Procedure: Posterior fossa suboccipital craniotomy and removal of brain stem arteriovenous malformation;  Surgeon: Josias Regalado MD;  Location: [facility];  Service:    • ENDOSCOPY W/ PEG TUBE PLACEMENT N/A 9/3/2016    Procedure: ESOPHAGOGASTRODUODENOSCOPY WITH PERCUTANEOUS ENDOSCOPIC GASTROSTOMY TUBE INSERTION;  Surgeon: René Ritter MD;  Location: [facility] ENDOSCOPY;  Service:    • TRACHEOSTOMY N/A 9/8/2016    Procedure: TRACHEOSTOMY;  Surgeon: Chad Negron MD;  Location: [facility] OR;  Service:           Current Outpatient Medications:   •  acetaminophen (TYLENOL) 500 MG tablet, Take 500 mg by mouth Every 6 (Six) Hours As Needed for Mild Pain ., Disp: , Rfl:   •  Patricia 24 FE 1-20 MG-MCG(24) per tablet, Take 1 tablet by mouth Daily., Disp: , Rfl:   •   Loratadine-Pseudoephedrine (CLARITIN-D 24 HOUR PO), Take  by mouth As Needed., Disp: , Rfl:   •  multivitamin with minerals (WOMENS DAILY FORMULA PO), Take 1 tablet by mouth Daily., Disp: , Rfl:   •  spironolactone (ALDACTONE) 50 MG tablet, Take 2 tablets by mouth Daily., Disp: , Rfl:   •  vitamin B-12 (CYANOCOBALAMIN) 1000 MCG tablet, Take 1,000 mcg by mouth Daily., Disp: , Rfl:      Social History     Socioeconomic History   • Marital status: Single     Spouse name: Not on file   • Number of children: Not on file   • Years of education: Not on file   • Highest education level: Not on file   Occupational History   • Occupation: part-time retail/student   Tobacco Use   • Smoking status: Never Smoker   • Smokeless tobacco: Never Used   Substance and Sexual Activity   • Alcohol use: Yes     Alcohol/week: 1.0 standard drinks     Types: 1 Glasses of wine per week   • Drug use: No   • Sexual activity: Defer        Family History   Problem Relation Age of Onset   • Diabetes Mother    • Hypertension Mother    • Heart disease Maternal Uncle    • Heart disease Maternal Grandmother    • Hypertension Maternal Grandmother    • Heart disease Maternal Grandfather    • Diabetes Maternal Grandfather    • Diabetes Other    • Hypertension Other         Review of Systems   Constitutional: Negative for chills and fever.   Eyes: Negative for visual disturbance.   Respiratory: Negative for cough and shortness of breath.    Genitourinary: Negative for difficulty urinating and enuresis.        Patient denies having any issues with bowel or bladder control.   Musculoskeletal: Positive for gait problem (Instability; Denies falls).   Neurological: Positive for speech difficulty, weakness, numbness (N/T right hand/wrist) and headaches. Negative for dizziness and light-headedness.   Psychiatric/Behavioral: Negative for confusion and decreased concentration.       Objective     Vitals:    02/24/21 1310   BP: 110/72   Pulse: 80   Weight: 77.5 kg  (170 lb 12.8 oz)     Body mass index is 31.75 kg/m².      Physical Exam  Neurologic Exam  Awake, alert, oriented x3  Pupils equal round reactive to light  Extraocular muscles intact  Face symmetric  Speech is fluent and clear  No pronator drift  Motor exam  Bilateral deltoids 5/5, bilateral biceps 5/5, bilateral triceps 5/5, bilateral wrist extension 5/5 bilateral hand  5/5  Bilateral hip flexion 5/5, bilateral knee extension 5/5, bilateral DF/PF 5/5  2 beat clonus at right ankle  No Dicksno's reflex  2+ bilateral patellar reflexes  2+ bilateral biceps reflex  Steady normal gait  Able to walk heel-to-toe without difficulty  Able to detect  light touch in all 4 extremities        Assessment/Plan   Independent Review of Radiographic Studies:      I personally reviewed the images from the following studies.  MRI brain with and without contrast from 2021 and 2019  The MRI images show evidence of previous suboccipital craniectomy with Telovelar approach for resection of left medullary hemorrhage from suspected cavernous malformation.  There is no evidence of subsequent hydrocephalus or development of any new lesions.     Medical Decision Makin-year-old female status post suboccipital craniotomy for Telovelar approach for evacuation of left medullary hemorrhage and suspected cavernous malformation  -Her repeat MRI shows no definitive evidence of residual or new lesion.  There is no evidence of infarct or hydrocephalus  -She reports that she has some residual gait instability and occasional right arm weakness at times.  She denies any sudden worsening of the symptoms.  I have recommended that she continue physical therapy to improve her gait stability.  I have also recommended that she look into [facility] as a another alternative for improving balance.   -We will plan to have her follow-up in 1 year for repeat MRI  Diagnoses and all orders for this visit:    1. Cavernous malformation  (Primary)  -     MRI Brain With & Without Contrast; Future    2. Abnormality of gait  -     MRI Brain With & Without Contrast; Future    3. 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

## 2021-04-16 ENCOUNTER — BULK ORDERING (OUTPATIENT)
Dept: CASE MANAGEMENT | Facility: OTHER | Age: 26
End: 2021-04-16

## 2021-04-16 DIAGNOSIS — Z23 IMMUNIZATION DUE: ICD-10-CM

## 2022-01-20 ENCOUNTER — TELEPHONE (OUTPATIENT)
Dept: NEUROSURGERY | Facility: CLINIC | Age: 27
End: 2022-01-20

## 2022-01-20 NOTE — TELEPHONE ENCOUNTER
PATIENT CALLED REQUESTING A NEW PT ORDER.    PATIENT WAS WORKING AND JUST NOW CAN FIT PT IN    PLEASE ADVISE

## 2022-02-07 ENCOUNTER — TELEPHONE (OUTPATIENT)
Dept: NEUROSURGERY | Facility: CLINIC | Age: 27
End: 2022-02-07

## 2022-02-07 DIAGNOSIS — Q28.3 CAVERNOUS MALFORMATION: Primary | ICD-10-CM

## 2022-02-07 NOTE — TELEPHONE ENCOUNTER
LM for patient that her appt with Dr. Anne has been moved to March 9 at 10:15. Also reminded her to have her MRI scheduled prior to her appt and gave scheduling phone number on voicemail.
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
## 2022-02-09 ENCOUNTER — TELEPHONE (OUTPATIENT)
Dept: NEUROSURGERY | Facility: CLINIC | Age: 27
End: 2022-02-09

## 2022-02-09 NOTE — TELEPHONE ENCOUNTER
I let her know I spoke to Dr. Anne and I tried to have her MRI moved up sooner however there are no available MRI appointments in [place] or [place]. I told her if she has increased HA's that are a 10 she should go to the ER to be evaluated until she can be seen in office.
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
## 2022-03-01 ENCOUNTER — TELEPHONE (OUTPATIENT)
Dept: NEUROSURGERY | Facility: CLINIC | Age: 27
End: 2022-03-01

## 2022-03-01 DIAGNOSIS — Q28.2 ARTERIOVENOUS MALFORMATION OF BRAIN: Primary | ICD-10-CM

## 2022-03-01 DIAGNOSIS — Z98.890 H/O CRANIOTOMY: ICD-10-CM

## 2022-03-01 RX ORDER — DIAZEPAM 5 MG/1
5 TABLET ORAL 2 TIMES DAILY PRN
Qty: 1 TABLET | Refills: 0 | Status: CANCELLED | OUTPATIENT
Start: 2022-03-01

## 2022-03-01 NOTE — TELEPHONE ENCOUNTER
Caller: Juli Luna    Relationship: Self    Best call back number:[phone]    What is the best time to reach you:ANYTIME    Who are you requesting to speak with (clinical staff, provider,  specific staff member):CLINICAL STAFF    Do you know the name of the person who called: NA    What was the call regarding:PT CALLED AND IS REQUESTING VALIUM FOR HER MRI-PT STATES SHE CALLED THE PHARMACY AND THEY STATED SHE NEEDED TO CALL OUR OFFICE-PLEASE CONTACT PT AS THE MRI IS SCHEDULED FOR 03/03/22 THANK YOU SO MUCH!    Do you require a callback:YES

## 2022-03-03 ENCOUNTER — TELEPHONE (OUTPATIENT)
Dept: NEUROSURGERY | Facility: CLINIC | Age: 27
End: 2022-03-03

## 2022-03-03 ENCOUNTER — HOSPITAL ENCOUNTER (OUTPATIENT)
Dept: MRI IMAGING | Facility: HOSPITAL | Age: 27
Discharge: HOME | End: 2022-03-03
Admitting: NEUROLOGICAL SURGERY

## 2022-03-03 DIAGNOSIS — Q28.3 CAVERNOUS MALFORMATION: ICD-10-CM

## 2022-03-03 PROCEDURE — 0 GADOBENATE DIMEGLUMINE 529 MG/ML SOLUTION: Performed by: NEUROLOGICAL SURGERY

## 2022-03-03 PROCEDURE — 70553 MRI BRAIN STEM W/O & W/DYE: CPT

## 2022-03-03 PROCEDURE — A9577 INJ MULTIHANCE: HCPCS | Performed by: NEUROLOGICAL SURGERY

## 2022-03-03 RX ADMIN — GADOBENATE DIMEGLUMINE 15 ML: 529 INJECTION, SOLUTION INTRAVENOUS at 18:23

## 2022-03-03 NOTE — TELEPHONE ENCOUNTER
Caller: JACYEUGENIO JAMES    Relationship to patient: SELF    Best call back number: [phone]    Chief complaint:     Type of visit: F/U EXT    Requested date: AS SOON AS POSSIBLE    If rescheduling, when is the original appointment: 03/09/22    Additional notes: PT CALLED AND STATES SHE IS SCHEDULED AN APPT FOR 03/09/22 [facility] AND JUST REALIZED THE APPT IS SCHEDULED FOR 10AM.  PT STATES SHE NEEDS AN APPT AFTER 2:30 BECAUSE OF WORK.        PLEASE CALL PT  THANK YOU

## 2022-03-04 ENCOUNTER — TELEPHONE (OUTPATIENT)
Dept: NEUROSURGERY | Facility: CLINIC | Age: 27
End: 2022-03-04

## 2022-03-04 NOTE — TELEPHONE ENCOUNTER
Called patient back. I am unable to find an appointment time that fits her schedule. I will contact her if someone cancels a late day appointment.

## 2022-03-04 NOTE — TELEPHONE ENCOUNTER
Caller: JACY JAMES     Relationship to patient: SELF    Best call back number: [phone]    Patient is needing: PATIENT IS RETURNING JN CALL, UNABLE TO WARM TRANSFER JN UNAVAILABLE. PLEASE GIVE THE PATIENT A CALL BACK. THANK YOU.
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
## 2022-03-16 ENCOUNTER — OFFICE VISIT (OUTPATIENT)
Dept: NEUROSURGERY | Facility: CLINIC | Age: 27
End: 2022-03-16

## 2022-03-16 ENCOUNTER — TELEPHONE (OUTPATIENT)
Dept: NEUROSURGERY | Facility: CLINIC | Age: 27
End: 2022-03-16

## 2022-03-16 VITALS
TEMPERATURE: 98.6 F | SYSTOLIC BLOOD PRESSURE: 114 MMHG | HEIGHT: 62 IN | WEIGHT: 173.8 LBS | DIASTOLIC BLOOD PRESSURE: 78 MMHG | HEART RATE: 88 BPM | BODY MASS INDEX: 31.98 KG/M2

## 2022-03-16 DIAGNOSIS — Z98.890 H/O CRANIOTOMY: ICD-10-CM

## 2022-03-16 DIAGNOSIS — R47.9 SPEECH DIFFICULT TO UNDERSTAND: ICD-10-CM

## 2022-03-16 DIAGNOSIS — Q28.3 CAVERNOUS MALFORMATION: Primary | ICD-10-CM

## 2022-03-16 DIAGNOSIS — R26.9 ABNORMALITY OF GAIT: ICD-10-CM

## 2022-03-16 PROCEDURE — 99214 OFFICE O/P EST MOD 30 MIN: CPT | Performed by: NEUROLOGICAL SURGERY

## 2022-03-16 NOTE — TELEPHONE ENCOUNTER
LM for patient that her appt today with Dr. Anne will have to be rescheduled due to an emergent surgery. I will call back with a new date and time.

## 2022-03-31 ENCOUNTER — HOSPITAL ENCOUNTER (OUTPATIENT)
Dept: SPEECH THERAPY | Facility: HOSPITAL | Age: 27
Setting detail: THERAPIES SERIES
Discharge: HOME | End: 2022-03-31

## 2022-03-31 DIAGNOSIS — R47.1 DYSARTHRIA: Primary | ICD-10-CM

## 2022-03-31 DIAGNOSIS — Q28.3 CAVERNOUS MALFORMATION: ICD-10-CM

## 2022-03-31 DIAGNOSIS — Z98.890 H/O CRANIOTOMY: ICD-10-CM

## 2022-03-31 DIAGNOSIS — R47.9 SPEECH DIFFICULT TO UNDERSTAND: ICD-10-CM

## 2022-03-31 PROCEDURE — 92522 EVALUATE SPEECH PRODUCTION: CPT

## 2022-04-07 ENCOUNTER — APPOINTMENT (OUTPATIENT)
Dept: SPEECH THERAPY | Facility: HOSPITAL | Age: 27
End: 2022-04-07

## 2022-04-13 ENCOUNTER — TREATMENT (OUTPATIENT)
Dept: PHYSICAL THERAPY | Facility: CLINIC | Age: 27
End: 2022-04-13

## 2022-04-13 DIAGNOSIS — Q28.3 CAVERNOUS MALFORMATION: Primary | ICD-10-CM

## 2022-04-13 DIAGNOSIS — Z98.890 H/O CRANIOTOMY: ICD-10-CM

## 2022-04-13 DIAGNOSIS — R26.9 ABNORMALITY OF GAIT: ICD-10-CM

## 2022-04-13 PROCEDURE — 97530 THERAPEUTIC ACTIVITIES: CPT | Performed by: PHYSICAL THERAPIST

## 2022-04-13 PROCEDURE — 97162 PT EVAL MOD COMPLEX 30 MIN: CPT | Performed by: PHYSICAL THERAPIST

## 2022-04-13 NOTE — PROGRESS NOTES
Physical Therapy Initial Evaluation and Plan of Care        Patient: Juli Luna   : 1995  Visit Diagnoses:     ICD-10-CM ICD-9-CM   1. Cavernous malformation  Q28.3 228.02   2. H/O craniotomy  Z98.890 V45.89   3. Abnormality of gait  R26.9 781.2     Referring practitioner: Gary Anne MD  Date of Initial Visit: 2022  Today's Date: 2022  Patient seen for 1 sessions           Subjective Questionnaire: ABC: 93%      Subjective Evaluation    History of Present Illness  Mechanism of injury: Patient reports difficulty walking and trouble with balance.  States he had improvements with therapy but then had to stop due to insurance issues.      Has difficulty stairs, carrying objects while on stairs.  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      Patient Occupation: ;  at [employer]  Patient goals for therapy: improved balance and increased strength  Patient goal: To be able to navigate multiple flights of steps.  To be able to ride a bike.             Objective          Functional Assessment     Single Leg Stance   Left: 8 seconds  Right: 2 seconds    Comments  UE and LE ROM and strength grossly WNL    [place]/56    DGI:           Assessment & Plan     Assessment  Impairments: abnormal coordination and impaired balance  Functional Limitations: carrying objects  Assessment details: Juli Luna is a pleasant 27 y.o. female that presents with impaired balance and coordination which limits her on stairs, carrying objects while walking (especially on stairs), and is unable to balance to ride a bike. Pt will benefit from skilled PT services in order to address listed impairments and improved balance and coordination.    Prognosis: good  Prognosis details: Patient demonstrates good rehab potential as evidenced by high motivation to  participate with PT POC.      Goals  Plan Goals: Short Term Goals (4 wks):  1.  Patient will have increased SLS time of 20 sec. Bilaterally.  2.  Patient will be able to perform dynamic standing reciprocal UE/LE movement patterns without LOB.  3.  Patient will be able to carry books/folders/bag while navigating stairs without miss steps/LOB.  4.  Complete NeuroCom balance assessment.    Long Term Goals (12 wks):  1.  Patient will have improved DGI score of 24.  2.  Patient will be able to perform dynamic SLS activities without LOB.  3.  Patient will demonstrate improved core strength/trunk coordination to maintain sitting balance on unstable surface.   4.  Patient will be able to use elliptical machine for 5 minutes with good balance and coordination.    Plan  Therapy options: will be seen for skilled therapy services  Planned therapy interventions: neuromuscular re-education, motor coordination training, balance/weight-bearing training, body mechanics training, strengthening, stretching, therapeutic activities, flexibility, gait training and home exercise program  Frequency: 2x week  Duration in weeks: 12  Treatment plan discussed with: patient  Plan details: Pt was educated on the importance of their HEP and their current need for skilled physical therapy. Patients goals and potential limitations were discussed and pt is in agreement with current plan of care and treatment emphasis.                History # of Personal Factors and/or Comorbidities: MODERATE (1-2)  Examination of Body System(s): # of elements: MODERATE (3)  Clinical Presentation: STABLE   Clinical Decision Making: MODERATE      Timed:         Manual Therapy:         mins  72557;     Therapeutic Exercise:         mins  60235;     Neuromuscular [place]:        mins  94469;    Therapeutic Activity:     10     mins  62154;     Gait Training:           mins  48299;     Ultrasound:          mins  68908;    Ionto                                  mins   55290  Self Care                            mins  82009  Canalith Repos         mins  72329  Orthotic MGMT/Train         mins  66161    Un-Timed:  Electrical Stimulation:         mins  97190 ( );  Dry Needling:          mins  28010 self-pay;  Dry Needling:          mins  55280 self-pay  Traction          mins  33584  Low Eval          mins  71233  Mod Eval     40     mins  79352  High Eval                            mins  17908    Timed Treatment:  10    mins   Total Treatment:     50   mins      PT SIGNATURE: Jennie Willard PT     License Number: [id number]  Electronically signed by Jennie Willard PT, 04/13/22, 3:59 PM EDT      DATE TREATMENT INITIATED: 4/13/2022    Initial Certification  Certification Period: 4/13/2022 thru 7/11/2022  I certify that the therapy services are furnished while this patient is under my care.  The services outlined above are required by this patient, and will be reviewed every 90 days.     PHYSICIAN: Gary Anne MD      NPI: [id number]  DATE:         Please sign and return via fax to [phone]. Thank you, [facility].

## 2022-04-14 ENCOUNTER — HOSPITAL ENCOUNTER (OUTPATIENT)
Dept: SPEECH THERAPY | Facility: HOSPITAL | Age: 27
Setting detail: THERAPIES SERIES
Discharge: HOME | End: 2022-04-14

## 2022-04-14 DIAGNOSIS — Z98.890 H/O CRANIOTOMY: ICD-10-CM

## 2022-04-14 DIAGNOSIS — R47.9 SPEECH DIFFICULT TO UNDERSTAND: ICD-10-CM

## 2022-04-14 DIAGNOSIS — R47.1 DYSARTHRIA: ICD-10-CM

## 2022-04-14 DIAGNOSIS — Q28.3 CAVERNOUS MALFORMATION: Primary | ICD-10-CM

## 2022-04-14 PROCEDURE — 92507 TX SP LANG VOICE COMM INDIV: CPT

## 2022-04-14 NOTE — THERAPY TREATMENT NOTE
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 Therapist used appropriate personal protective equipment including mask, eye protection and gloves.  Mask used was standard procedure mask. Appropriate PPE was worn during the entire therapy session. Hand hygiene was completed before and after therapy session. Patient is not in enhanced droplet precautions.               Visit Dx:    ICD-10-CM ICD-9-CM   1. Cavernous malformation  Q28.3 228.02   2. H/O craniotomy  Z98.890 V45.89   3. Speech difficult to understand  R47.9 V49.89   4. Dysarthria  R47.1 784.51        OP SLP Assessment/Plan - 22 1608        SLP Plan    Plan Comments Continue current plan of care.  -AL          User Key  (r) = Recorded By, (t) = Taken By, (c) = Cosigned By    Initials Name Provider Type    Sofia Cooper MS CCC-SLP Speech and Language Pathologist                                   SLP OP Goals     Row Name 22 1320          Subjective Comments    Subjective Comments Pt participated well in session.  -AL            Subjective Pain    Able to rate subjective pain? yes  -AL     Pre-Treatment Pain Level 0  -AL            Dysarthria Goals    Patient will be able to engage in speech at the conversational level with maximum articulatory accuracy so that speech is understood by familiar /unfamiliar listeners 90%:;with cues  -AL     Status: Patient will be able to engage in speech at the  conversational level with maximum articulatory accuracy so that speech is understood by familiar /unfamiliar listeners Progressing as expected  -AL     Patient will apply compensatory strategies to improve intelligibility of speech during in spontaneous speech 90%:;with cues  -AL     Status: Patient will apply compensatory strategies to improve intelligibility of speech during in spontaneous speech Progressing as expected  -AL     Comments: Patient will apply compensatory strategies to improve intelligibility of speech during in spontaneous speech Pt produced multiple syllabic words with 100% accuracy with initial cue for slow rate and over-articulation. Pt read aloud conversational phrases with 95% accuracy with NO cues, 100% with MIN cues. Pt read aloud 10 word sentences with 85% accuracy with NO cues, 100% with MIN cues for slow rate. In barrier task, pt was 85% intelligible with initial cue for slow rate, 100% with MIN cues. Pt read aloud news article with 90% intelligibility with NO cues, 100% with MIN cues. Pt was judged to be 80% intelligible in conversation to an unfamiliar listener with intermittent hypernasality and difficulty with /s/ blends. With cues for slow rate, speech clarity was significantly improved.  -AL     Status: Patient will increase strength and power  of articulators so they can move more quickly and accurately to improve speech intelligibility by completing lip exercises Discontinued  -AL           User Key  (r) = Recorded By, (t) = Taken By, (c) = Cosigned By    Initials Name Provider Type    Sofia Cooper MS CCC-SLP Speech and Language Pathologist               OP SLP Education     Row Name 04/14/22 8110       Education    Barriers to Learning No barriers identified  -AL    Assessed Learning needs;Learning motivation;Learning preferences;Learning readiness  -AL    Learning Motivation Strong  -AL    Learning Method Explanation  -AL    Teaching Response Verbalized  understanding;Demonstrated understanding  -AL    Education Comments Discussed strategies to improve quality of speech. 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  -AL          User Key  (r) = Recorded By, (t) = Taken By, (c) = Cosigned By    Initials Name Effective Dates    Sofia Cooper MS CCC-SLP 06/16/21 -                      Time Calculation:   SLP Start Time: 1520  SLP Stop Time: 1601  SLP Time Calculation (min): 41 min  Untimed Charges  79341-RE Treatment/ST Modification Prosth Aug Alter : 41  Total Minutes  Untimed Charges Total Minutes: 41   Total Minutes: 41    Therapy Charges for Today     Code Description Service Date Service Provider Modifiers Qty    21731195856 HC ST TREATMENT SPEECH 3 4/14/2022 Sofia Celis, MS CCC-SLP GN 1                   Sofia Celis MS CCC-SLP  4/14/2022

## 2022-04-19 ENCOUNTER — DOCUMENTATION (OUTPATIENT)
Dept: SPEECH THERAPY | Facility: HOSPITAL | Age: 27
End: 2022-04-19

## 2022-04-19 NOTE — THERAPY DISCHARGE NOTE
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     OP SLP Assessment/Plan - 22 0759        SLP Assessment    Clinical Impression: Speech Language-Adult/Congnition Mild-Moderate:;Dysarthria- Mixed  -AL          User Key  (r) = Recorded By, (t) = Taken By, (c) = Cosigned By    Initials Name Provider Type    Sofia Cooper MS CCC-SLP Speech and Language Pathologist                  At discharge, pt presented with mild-moderate dysarthria characterized by imprecise articulation, occasional rushes of speech and occasional hypernasality. Pt was judged to be 80% intelligible in conversation with an unfamiliar listener. Pt was stimulable to significantly improve speech clarity with cues for slow rate and over-articulation. Use of slow rate also eliminated hypernasality; suspect hypernasality related to reduced coordination when speaking quickly. Encouraged pt to use reduced rate and over-articulation in conversation, as well as complete daily reading outloud to train use of strategies. She voiced understanding.               SLP OP Goals     Row Name 22 0700          Dysarthria Goals    Patient will be able to engage in speech at the conversational level with maximum articulatory accuracy so that speech is understood by familiar /unfamiliar listeners 90%:;with cues  -AL     Status: Patient will be able to engage in speech at the conversational level with maximum articulatory accuracy so that speech is  "understood by familiar /unfamiliar listeners Progressing as expected  -AL     Patient will apply compensatory strategies to improve intelligibility of speech during in spontaneous speech 90%:;with cues  -AL     Status: Patient will apply compensatory strategies to improve intelligibility of speech during in spontaneous speech Progressing as expected  -AL     \"Patient will increase strength and power  of articulators so they can move more quickly and accurately to improve speech intelligibility by completing lip exercises\" 90%:;with cues  -AL     Status: Patient will increase strength and power  of articulators so they can move more quickly and accurately to improve speech intelligibility by completing lip exercises Discontinued  -AL           User Key  (r) = Recorded By, (t) = Taken By, (c) = Cosigned By    Initials Name Provider Type    Sofia Cooper MS CCC-SLP Speech and Language Pathologist                 OP SLP Education     Row Name 04/19/22 0048       Education    Education Comments Pt has been educated on strategies to improve speech clarity (slow rate, over-articulation). Encouraged pt to complete daily reading outloud to practice speech strategies. 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 Pt voiced understanding.  -AL          User Key  (r) = Recorded By, (t) = Taken By, (c) = Cosigned By    Initials Name Effective Dates    Sofia Cooper MS CCC-SLP 06/16/21 -                 SLP Outcome Measures (last 72 hours)     SLP Outcome Measures     Row Name 04/19/22 0800             SLP Outcome Measures    Outcome Measure Used? Adult NOMS  -AL              Adult FCM Scores    FCM Chosen Motor Speech  -AL      Motor Speech Score 5  -AL            User Key  (r) = Recorded By, (t) = Taken By, (c) = Cosigned By    Initials Name Effective Dates    Sofia Cooper MS CCC-SLP 06/16/21 " -                                   OP SLP Discharge Summary  Date of Discharge: 04/19/22  Reason for Discharge: patient/family request discontinuation of services (Pt contacted office 4/18 and requested to discontinue outpatient speech therapy services.)  Progress Toward Achieving Short/long Term Goals: goals partially met within established timelines  Discharge Destination: home  Discharge Instructions: Continue home exercises for speech clarity.      Sofia Celis MS CCC-SLP  4/19/2022

## 2022-04-20 ENCOUNTER — TREATMENT (OUTPATIENT)
Dept: PHYSICAL THERAPY | Facility: CLINIC | Age: 27
End: 2022-04-20

## 2022-04-20 DIAGNOSIS — Q28.3 CAVERNOUS MALFORMATION: Primary | ICD-10-CM

## 2022-04-20 DIAGNOSIS — R26.9 ABNORMALITY OF GAIT: ICD-10-CM

## 2022-04-20 DIAGNOSIS — Z98.890 H/O CRANIOTOMY: ICD-10-CM

## 2022-04-20 PROCEDURE — 97110 THERAPEUTIC EXERCISES: CPT | Performed by: PHYSICAL THERAPIST

## 2022-04-20 PROCEDURE — 97530 THERAPEUTIC ACTIVITIES: CPT | Performed by: PHYSICAL THERAPIST

## 2022-04-20 NOTE — PROGRESS NOTES
Physical Therapy Daily Progress Note      Patient: Juli Luna   : 1995  Treatment Diagnosis:     ICD-10-CM ICD-9-CM   1. Cavernous malformation  Q28.3 228.02   2. H/O craniotomy  Z98.890 V45.89   3. Abnormality of gait  R26.9 781.2     Referring practitioner: Gary Anne MD  Date of Initial Visit: Type: THERAPY  Noted: 2022  Today's Date: 2022  Patient seen for 2 sessions           Subjective   Patient reports she tried the elliptical machine at the gym and noticed she has to focus more and it was harder than the TM.    Objective     See Exercise, Manual, and Modality Logs for complete treatment.       Assessment/Plan  Patient performed program to tolerance.  She had improved stability and dynamic balance with cueing and facilitory techniques as well as with advanced repetitions of each task.    Progress per Plan of Care           Timed:         Manual Therapy:         mins  73204;     Therapeutic Exercise:    15     mins  01453;     Neuromuscular Aspen:        mins  13561;    Therapeutic Activity:     31     mins  50225;     Gait Training:           mins  14799;     Ultrasound:          mins  19889;    Ionto                                  mins  21483  Self Care                            mins  34075  Canalith Repos         mins  99334  Orthotic MGMT/Train         mins  76370    Un-Timed:  Electrical Stimulation:         mins  28114 ( );  Dry Needling:          mins  95033 self-pay;  Dry Needling:          mins  37423 self-pay  Traction          mins  96589      Timed Treatment:   46   mins   Total Treatment:     46   mins        PT SIGNATURE: Jennie Willard PT     License Number: [id number]  Electronically signed by Jennie Willard PT, 22, 4:55 PM EDT

## 2022-04-21 ENCOUNTER — APPOINTMENT (OUTPATIENT)
Dept: SPEECH THERAPY | Facility: HOSPITAL | Age: 27
End: 2022-04-21

## 2022-04-26 ENCOUNTER — TREATMENT (OUTPATIENT)
Dept: PHYSICAL THERAPY | Facility: CLINIC | Age: 27
End: 2022-04-26

## 2022-04-26 DIAGNOSIS — Z98.890 H/O CRANIOTOMY: ICD-10-CM

## 2022-04-26 DIAGNOSIS — Q28.3 CAVERNOUS MALFORMATION: Primary | ICD-10-CM

## 2022-04-26 DIAGNOSIS — R26.9 ABNORMALITY OF GAIT: ICD-10-CM

## 2022-04-26 PROCEDURE — 97110 THERAPEUTIC EXERCISES: CPT | Performed by: PHYSICAL THERAPIST

## 2022-04-26 PROCEDURE — 97530 THERAPEUTIC ACTIVITIES: CPT | Performed by: PHYSICAL THERAPIST

## 2022-04-26 NOTE — PROGRESS NOTES
Physical Therapy Daily Progress Note      Patient: Juli Luna   : 1995  Treatment Diagnosis:     ICD-10-CM ICD-9-CM   1. Cavernous malformation  Q28.3 228.02   2. H/O craniotomy  Z98.890 V45.89   3. Abnormality of gait  R26.9 781.2     Referring practitioner: Gary Anne MD  Date of Initial Visit: Type: THERAPY  Noted: 2022  Today's Date: 2022  Patient seen for 3 sessions           Subjective   Patient reports no work out soreness following last session.  Has been getting more coordinated on the elliptical machine at the gym.      Objective     See Exercise, Manual, and Modality Logs for complete treatment.       Assessment/Plan  Patient performed program to tolerance.  She was challenged with dynamic core stabilization activities.  Needed Nitish to recover balance with some of the higher level activities.    Progress per Plan of Care           Timed:         Manual Therapy:         mins  55448;     Therapeutic Exercise:   10      mins  69597;     Neuromuscular Aspen:        mins  45949;    Therapeutic Activity:    36      mins  34969;     Gait Training:           mins  01322;     Ultrasound:          mins  53363;    Ionto                                  mins  96704  Self Care                            mins  24174  Canalith Repos         mins  30377  Orthotic MGMT/Train         mins  63175    Un-Timed:  Electrical Stimulation:         mins  79799 ( );  Dry Needling:          mins  74527 self-pay;  Dry Needling:          mins  56686 self-pay  Traction          mins  97330      Timed Treatment:   46   mins   Total Treatment:     46   mins        PT SIGNATURE: Jennie Willard PT     License Number: [id number]  Electronically signed by Jennie Willard PT, 22, 4:14 PM EDT

## 2022-04-28 ENCOUNTER — APPOINTMENT (OUTPATIENT)
Dept: SPEECH THERAPY | Facility: HOSPITAL | Age: 27
End: 2022-04-28

## 2022-05-02 ENCOUNTER — TREATMENT (OUTPATIENT)
Dept: PHYSICAL THERAPY | Facility: CLINIC | Age: 27
End: 2022-05-02

## 2022-05-02 DIAGNOSIS — Q28.3 CAVERNOUS MALFORMATION: Primary | ICD-10-CM

## 2022-05-02 DIAGNOSIS — Z98.890 H/O CRANIOTOMY: ICD-10-CM

## 2022-05-02 DIAGNOSIS — R26.9 ABNORMALITY OF GAIT: ICD-10-CM

## 2022-05-02 PROCEDURE — 97110 THERAPEUTIC EXERCISES: CPT | Performed by: PHYSICAL THERAPIST

## 2022-05-02 PROCEDURE — 97112 NEUROMUSCULAR REEDUCATION: CPT | Performed by: PHYSICAL THERAPIST

## 2022-05-02 NOTE — PROGRESS NOTES
Vestibular Daily Progress Note    Visit#:4  Subjective   I feel like my balance is worse since we last work on it in 2019. I am going to [place] in 2 weeks and want to be able to ride a bike.   Objective                  Computerized Dynamic Posturography  Composite Equilibrium Score: 72, WNL  Sensory Analysis WDL: Exceptions to WDL  Sensory Analysis: Vestibular (VEST 39)  Strategy Analysis: Ankle Dominant          PROCEDURES AND MODALITIES:  Paraffin:    Moist Heat:    Ice:    E-Stim:    Ultrasound:    Ionto:   Traction:    See Exercise, Manual, and Modality Logs for complete treatment.   Therapy Exercise 06902 13 minutes and NMR 19039 15 minutes       Timed Code Treatment: 28 Minutes  Total Treatment Time: 30 Minutes    Assessment/Plan   Patient's SOT score were compared to the last SOT in Dec 2019. The scores were essentially the same. She continues to have a mild vestibular dysfunction pattern. She is currently demonstrating balance that is at a high of a level we have ever tested. She should continue high level balance tasks that incorporate core strengthening and coordination challenges. She responded well to this in 2019.    Progress per Plan of Care    Esperanza Oneil, PT, DPT, CHT, CONSTANCEN  Physical Therapist  [place] license # [id number]

## 2022-05-05 ENCOUNTER — TREATMENT (OUTPATIENT)
Dept: PHYSICAL THERAPY | Facility: CLINIC | Age: 27
End: 2022-05-05

## 2022-05-05 ENCOUNTER — APPOINTMENT (OUTPATIENT)
Dept: SPEECH THERAPY | Facility: HOSPITAL | Age: 27
End: 2022-05-05

## 2022-05-05 DIAGNOSIS — Z98.890 H/O CRANIOTOMY: ICD-10-CM

## 2022-05-05 DIAGNOSIS — R26.9 ABNORMALITY OF GAIT: ICD-10-CM

## 2022-05-05 DIAGNOSIS — Q28.3 CAVERNOUS MALFORMATION: Primary | ICD-10-CM

## 2022-05-05 PROCEDURE — 97110 THERAPEUTIC EXERCISES: CPT | Performed by: PHYSICAL THERAPIST

## 2022-05-05 PROCEDURE — 97530 THERAPEUTIC ACTIVITIES: CPT | Performed by: PHYSICAL THERAPIST

## 2022-05-05 NOTE — PROGRESS NOTES
Physical Therapy Daily Progress Note      Patient: Juli Luna   : 1995  Treatment Diagnosis:     ICD-10-CM ICD-9-CM   1. Cavernous malformation  Q28.3 228.02   2. H/O craniotomy  Z98.890 V45.89   3. Abnormality of gait  R26.9 781.2     Referring practitioner: Gary Anne MD  Date of Initial Visit: Type: THERAPY  Noted: 2022  Today's Date: 2022  Patient seen for 5 sessions           Subjective   Patient reports she has been doing well, no falls or LOB.    Objective     See Exercise, Manual, and Modality Logs for complete treatment.       Assessment/Plan  Patient performed program to tolerance.  She did have significant challenge with dynamic balance activities this session.  She was able to tolerate progression of balance challenges.  Progress per Plan of Care           Timed:         Manual Therapy:         mins  02669;     Therapeutic Exercise:    8     mins  80299;     Neuromuscular Aspen:        mins  73775;    Therapeutic Activity:     35     mins  64876;     Gait Training:           mins  91710;     Ultrasound:          mins  32725;    Ionto                                  mins  34855  Self Care                            mins  77606  Canalith Repos         mins  46608  Orthotic MGMT/Train         mins  27804    Un-Timed:  Electrical Stimulation:         mins  89371 ( );  Dry Needling:          mins  10798 self-pay;  Dry Needling:          mins  42748 self-pay  Traction          mins  71237      Timed Treatment:   43   mins   Total Treatment:     45   mins        PT SIGNATURE: Jennie Willard PT     License Number: [id number]  Electronically signed by Jennie Willard PT, 22, 5:59 PM EDT

## 2022-05-11 ENCOUNTER — TREATMENT (OUTPATIENT)
Dept: PHYSICAL THERAPY | Facility: CLINIC | Age: 27
End: 2022-05-11

## 2022-05-11 DIAGNOSIS — Q28.3 CAVERNOUS MALFORMATION: Primary | ICD-10-CM

## 2022-05-11 DIAGNOSIS — Z98.890 H/O CRANIOTOMY: ICD-10-CM

## 2022-05-11 DIAGNOSIS — R26.9 ABNORMALITY OF GAIT: ICD-10-CM

## 2022-05-11 PROCEDURE — 97110 THERAPEUTIC EXERCISES: CPT | Performed by: PHYSICAL THERAPIST

## 2022-05-11 PROCEDURE — 97530 THERAPEUTIC ACTIVITIES: CPT | Performed by: PHYSICAL THERAPIST

## 2022-05-11 NOTE — PROGRESS NOTES
Physical Therapy Re-Assessment / Re-Certification        Patient: Juli Luna   : 1995  Visit Diagnoses:     ICD-10-CM ICD-9-CM   1. Cavernous malformation  Q28.3 228.02   2. H/O craniotomy  Z98.890 V45.89   3. Abnormality of gait  R26.9 781.2     Referring practitioner: Gary Anne MD  Date of Initial Visit: Type: THERAPY  Noted: 2022  Today's Date: 2022  Patient seen for 6 sessions      Subjective:   Juli Luna reports:   Subjective Questionnaire: ABC: 95%  Clinical Progress: improved  Home Program Compliance: Yes  Treatment has included: therapeutic exercise, neuromuscular re-education and therapeutic activity    Subjective   Patient reports no muscle soreness after last session.    Objective     Functional Assessment      Single Leg Stance   Left: 10 seconds  Right: 7 seconds-unsteady, multiple attempts     Comments  UE and LE ROM and strength grossly WNL     Sherwood/56     DGI: 23/24          See Exercise, Manual, and Modality Logs for complete treatment.     Assessment/Plan  Patient demonstrates improved balance test scores and SLS time.  She did need multiple attempts to stabilize on the right LE and was unsteady.  She is tolerating program well and benefits for cueing/facilitation to correct balance and for coordination of exercise movement patterns.  Progress toward previous goals: Partially Met    Goal Review  Short Term Goals (4 wks):  1.  Patient will have increased SLS time of 20 sec. Bilaterally.-progressing  2.  Patient will be able to perform dynamic standing reciprocal UE/LE movement patterns without LOB.-progressing  3.  Patient will be able to carry books/folders/bag while navigating stairs without miss steps/LOB.-progressing  4.  Complete NeuroCom balance assessment.-met  5.  Patient to show improvement on her NeuroCom balance scores.     Long Term Goals (8 wks):  1.  Patient will have improved DGI score of 24.-progressing  2.  Patient will be able to perform dynamic SLS  activities without LOB.-progressing  3.  Patient will demonstrate improved core strength/trunk coordination to maintain sitting balance on unstable surface.-progressing   4.  Patient will be able to use elliptical machine for 5 minutes with good balance and coordination.-progressing      Recommendations: Continue as planned  Timeframe: 2 months  Prognosis to achieve goals: good    PT SIGNATURE: Jennie Willard PT     License Number: [id number]  Electronically signed by Jennie Willard PT, 05/11/22, 3:54 PM EDT    Certification Period: 5/11/2022 thru 8/8/2022  I certify that the therapy services are furnished while this patient is under my care.  The services outlined above are required by this patient, and will be reviewed every 90 days.    Signature: __________________________________    Gary Anne MD   NPI: [id number]    Please sign and return via fax to [phone]. Thank you, [facility].      Timed:         Manual Therapy:         mins  16204;     Therapeutic Exercise:    15     mins  50600;     Neuromuscular Aspen:        mins  54465;    Therapeutic Activity:     30     mins  72068;     Gait Training:           mins  60140;     Ultrasound:          mins  34540;    Ionto                                  mins  61356  Self Care                            mins  94576  Canalith Repos         mins  51955  Orthotic MGMT/Train         mins  64566    Un-Timed:  Electrical Stimulation:         mins  57804 ( );  Dry Needling:          mins  94951 self-pay;  Dry Needling:          mins  22411 self-pay  Traction          mins  54542  Low Eval          mins  66617  Mod Eval          mins  64402  High Eval                            mins  59716    Timed Treatment:   45   mins   Total Treatment:     50   mins

## 2022-05-12 ENCOUNTER — APPOINTMENT (OUTPATIENT)
Dept: SPEECH THERAPY | Facility: HOSPITAL | Age: 27
End: 2022-05-12

## 2022-05-16 ENCOUNTER — TREATMENT (OUTPATIENT)
Dept: PHYSICAL THERAPY | Facility: CLINIC | Age: 27
End: 2022-05-16

## 2022-05-16 DIAGNOSIS — Z98.890 H/O CRANIOTOMY: ICD-10-CM

## 2022-05-16 DIAGNOSIS — R26.9 ABNORMALITY OF GAIT: ICD-10-CM

## 2022-05-16 DIAGNOSIS — Q28.3 CAVERNOUS MALFORMATION: Primary | ICD-10-CM

## 2022-05-16 PROCEDURE — 97112 NEUROMUSCULAR REEDUCATION: CPT | Performed by: PHYSICAL THERAPIST

## 2022-05-16 PROCEDURE — 97530 THERAPEUTIC ACTIVITIES: CPT | Performed by: PHYSICAL THERAPIST

## 2022-05-16 PROCEDURE — 97110 THERAPEUTIC EXERCISES: CPT | Performed by: PHYSICAL THERAPIST

## 2022-05-16 NOTE — PROGRESS NOTES
Physical Therapy Daily Progress Note    Visit #: 7  Juli Luna reports: I have not tried riding a bike yet but my brother gave me one to try. I leave for my trip to [place] mid June during which I would like to ride a bike.   Pain Scale (0-10):     Subjective       Objective   See Exercise, Manual, and Modality Logs for complete treatment.     PROCEDURES AND MODALITIES:  Paraffin:   pre-rx  Moist Heat:    Ice:   post-rx  E-Stim:    Ultrasound:    Ionto:   Traction:    Dry Needling:         Therapy Exercise 49435 15 minutes, NMR 26325 15 minutes and Ther Act 09666 10 minutes     Timed Code Treatment: 40 Minutes  Total Treatment Time: 40 Minutes    Assessment/Plan  Patient has decreased stability on the R LE when compared to the L. Her stability improves with VC to activate core. She needs additional core strengthening and coordination tasks to improve her functional movement patterns and stability with balance tasks. This is a chronic problem for this patient.     Progress per Plan of Care      Esperanza Oneil, PT, DPT, CHT, CONSTANCEN  Physical Therapist  [place] License # [id number]

## 2022-05-19 ENCOUNTER — APPOINTMENT (OUTPATIENT)
Dept: SPEECH THERAPY | Facility: HOSPITAL | Age: 27
End: 2022-05-19

## 2022-05-23 ENCOUNTER — TREATMENT (OUTPATIENT)
Dept: PHYSICAL THERAPY | Facility: CLINIC | Age: 27
End: 2022-05-23

## 2022-05-23 DIAGNOSIS — Q28.3 CAVERNOUS MALFORMATION: Primary | ICD-10-CM

## 2022-05-23 DIAGNOSIS — R26.9 ABNORMALITY OF GAIT: ICD-10-CM

## 2022-05-23 DIAGNOSIS — Z98.890 H/O CRANIOTOMY: ICD-10-CM

## 2022-05-23 PROCEDURE — 97530 THERAPEUTIC ACTIVITIES: CPT | Performed by: PHYSICAL THERAPIST

## 2022-05-23 PROCEDURE — 97112 NEUROMUSCULAR REEDUCATION: CPT | Performed by: PHYSICAL THERAPIST

## 2022-05-23 PROCEDURE — 97110 THERAPEUTIC EXERCISES: CPT | Performed by: PHYSICAL THERAPIST

## 2022-05-23 NOTE — PROGRESS NOTES
Physical Therapy Daily Progress Note    Visit #: 8  Juli Luna reports: I tried riding the bike but it was not setup for my height so I didn't try too long. I was not able to keep my balance.   Pain Scale (0-10):     Subjective       Objective   See Exercise, Manual, and Modality Logs for complete treatment.     PROCEDURES AND MODALITIES:  Paraffin:   pre-rx  Moist Heat:    Ice:   post-rx  E-Stim:    Ultrasound:    Ionto:   Traction:    Dry Needling:         Therapy Exercise 37898 13 minutes, NMR 77053 13 minutes and Ther Act 37658 19 minutes     Timed Code Treatment: 45 Minutes  Total Treatment Time: 45 Minutes    Assessment/Plan  Patient continues to have imbalance with SLS and tandem balance/ambulation. She has not met her goal of being able to ride a bike yet. However, she is very motivated to improve.     Progress per Plan of Care      Esperanza Oneil, PT, DPT, CHT, CONSTANCEN  Physical Therapist  [place] License # [id number]

## 2022-05-25 ENCOUNTER — TREATMENT (OUTPATIENT)
Dept: PHYSICAL THERAPY | Facility: CLINIC | Age: 27
End: 2022-05-25

## 2022-05-25 DIAGNOSIS — M25.561 RIGHT KNEE PAIN, UNSPECIFIED CHRONICITY: Primary | ICD-10-CM

## 2022-05-25 PROCEDURE — 97530 THERAPEUTIC ACTIVITIES: CPT | Performed by: PHYSICAL THERAPIST

## 2022-05-25 PROCEDURE — 97110 THERAPEUTIC EXERCISES: CPT | Performed by: PHYSICAL THERAPIST

## 2022-05-25 PROCEDURE — 97161 PT EVAL LOW COMPLEX 20 MIN: CPT | Performed by: PHYSICAL THERAPIST

## 2022-05-26 ENCOUNTER — APPOINTMENT (OUTPATIENT)
Dept: SPEECH THERAPY | Facility: HOSPITAL | Age: 27
End: 2022-05-26

## 2022-05-26 NOTE — PROGRESS NOTES
Physical Therapy Initial Evaluation and Plan of Care        Patient: Juli Luna   : 1995  Visit Diagnoses:     ICD-10-CM ICD-9-CM   1. Right knee pain, unspecified chronicity  M25.561 719.46     Referring practitioner: Vandana Huang APRN  Date of Initial Visit: 2022  Today's Date: 2022  Patient seen for 1 sessions           Subjective Questionnaire: LEFS      Subjective Evaluation    History of Present Illness  Date of onset: 2022  Mechanism of injury: Patient reports she started having right knee pain intermittently back in .  Thinks it may be due to her workout routine.  States the pain comes and goes, is not predictable.  Reports recently the knee pain has been worse.    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    Subjective comment: Patient reports right knee pain.  Patient Occupation: ;  at [employer]  Pain  Current pain ratin  At best pain ratin  At worst pain ratin  Quality: discomfort  Aggravating factors: stairs and squatting (TM walking on incline, kneeling)  Progression: worsening    Diagnostic Tests  No diagnostic tests performed    Treatments  No previous or current treatments  Patient Goals  Patient goals for therapy: decreased pain  Patient goal: Get her knee ready for trip in a couple weeks.           Objective          Palpation     Additional Palpation Details  No TTP    Tenderness     Additional Tenderness Details  No TTP    Active Range of Motion     Right Knee   Flexion: 126 degrees   Extension: 10 (hyperextension) degrees     Patellar Mobility     Right Knee Patellar tendons within functional limits include the medial, lateral, superior and inferior.     Strength/Myotome Testing     Right Hip   Planes of Motion   Extension: 4  Abduction: 3+    Right Knee   Flexion: 5  Extension: 5  Quadriceps contraction: good    Tests     Right Knee   Positive patella-femoral grind.    Negative anterior drawer, lateral Aruna, medial Aruna, posterior drawer, valgus stress test at 0 degrees, valgus stress test at 30 degrees, varus stress test at 0 degrees and varus stress test at 30 degrees.     Additional Tests Details  Symmetrical SIJ alignment    Functional Assessment   Squat   Pain and sitting toward left side.     Single Leg Squat     Right Leg  Pain, positive Trendelenburg and valgus.           Assessment & Plan     Assessment  Impairments: activity intolerance, impaired physical strength and pain with function  Functional Limitations: walking and uncomfortable because of pain  Assessment details: Juli Luna is a pleasant 27 y.o. female that presents with normal ROM, mild strength deficits, no TTP, impaired coordination/functional movement patterns.  She has flexibility limitations in LEs and increased calf complex muscle tone due to residual deficits after aneurism in 2016. Pt will benefit from skilled PT services in order to address listed impairments, decrease pain and restore function.    Prognosis: good  Prognosis details: Patient demonstrates good rehab potential as evidenced by high motivation to participate with PT POC.    Goals  Plan Goals: Short Term Goals (2 wks):  1.  Patient will be independent in performance of HEP.  2.  Patient will demonstrate proper wt distribution on LE with ascending/descending stairs.  3.  Patient will demonstrate proper form with functional squat patterns.  4.  Patient will have LE muscle flexibility WNLs.      Long Term Goals (6 wks):  1.  Patient will have right hip strength of 4+/5 or better.  2.  Patient will have LEFS score of 50/80 or better.  3.  Patient will be able to perform functional squat patterns without pain limitations.  4.  Patient will be able to transition to/from kneeling without pain limitations.      Plan  Therapy options: will be seen for skilled therapy services  Planned modality interventions: high voltage pulsed current  (pain management), ultrasound, thermotherapy (hydrocollator packs) and cryotherapy  Planned therapy interventions: manual therapy, soft tissue mobilization, strengthening, stretching, balance/weight-bearing training, flexibility, functional ROM exercises, gait training, joint mobilization, home exercise program, neuromuscular re-education, therapeutic activities and body mechanics training  Frequency: 2x week  Duration in weeks: 6  Treatment plan discussed with: patient  Plan details: Pt was educated on the importance of their HEP and their current need for continued skilled physical therapy. Patients goals and potential limitations were discussed and pt is in agreement with current plan of care and treatment emphasis.                History # of Personal Factors and/or Comorbidities: MODERATE (1-2)  Examination of Body System(s): # of elements: LOW (1-2)  Clinical Presentation: STABLE   Clinical Decision Making: LOW       Timed:         Manual Therapy:         mins  33881;     Therapeutic Exercise:    15     mins  12082;     Neuromuscular Aspen:        mins  68673;    Therapeutic Activity:     8     mins  10701;     Gait Training:           mins  60849;     Ultrasound:          mins  96910;    Ionto                                  mins  47132  Self Care                            mins  74517  Canalith Repos         mins  63823  Orthotic MGMT/Train         mins  65822    Un-Timed:  Electrical Stimulation:         mins  43069 ( );  Dry Needling:          mins  41265 self-pay;  Dry Needling:          mins  70744 self-pay  Traction          mins  13445  Low Eval     20     mins  86164  Mod Eval          mins  18126  High Eval                            mins  15879    Timed Treatment:   23   mins   Total Treatment:     45   mins      PT SIGNATURE: Jennie Willard PT     License Number: [id number]  Electronically signed by Jennie Willard PT, 05/26/22, 8:08 AM EDT      DATE TREATMENT INITIATED: 5/26/2022    Initial  Certification  Certification Period: 5/26/2022 thru 8/23/2022  I certify that the therapy services are furnished while this patient is under my care.  The services outlined above are required by this patient, and will be reviewed every 90 days.     PHYSICIAN: Vandana Huang APRN      NPI: [id number]  DATE:         Please sign and return via fax to [phone]. Thank you, [facility].

## 2022-06-01 ENCOUNTER — TREATMENT (OUTPATIENT)
Dept: PHYSICAL THERAPY | Facility: CLINIC | Age: 27
End: 2022-06-01

## 2022-06-01 DIAGNOSIS — M25.561 RIGHT KNEE PAIN, UNSPECIFIED CHRONICITY: Primary | ICD-10-CM

## 2022-06-01 PROCEDURE — 97530 THERAPEUTIC ACTIVITIES: CPT | Performed by: PHYSICAL THERAPIST

## 2022-06-01 PROCEDURE — 97110 THERAPEUTIC EXERCISES: CPT | Performed by: PHYSICAL THERAPIST

## 2022-06-01 NOTE — PROGRESS NOTES
Physical Therapy Daily Progress Note      Patient: Juli Luna   : 1995  Treatment Diagnosis:     ICD-10-CM ICD-9-CM   1. Right knee pain, unspecified chronicity  M25.561 719.46     Referring practitioner: DEANA Amaro  Date of Initial Visit: Type: THERAPY  Noted: 2022  Today's Date: 2022  Patient seen for 2 sessions           Subjective   Patient reports she had increased pain in her knee yesterday that required pain medication.  States the only difference in her activity was more walking over the weekend.    Objective     See Exercise, Manual, and Modality Logs for complete treatment.       Assessment/Plan  Patient performed program with progressed strengthening and dynamic balance exercises with no pain provocation.  She responded well to stretching with decreased forefoot WB during gait.  Will continue to progress as tolerated.  Provided written instructions for home use.  Progress per Plan of Care and Progress strengthening /stabilization /functional activity           Timed:         Manual Therapy:         mins  76853;     Therapeutic Exercise:    30     mins  53315;     Neuromuscular Aspen:        mins  92514;    Therapeutic Activity:     10     mins  19949;     Gait Training:           mins  87296;     Ultrasound:          mins  74789;    Ionto                                  mins  48084  Self Care                            mins  55058  Canalith Repos         mins  18930  Orthotic MGMT/Train         mins  11004    Un-Timed:  Electrical Stimulation:         mins  84638 ( );  Dry Needling:          mins  41741 self-pay;  Dry Needling:          mins  17983 self-pay  Traction          mins  01022      Timed Treatment:   40   mins   Total Treatment:     40   mins        PT SIGNATURE: Jennie Willard PT     License Number: [id number]  Electronically signed by Jennie Willard PT, 22, 12:41 PM EDT

## 2022-06-01 NOTE — PATIENT INSTRUCTIONS
Access Code: [email or web address]  URL: [email or web address]  Date: 06/01/2022  Prepared by: Jennie Willard    Exercises  Long Sitting Calf Stretch with Strap - 1 x daily - 7 x weekly - 1 sets - 3 reps - 30 sec. hold  Gastroc Stretch on Step - 1 x daily - 7 x weekly - 1 sets - 3 reps - 30 sec. hold  Clamshell with Resistance - 1 x daily - 7 x weekly - 3 sets - 10 reps  Hooklying Isometric Clamshell - 1 x daily - 7 x weekly - 3 sets - 10 reps  Side Stepping with Resistance at Ankles - 1 x daily - 7 x weekly - 3 sets - 10 reps  Standing Repeated Hip Flexion on Foam Pad - 1 x daily - 7 x weekly - 1 sets - 10 reps  Standing Repeated Hip Abduction on Foam Pad - 1 x daily - 7 x weekly - 1 sets - 10 reps  Standing Repeated Hip Extension on Foam Pad - 1 x daily - 7 x weekly - 1 sets - 10 reps  Single Leg Bridge with Leg Supported - 1 x daily - 7 x weekly - 3 sets - 10 reps

## 2022-06-02 ENCOUNTER — APPOINTMENT (OUTPATIENT)
Dept: SPEECH THERAPY | Facility: HOSPITAL | Age: 27
End: 2022-06-02

## 2022-06-06 ENCOUNTER — TREATMENT (OUTPATIENT)
Dept: PHYSICAL THERAPY | Facility: CLINIC | Age: 27
End: 2022-06-06

## 2022-06-06 DIAGNOSIS — Q28.3 CAVERNOUS MALFORMATION: ICD-10-CM

## 2022-06-06 DIAGNOSIS — R26.9 ABNORMALITY OF GAIT: Primary | ICD-10-CM

## 2022-06-06 DIAGNOSIS — Z98.890 H/O CRANIOTOMY: ICD-10-CM

## 2022-06-06 PROCEDURE — 97530 THERAPEUTIC ACTIVITIES: CPT | Performed by: PHYSICAL THERAPIST

## 2022-06-06 PROCEDURE — 97112 NEUROMUSCULAR REEDUCATION: CPT | Performed by: PHYSICAL THERAPIST

## 2022-06-06 PROCEDURE — 97110 THERAPEUTIC EXERCISES: CPT | Performed by: PHYSICAL THERAPIST

## 2022-06-06 NOTE — PROGRESS NOTES
Vestibular Daily Progress Note    Visit#:9  Subjective   Patient reports she is not able to ride a bike safely yet. She denies knee pain with any exercise we did today.   Objective                  Computerized Dynamic Posturography  Composite Equilibrium Score: 72, WNL  Sensory Analysis WDL: Exceptions to WDL  Sensory Analysis: Vestibular (49)  Strategy Analysis: Ankle Dominant      See NeuroCom custom training report.        PROCEDURES AND MODALITIES:  Paraffin:    Moist Heat:    Ice:    E-Stim:    Ultrasound:    Ionto:   Traction:    See Exercise, Manual, and Modality Logs for complete treatment.   Therapy Exercise 85155 15 minutes, NMR 59506 20 minutes and Ther Act 25433 10 minutes       Timed Code Treatment: 45 Minutes  Total Treatment Time: 45 Minutes    Assessment & Plan   NEUROCOM CDP testing revealed the same composite equilibrium score as 1 month ago. However, her individual vestibular score did improve. She continues to have loss of balance with SL balance tasks. She does perform better with VCs to contract TA.     Progress per Plan of Care    Esperanza Oneil PT, DPT, CHT, CONSTANCEN  Physical Therapist  [place] license # [id number]

## 2022-06-08 ENCOUNTER — TREATMENT (OUTPATIENT)
Dept: PHYSICAL THERAPY | Facility: CLINIC | Age: 27
End: 2022-06-08

## 2022-06-08 DIAGNOSIS — M25.561 RIGHT KNEE PAIN, UNSPECIFIED CHRONICITY: Primary | ICD-10-CM

## 2022-06-08 PROCEDURE — 97110 THERAPEUTIC EXERCISES: CPT | Performed by: PHYSICAL THERAPIST

## 2022-06-08 PROCEDURE — 97530 THERAPEUTIC ACTIVITIES: CPT | Performed by: PHYSICAL THERAPIST

## 2022-06-08 PROCEDURE — 97140 MANUAL THERAPY 1/> REGIONS: CPT | Performed by: PHYSICAL THERAPIST

## 2022-06-08 NOTE — PROGRESS NOTES
Physical Therapy Daily Progress Note      Patient: Juli Luna   : 1995  Treatment Diagnosis:     ICD-10-CM ICD-9-CM   1. Right knee pain, unspecified chronicity  M25.561 719.46     Referring practitioner: DEANA Amaro  Date of Initial Visit: Type: THERAPY  Noted: 2022  Today's Date: 2022  Patient seen for 3 sessions           Subjective   Patient reports she had an episode of knee pain when she lifting her leg to push a dresser drawer closed.  Reports she leaves for her trip this  and she will be gone for two weeks.      Objective     See Exercise, Manual, and Modality Logs for complete treatment.       Assessment/Plan  Patient had mild TTP of the inferior pole of the patella and proximal patellar tendon.  Applied KT tape to right knee to unload patellar tendon.  Educated patient in self taping.  Patient tolerated exercise progression well with no adverse symptoms.  Her coordination improved with successive repetitions.  Provided written instructions for HEP progression for home use.  Will resume PT upon her return from vacation.  Progress per Plan of Care and Progress strengthening /stabilization /functional activity           Timed:         Manual Therapy:   8      mins  18993;     Therapeutic Exercise:    24     mins  79610;     Neuromuscular Aspen:        mins  99578;    Therapeutic Activity:     10     mins  31920;     Gait Training:           mins  44841;     Ultrasound:          mins  94544;    Ionto                                  mins  68487  Self Care                            mins  11232  Canalith Repos         mins  53814  Orthotic MGMT/Train         mins  40310    Un-Timed:  Electrical Stimulation:         mins  47287 ( );  Dry Needling:          mins  63840 self-pay;  Dry Needling:          mins  74650 self-pay  Traction          mins  68387      Timed Treatment:   42   mins   Total Treatment:     42   mins        PT SIGNATURE: Jennie Willard, PT     License  Number: [id number]  Electronically signed by Jennie Willard PT, 06/08/22, 1:23 PM EDT

## 2022-06-09 ENCOUNTER — APPOINTMENT (OUTPATIENT)
Dept: SPEECH THERAPY | Facility: HOSPITAL | Age: 27
End: 2022-06-09

## 2022-06-16 ENCOUNTER — APPOINTMENT (OUTPATIENT)
Dept: SPEECH THERAPY | Facility: HOSPITAL | Age: 27
End: 2022-06-16

## 2022-06-23 ENCOUNTER — APPOINTMENT (OUTPATIENT)
Dept: SPEECH THERAPY | Facility: HOSPITAL | Age: 27
End: 2022-06-23

## 2022-06-27 ENCOUNTER — TELEPHONE (OUTPATIENT)
Dept: PHYSICAL THERAPY | Facility: CLINIC | Age: 27
End: 2022-06-27

## 2022-06-27 ENCOUNTER — TREATMENT (OUTPATIENT)
Dept: PHYSICAL THERAPY | Facility: CLINIC | Age: 27
End: 2022-06-27

## 2022-06-27 DIAGNOSIS — Q28.3 CAVERNOUS MALFORMATION: ICD-10-CM

## 2022-06-27 DIAGNOSIS — R26.9 ABNORMALITY OF GAIT: Primary | ICD-10-CM

## 2022-06-27 PROCEDURE — 97112 NEUROMUSCULAR REEDUCATION: CPT | Performed by: PHYSICAL THERAPIST

## 2022-06-27 PROCEDURE — 97110 THERAPEUTIC EXERCISES: CPT | Performed by: PHYSICAL THERAPIST

## 2022-06-27 NOTE — PROGRESS NOTES
Vestibular Daily Progress Note    Visit#:10  Subjective   I did a lot of walking and stairs on my trip. I did have some difficulty riding a Vespa so I had to ride with someone else because of my balance.   Objective                             PROCEDURES AND MODALITIES:  Paraffin:    Moist Heat:    Ice:    E-Stim:    Ultrasound:    Ionto:   Traction:    See Exercise, Manual, and Modality Logs for complete treatment.   Therapy Exercise 18937 16 minutes and NMR 91663 23 minutes       Timed Code Treatment: 39 Minutes  Total Treatment Time: 39 Minutes    Assessment & Plan   Patient continues to have difficulty with SL balance tasks. Patient would benefit from ongoing PT to address balance and knee pain.     Progress per Plan of Care    Esperanza Oneil, PT, DPT, CHT, CIDN  Physical Therapist  [place] license # [id number]

## 2022-06-29 ENCOUNTER — TELEPHONE (OUTPATIENT)
Dept: PHYSICAL THERAPY | Facility: CLINIC | Age: 27
End: 2022-06-29

## 2022-06-30 ENCOUNTER — APPOINTMENT (OUTPATIENT)
Dept: SPEECH THERAPY | Facility: HOSPITAL | Age: 27
End: 2022-06-30

## 2022-07-07 ENCOUNTER — APPOINTMENT (OUTPATIENT)
Dept: SPEECH THERAPY | Facility: HOSPITAL | Age: 27
End: 2022-07-07

## 2022-07-07 ENCOUNTER — TREATMENT (OUTPATIENT)
Dept: PHYSICAL THERAPY | Facility: CLINIC | Age: 27
End: 2022-07-07

## 2022-07-07 DIAGNOSIS — M25.561 RIGHT KNEE PAIN, UNSPECIFIED CHRONICITY: Primary | ICD-10-CM

## 2022-07-07 PROCEDURE — 97530 THERAPEUTIC ACTIVITIES: CPT | Performed by: PHYSICAL THERAPIST

## 2022-07-07 PROCEDURE — 97110 THERAPEUTIC EXERCISES: CPT | Performed by: PHYSICAL THERAPIST

## 2022-07-07 NOTE — PROGRESS NOTES
Physical Therapy Re-Assessment / Re-Certification        Patient: Juli Luna   : 1995  Visit Diagnoses:     ICD-10-CM ICD-9-CM   1. Right knee pain, unspecified chronicity  M25.561 719.46     Referring practitioner: DEANA Amaro  Date of Initial Visit: Type: THERAPY  Noted: 2022  Today's Date: 2022  Patient seen for 4 sessions      Subjective:   Juli Luna reports:   Subjective Questionnaire: LEFS: 54/80  Clinical Progress: improved  Home Program Compliance: Yes  Treatment has included: therapeutic exercise, manual therapy and therapeutic activity    Subjective   Patient reports she had increased knee pain while on vacation specifically with stairs.  States she took her anti-inflammatory medication starting the second day of vacation which helped the knee pain.  Reports she has not had too much pain since returning home and has not taken the medication either.  Report the knee pain is provoked by increased walking distances and mostly stairs.    Objective     Palpation     Right knee   TTP inferior pole of patella, patellar tendon        Active Range of Motion      Right Knee   Flexion: 132 degrees   Extension: 8 (hyperextension) degrees      Patellar Mobility      Right Knee Patellar tendons within functional limits include the medial, lateral, superior and inferior.      Strength/Myotome Testing      Right Hip   Planes of Motion   Extension: 4  Abduction: 3+     Right Knee   Flexion: 5  Extension: 5  Quadriceps contraction: good     Tests      Right Knee   Positive patella-femoral grind.   Increased lateral patellar tracking    Functional Assessment   Squat   Pain and sitting toward left side.      Single Leg Squat      Right Leg  Pain, positive Trendelenburg and valgus.            See Exercise, Manual, and Modality Logs for complete treatment.     Assessment/Plan  Patient presents with mild discomfort at the inferior patella/patellar tendon of the right knee and increased lateral  patellar tracking.  She continues to have limited right hip strength and increased tone in her right calf muscle complex due to prior ICH. She will benefit from continued PT for strengthening and to reduce PF stresses with functional movement pattern: squatting, stairs, standing-kneeling transitions.  Progress toward previous goals: Partially Met    Goal Review  Short Term Goals (2 wks):  1.  Patient will be independent in performance of HEP.-ongoing  2.  Patient will demonstrate proper wt distribution on LE with ascending/descending stairs.-partially met  3.  Patient will demonstrate proper form with functional squat patterns.-progressing  4.  Patient will have LE muscle flexibility WNLs.-partially met      Long Term Goals (4 wks):  1.  Patient will have right hip strength of 4+/5 or better.-not met  2.  Patient will have LEFS score of 50/80 or better.-met  3.  Patient will be able to perform functional squat patterns without pain limitations.-progressing  4.  Patient will be able to transition to/from kneeling without pain limitations.-progressing       Recommendations: Continue as planned  Timeframe: 1 month  Prognosis to achieve goals: good    PT SIGNATURE: Jennie Willard PT     License Number: [id number]  Electronically signed by Jennie Willard PT, 07/07/22, 10:44 AM EDT    Certification Period: 7/7/2022 thru 10/4/2022  I certify that the therapy services are furnished while this patient is under my care.  The services outlined above are required by this patient, and will be reviewed every 90 days.    Signature: __________________________________    Vandana Huang APRN   NPI: [id number]    Please sign and return via fax to [phone]. Thank you, [facility].      Timed:         Manual Therapy:         mins  52766;     Therapeutic Exercise:    23     mins  91378;     Neuromuscular Aspen:        mins  04236;    Therapeutic Activity:     15     mins  28794;     Gait Training:            mins  41409;     Ultrasound:          mins  34381;    Ionto                                  mins  00051  Self Care                            mins  91979  Canalith Repos         mins  76579  Orthotic MGMT/Train         mins  24153    Un-Timed:  Electrical Stimulation:         mins  04521 ( );  Dry Needling:          mins  81667 self-pay;  Dry Needling:          mins  79990 self-pay  Traction          mins  55092  Low Eval          mins  61996  Mod Eval          mins  45904  High Eval                            mins  46297    Timed Treatment:   38   mins   Total Treatment:     40   mins

## 2022-07-12 ENCOUNTER — TREATMENT (OUTPATIENT)
Dept: PHYSICAL THERAPY | Facility: CLINIC | Age: 27
End: 2022-07-12

## 2022-07-12 DIAGNOSIS — M25.561 RIGHT KNEE PAIN, UNSPECIFIED CHRONICITY: Primary | ICD-10-CM

## 2022-07-12 PROCEDURE — 97035 APP MDLTY 1+ULTRASOUND EA 15: CPT | Performed by: PHYSICAL THERAPIST

## 2022-07-12 PROCEDURE — 97110 THERAPEUTIC EXERCISES: CPT | Performed by: PHYSICAL THERAPIST

## 2022-07-12 PROCEDURE — 97530 THERAPEUTIC ACTIVITIES: CPT | Performed by: PHYSICAL THERAPIST

## 2022-07-12 NOTE — PROGRESS NOTES
Physical Therapy Daily Progress Note      Patient: Juli Luna   : 1995  Treatment Diagnosis:     ICD-10-CM ICD-9-CM   1. Right knee pain, unspecified chronicity  M25.561 719.46     Referring practitioner: DEANA Amaro  Date of Initial Visit: Type: THERAPY  Noted: 2022  Today's Date: 2022  Patient seen for 5 sessions           Subjective   Patient reports she has not been doing many stairs so her knee has not been very painful.  States she feels pressure in the front/bottom arc of her knee.    Objective     See Exercise, Manual, and Modality Logs for complete treatment.       Assessment/Plan  Patient tolerated US to right knee well with no adverse symptoms.  She was able to perform progressed strengthening exercises without pain provocation.  She had difficulty with controlling right knee extension with step ups-demonstrates a hard snap into hyperextension. This is likely due to increased gastroc muscle tone and muscle tightness.  Benefits from cueing for exercise technique and to prevent compensatory patterns.  Progress per Plan of Care           Timed:         Manual Therapy:         mins  85034;     Therapeutic Exercise:    20     mins  28280;     Neuromuscular Aspen:        mins  19563;    Therapeutic Activity:     10     mins  09126;     Gait Training:           mins  84913;     Ultrasound:     10     mins  85240;    Ionto                                  mins  43299  Self Care                            mins  88163  Canalith Repos         mins  91719  Orthotic MGMT/Train         mins  03402    Un-Timed:  Electrical Stimulation:         mins  24575 ( );  Dry Needling:          mins  89039 self-pay;  Dry Needling:          mins  57873 self-pay  Traction          mins  68203      Timed Treatment:   40   mins   Total Treatment:     40   mins        PT SIGNATURE: Jennie Willard PT     License Number: [id number]  Electronically signed by Jennie Willard PT, 22, 10:59 AM  EDT

## 2022-07-14 ENCOUNTER — TREATMENT (OUTPATIENT)
Dept: PHYSICAL THERAPY | Facility: CLINIC | Age: 27
End: 2022-07-14

## 2022-07-14 DIAGNOSIS — M25.561 RIGHT KNEE PAIN, UNSPECIFIED CHRONICITY: Primary | ICD-10-CM

## 2022-07-14 PROCEDURE — 97530 THERAPEUTIC ACTIVITIES: CPT | Performed by: PHYSICAL THERAPIST

## 2022-07-14 PROCEDURE — 97110 THERAPEUTIC EXERCISES: CPT | Performed by: PHYSICAL THERAPIST

## 2022-07-14 PROCEDURE — 97035 APP MDLTY 1+ULTRASOUND EA 15: CPT | Performed by: PHYSICAL THERAPIST

## 2022-07-14 NOTE — PROGRESS NOTES
Physical Therapy Daily Progress Note      Patient: Juli Luna   : 1995  Treatment Diagnosis:     ICD-10-CM ICD-9-CM   1. Right knee pain, unspecified chronicity  M25.561 719.46     Referring practitioner: DEANA Amaro  Date of Initial Visit: Type: THERAPY  Noted: 2022  Today's Date: 2022  Patient seen for 6 sessions           Subjective   Patient reports her knee felt fine after last session-went to the gym later that day and spent some time on the elliptical machine.  Started having knee pain a few hours after her workout that felt like it was under her knee cap.  States she took the medication for her knee pain and this time it didn't help.  Reports her knee feels fine today.    Objective     See Exercise, Manual, and Modality Logs for complete treatment.       Assessment/Plan  Patient presented with decreased VMO activation and pre-patellar soft tissue thickness.  She did not have any TTP.  Performed US to pre-patellar region and exercises targeted at isolating quad recruitment. She tolerated exercises well with no pain provocation.  HEP modifications of switching from the elliptical machine to stationary bike.  Will re-introduce dynamic strengthening activities if her symptoms are not exacerbated by current exercises.  Progress per Plan of Care and Progress strengthening /stabilization /functional activity           Timed:         Manual Therapy:         mins  77614;     Therapeutic Exercise:    20     mins  93647;     Neuromuscular Aspen:        mins  58207;    Therapeutic Activity:     8     mins  76307;     Gait Training:           mins  26981;     Ultrasound:     10     mins  30152;    Ionto                                  mins  76287  Self Care                            mins  80096  Canalith Repos         mins  29694  Orthotic MGMT/Train         mins  34498    Un-Timed:  Electrical Stimulation:         mins  12157 ( );  Dry Needling:          mins  13776 self-pay;  Dry  Needling:          mins  49891 self-pay  Traction          mins  40896      Timed Treatment:   38   mins   Total Treatment:     38   mins        PT SIGNATURE: Jennie Willard PT     License Number: [id number]  Electronically signed by Jennie Willard PT, 07/14/22, 11:13 AM EDT

## 2022-07-19 ENCOUNTER — TREATMENT (OUTPATIENT)
Dept: PHYSICAL THERAPY | Facility: CLINIC | Age: 27
End: 2022-07-19

## 2022-07-19 DIAGNOSIS — M25.561 RIGHT KNEE PAIN, UNSPECIFIED CHRONICITY: Primary | ICD-10-CM

## 2022-07-19 PROCEDURE — 97110 THERAPEUTIC EXERCISES: CPT | Performed by: PHYSICAL THERAPIST

## 2022-07-19 PROCEDURE — 97035 APP MDLTY 1+ULTRASOUND EA 15: CPT | Performed by: PHYSICAL THERAPIST

## 2022-07-19 NOTE — PROGRESS NOTES
Physical Therapy Daily Progress Note      Patient: Juli Luna   : 1995  Treatment Diagnosis:     ICD-10-CM ICD-9-CM   1. Right knee pain, unspecified chronicity  M25.561 719.46     Referring practitioner: DEANA Amaro  Date of Initial Visit: Type: THERAPY  Noted: 2022  Today's Date: 2022  Patient seen for 7 sessions           Subjective   Patient reports she has not had pain in her knee since last session, still feels pressure above the knee cap with certain movements.    Objective     See Exercise, Manual, and Modality Logs for complete treatment.       Assessment/Plan  Trial with KT tape to right gastroc muscle to reduce tone.  Patient performed program to tolerance. She was able to perform exercises targeted at hip/knee strengthening and dynamic stability to prevent right knee hyperextension.  She benefited from cueing for proper exercise form and to prevent compensatory patterns.  Progress per Plan of Care and Progress strengthening /stabilization /functional activity           Timed:         Manual Therapy:    6     mins  97306;     Therapeutic Exercise:    24     mins  04133;     Neuromuscular Aspen:        mins  38705;    Therapeutic Activity:          mins  84000;     Gait Training:           mins  69298;     Ultrasound:     10     mins  15767;    Ionto                                  mins  14127  Self Care                            mins  95039  Canalith Repos         mins  92521  Orthotic MGMT/Train         mins  66008    Un-Timed:  Electrical Stimulation:         mins  69417 ( );  Dry Needling:          mins  90285 self-pay;  Dry Needling:          mins  40169 self-pay  Traction          mins  29285      Timed Treatment:   40   mins   Total Treatment:     40   mins        PT SIGNATURE: Jennie Willard PT     License Number: [id number]  Electronically signed by Jennie Willard PT, 22, 11:21 AM EDT

## 2022-07-21 ENCOUNTER — TREATMENT (OUTPATIENT)
Dept: PHYSICAL THERAPY | Facility: CLINIC | Age: 27
End: 2022-07-21

## 2022-07-21 DIAGNOSIS — M25.561 RIGHT KNEE PAIN, UNSPECIFIED CHRONICITY: Primary | ICD-10-CM

## 2022-07-21 PROCEDURE — 97530 THERAPEUTIC ACTIVITIES: CPT | Performed by: PHYSICAL THERAPIST

## 2022-07-21 PROCEDURE — 97110 THERAPEUTIC EXERCISES: CPT | Performed by: PHYSICAL THERAPIST

## 2022-07-21 NOTE — PROGRESS NOTES
Physical Therapy Daily Progress Note      Patient: Juli Luna   : 1995  Treatment Diagnosis:     ICD-10-CM ICD-9-CM   1. Right knee pain, unspecified chronicity  M25.561 719.46     Referring practitioner: DEANA Amaro  Date of Initial Visit: Type: THERAPY  Noted: 2022  Today's Date: 2022  Patient seen for 8 sessions           Subjective   Patient reports she had increased knee pain about 30 minutes after last session with walking which continued through the rest of the day.  States she took the diclofenac but it didn't help that day.  States yesterday and so far today she has felt fine- no pain.  States she also feels she was just having an off day last session-reports she also had difficulty with witting-poor penmanship and pain in her hand with writing.    Objective     See Exercise, Manual, and Modality Logs for complete treatment.       Assessment/Plan  Patient performed program with no adverse symptoms.  She did benefit from cueing for proper exercise technique and to prevent compensatory patterns.  Discontinued kickstand squats due to possible trigger for increased pain symptoms.  Also discontinued US as it was not providing any additional symptom relief.  Used ice post treatment this session.  Progress per Plan of Care and Progress strengthening /stabilization /functional activity           Timed:         Manual Therapy:         mins  89142;     Therapeutic Exercise:    30     mins  74938;     Neuromuscular Aspen:        mins  46350;    Therapeutic Activity:     13     mins  61963;     Gait Training:           mins  29500;     Ultrasound:          mins  02889;    Ionto                                  mins  55433  Self Care                            mins  39268  Canalith Repos         mins  50076  Orthotic MGMT/Train         mins  64262    Un-Timed:  Electrical Stimulation:         mins  10530 ( );  Dry Needling:          mins  69171 self-pay;  Dry Needling:          mins   39875 self-pay  Traction          mins  29639      Timed Treatment:   43   mins   Total Treatment:     55   mins        PT SIGNATURE: Jennie Willard PT     License Number: [id number]  Electronically signed by Jennie Willard PT, 07/21/22, 10:33 AM EDT

## 2022-07-26 ENCOUNTER — TREATMENT (OUTPATIENT)
Dept: PHYSICAL THERAPY | Facility: CLINIC | Age: 27
End: 2022-07-26

## 2022-07-26 DIAGNOSIS — M25.561 RIGHT KNEE PAIN, UNSPECIFIED CHRONICITY: Primary | ICD-10-CM

## 2022-07-26 PROCEDURE — 97110 THERAPEUTIC EXERCISES: CPT | Performed by: PHYSICAL THERAPIST

## 2022-07-26 PROCEDURE — 97530 THERAPEUTIC ACTIVITIES: CPT | Performed by: PHYSICAL THERAPIST

## 2022-07-26 NOTE — PROGRESS NOTES
Physical Therapy Daily Progress Note      Patient: Juli Luna   : 1995  Treatment Diagnosis:     ICD-10-CM ICD-9-CM   1. Right knee pain, unspecified chronicity  M25.561 719.46     Referring practitioner: DEANA Amaro  Date of Initial Visit: Type: THERAPY  Noted: 2022  Today's Date: 2022  Patient seen for 9 sessions           Subjective   Says her R knee has been feeling good and she has had no pain since Tuesday. Says her calf feels fine and she didn't notice that the taping helped with tightness.     Objective     See Exercise, Manual, and Modality Logs for complete treatment.       Assessment/Plan  Patient tolerated increased progressive strengthening exercises with mild subjective complaint of pressure underneath the right patella with forward and backward step overs. Patient demonstrated decreased R ankle dorsiflexion with squatting motion and increased knee flexion during exercises. Patient will continue to benefit from ongoing skilled PT to increase R LE strength and stability and increase activity tolerance.     Progress per Plan of Care           Timed:         Manual Therapy:         mins  65855;     Therapeutic Exercise:    28     mins  52170;     Neuromuscular Aspen:        mins  43960;    Therapeutic Activity:     15     mins  20221;     Gait Training:           mins  37891;     Ultrasound:          mins  38916;    Ionto                                  mins  91226  Self Care                            mins  74095  Canalith Repos         mins  09167  Orthotic MGMT/Train         mins  28264    Un-Timed:  Electrical Stimulation:         mins  81068 ( );  Dry Needling:          mins  92773 self-pay;  Dry Needling:          mins  53399 self-pay  Traction          mins  18994      Timed Treatment:   43   mins   Total Treatment:     43   mins        PT SIGNATURE: Jennie Willard PT     License Number: [id number]  Electronically signed by GIBRAN TALBOT, PT Student, 22,  10:38 AM EDT    I was present in the PT Department guiding the student by approving, concurring, and confirming the skilled judgement for all services rendered. Jennie Willard PT

## 2022-07-28 ENCOUNTER — TREATMENT (OUTPATIENT)
Dept: PHYSICAL THERAPY | Facility: CLINIC | Age: 27
End: 2022-07-28

## 2022-07-28 DIAGNOSIS — M25.561 RIGHT KNEE PAIN, UNSPECIFIED CHRONICITY: Primary | ICD-10-CM

## 2022-07-28 PROCEDURE — 97110 THERAPEUTIC EXERCISES: CPT | Performed by: PHYSICAL THERAPIST

## 2022-07-28 PROCEDURE — 97530 THERAPEUTIC ACTIVITIES: CPT | Performed by: PHYSICAL THERAPIST

## 2022-07-28 NOTE — PROGRESS NOTES
Physical Therapy Re-Assessment / Re-Certification        Patient: Juli Luna   : 1995  Visit Diagnoses:     ICD-10-CM ICD-9-CM   1. Right knee pain, unspecified chronicity  M25.561 719.46     Referring practitioner: DEANA Amaro  Date of Initial Visit: Type: THERAPY  Noted: 2022  Today's Date: 2022  Patient seen for 10 sessions      Subjective:   Juli Luna reports:   Subjective Questionnaire: LEFS: 65/80  Clinical Progress: improved  Home Program Compliance: Yes  Treatment has included: therapeutic exercise, manual therapy, therapeutic activity and cryotherapy    Subjective   Patient says her knee is feeling good today and she had no soreness after last treatment. Says stairs have been easier if she takes her time and she reports working on keeping her heel down when doing step up movement.     Objective     Palpation     Additional Palpation Details  No TTP     Tenderness     Additional Tenderness Details  No TTP     Active Range of Motion      Right Knee   Flexion: 130 degrees   Extension: 8 (hyperextension) degrees     Left Knee   Flexion: 134  degrees   Extension: 8 (hyperextension) degrees      Patellar Mobility      Right Knee Patellar tendons within functional limits include the medial, lateral, superior and inferior.      Strength/Myotome Testing      Right Hip   Planes of Motion   Extension: 4  Abduction: 4+    Left hip  Planes of Motion  Extension 4  Abduction 4+     Right Knee   Flexion: 5  Extension: 5  Quadriceps contraction: good        Functional Assessment   Squat   Sitting toward left side and weight forward on feet.      Single Leg Squat      Right Leg  Positive Trendelenburg and valgus.      See Exercise, Manual, and Modality Logs for complete treatment.     Assessment/Plan  Patient demonstrated sustained AROM of R knee with reassessment. She demonstrated R quad and hamstring strength WNLs and decreased bilateral hip extension strength. She demonstrated compensatory  pattern of forward weight shift during functional squat and knee valgus during single leg squat motion. Patient demonstrated decreased single leg balance during single leg movements today. She will continue to benefit from ongoing skilled PT to increase LE strength and increase motor control during single leg stance.     Progress toward previous goals: Partially Met    Goal Review  Short Term Goals (2 wks):  1.  Patient will be independent in performance of HEP. Met.  2.  Patient will demonstrate proper wt distribution on LE with ascending/descending stairs. Met.   3.  Patient will demonstrate proper form with functional squat patterns. Progressing.   4.  Patient will have LE muscle flexibility WNLs. Progressing.       Long Term Goals (4 wks):  1.  Patient will have right hip strength of 4+/5 or better. Progressing.  2.  Patient will have LEFS score of 50/80 or better. Met.  3.  Patient will be able to perform functional squat patterns without pain limitations. Progressing.   4.  Patient will be able to transition to/from kneeling without pain limitations. Progressing.      Recommendations: Continue as planned  Timeframe: 1 month  Prognosis to achieve goals: good    PT SIGNATURE: Jennie Willard, PT     License Number: [id number]  Electronically signed by GIBRAN TALBOT, PT Student, 07/28/22, 10:35 AM EDT    I was present in the PT Department guiding the student by approving, concurring, and confirming the skilled judgement for all services rendered. Jennie Willard PT    Certification Period: 7/28/2022 thru 10/25/2022  I certify that the therapy services are furnished while this patient is under my care.  The services outlined above are required by this patient, and will be reviewed every 90 days.    Signature: __________________________________    Vandana Huang APRN   NPI: [id number]    Please sign and return via fax to [phone]. Thank you, [facility].      Timed:         Manual Therapy:          mins  11930;     Therapeutic Exercise:    30     mins  17704;     Neuromuscular Aspen:        mins  65915;    Therapeutic Activity:     15     mins  23736;     Gait Training:           mins  62059;     Ultrasound:          mins  57173;    Ionto                                  mins  22153  Self Care                            mins  26063  Canalith Repos         mins  27905  Orthotic MGMT/Train         mins  48955    Un-Timed:  Electrical Stimulation:         mins  68622 ( );  Dry Needling:          mins  62826 self-pay;  Dry Needling:          mins  45828 self-pay  Traction          mins  45665  Low Eval          mins  42409  Mod Eval          mins  21302  High Eval                            mins  34418    Timed Treatment:   45   mins   Total Treatment:     45   mins

## 2022-07-28 NOTE — PROGRESS NOTES
Physical Therapy Daily Progress Note      Patient: Juli Luna   : 1995  Treatment Diagnosis:     ICD-10-CM ICD-9-CM   1. Right knee pain, unspecified chronicity  M25.561 719.46     Referring practitioner: DEANA Amaro  Date of Initial Visit: Type: THERAPY  Noted: 2022  Today's Date: 2022  Patient seen for 10 sessions           Subjective       Objective     See Exercise, Manual, and Modality Logs for complete treatment.       Assessment/Plan    {AMB PT PLAN (SOAP Note):03051}           Timed:         Manual Therapy:    ***     mins  02159;     Therapeutic Exercise:    ***     mins  79412;     Neuromuscular Aspen:    ***    mins  87157;    Therapeutic Activity:     ***     mins  94038;     Gait Training:      ***     mins  34130;     Ultrasound:     ***     mins  84100;    Ionto                               ***   mins  26554  Self Care                       ***     mins  42941  Canalith Repos    ***     mins  86406  Orthotic MGMT/Train    ***     mins  14628    Un-Timed:  Electrical Stimulation:    ***     mins  97973 ( );  Dry Needling:     ***     mins  41510 self-pay;  Dry Needling:     ***     mins  71422 self-pay  Traction     ***     mins  85333      Timed Treatment:   ***   mins   Total Treatment:     ***   mins        PT SIGNATURE: Jennie Willard PT     License Number: [id number]  Electronically signed by GIBRAN TALBOT, PT Student, 22, 10:33 AM EDT

## 2022-08-04 ENCOUNTER — TREATMENT (OUTPATIENT)
Dept: PHYSICAL THERAPY | Facility: CLINIC | Age: 27
End: 2022-08-04

## 2022-08-04 DIAGNOSIS — M25.561 RIGHT KNEE PAIN, UNSPECIFIED CHRONICITY: Primary | ICD-10-CM

## 2022-08-04 PROCEDURE — 97110 THERAPEUTIC EXERCISES: CPT | Performed by: PHYSICAL THERAPIST

## 2022-08-04 PROCEDURE — 97530 THERAPEUTIC ACTIVITIES: CPT | Performed by: PHYSICAL THERAPIST

## 2022-08-04 NOTE — PROGRESS NOTES
Physical Therapy Daily Progress Note      Patient: Juli Luna   : 1995  Treatment Diagnosis:     ICD-10-CM ICD-9-CM   1. Right knee pain, unspecified chronicity  M25.561 719.46     Referring practitioner: DEANA Amaro  Date of Initial Visit: Type: THERAPY  Noted: 2022  Today's Date: 2022  Patient seen for 11 sessions           Subjective   Says her knee has been feeling good and she hasn't had any pain with anything. Reports she has had a little pressure in the knee with stairs, but she says she has been doing well.    Objective     See Exercise, Manual, and Modality Logs for complete treatment.       Assessment/Plan  Treatment focused on unilateral strengthening of R LE and patient tolerated exercises well today. She demonstrated decreased R LE eccentric control with lunges and decreased balance during single leg stance with toe taps and lunges. Patient required verbal cueing cueing to activate glutes during squats in order to prevent valgus. She will continue to benefit from skilled PT to increase R LE strength and control to increase activity tolerance.     Progress per Plan of Care           Timed:         Manual Therapy:         mins  41607;     Therapeutic Exercise:    28     mins  72266;     Neuromuscular Aspen:        mins  50067;    Therapeutic Activity:     15     mins  33289;     Gait Training:           mins  51990;     Ultrasound:          mins  96028;    Ionto                                  mins  55888  Self Care                            mins  00768  Canalith Repos         mins  80246  Orthotic MGMT/Train         mins  37213    Un-Timed:  Electrical Stimulation:         mins  54718 (MC );  Dry Needling:          mins  02270 self-pay;  Dry Needling:          mins  14062 self-pay  Traction          mins  89749      Timed Treatment:   43   mins   Total Treatment:     53   mins        PT SIGNATURE: Jennie Willard PT     License Number: [id number]  Electronically signed  by GIBRAN TALBOT, PT Student, 08/04/22, 3:37 PM EDT    I was present in the PT Department guiding the student by approving, concurring, and confirming the skilled judgement for all services rendered. Jennie Willard PT

## 2022-08-08 ENCOUNTER — TREATMENT (OUTPATIENT)
Dept: PHYSICAL THERAPY | Facility: CLINIC | Age: 27
End: 2022-08-08

## 2022-08-08 DIAGNOSIS — Z98.890 H/O CRANIOTOMY: ICD-10-CM

## 2022-08-08 DIAGNOSIS — R26.9 ABNORMALITY OF GAIT: Primary | ICD-10-CM

## 2022-08-08 DIAGNOSIS — Q28.3 CAVERNOUS MALFORMATION: ICD-10-CM

## 2022-08-08 PROCEDURE — 97112 NEUROMUSCULAR REEDUCATION: CPT | Performed by: PHYSICAL THERAPIST

## 2022-08-08 NOTE — PROGRESS NOTES
Recertification    Name: Juli Luna  Date: 08/08/2022  Diagnosis/ICD-10 Code:  Abnormality of gait [R26.9]  Referring practitioner: DEANA Amaro  Date of Initial Visit: 4/13/2022  Visit #: 11  Subjective:   Juli Luna reports: I have been working on my knee and I would like to keep working on my balance. I still cannot ride a bike. I have trouble with stairs and I have stairs at work I have to use frequently daily.   Subjective Questionnaire: ABC 97% balance confidence, was 93%  Clinical Progress: improved  Home Program Compliance: Yes  Treatment has included: therapeutic exercise, neuromuscular re-education, therapeutic activity and gait training  Objective:   Objective      Computerized Dynamic Posturography  CDP: Other (Unilateral Stance Test: fail all trials bilaterally)  Composite Equilibrium Score: 76, WNL  Sensory Analysis WDL: Within Defined Limits  Strategy Analysis: Ankle Dominant     Unilateral Stance Test: fail all trials bilaterally. Avg. Stance time bilaterally is 3 sec EO.   DGI: 22/24, not at risk for falls  See NeuroCom custom training report for today's treatment.    Assessment:   Functional Limitations: Difficulty moving, Walking, Decreased ability to perform ADL's, Decreased ability to perform critical demands of job  Patient is testing normal on the SOT but failed all trials of unilateral stance testing. Her SL balance and coordination are affected by h/o craniotomy for Cavernous malformation. She has a normal functioning vestibular system. Gait mechanics are unremarkable. Additional skilled therapy is indicated to address listed functional limitations.   Plan:   PT Interventions: Therapeutic exercise - strengthening, Balance Training, Home Exercise Program, Retraining of Balance Strategies  Progress toward previous goals: Partially Met   Short Term Goals (4 wks):  1.  Patient will have increased SLS time of 20 sec. Bilaterally. NOT MET  2.  Patient will be able to perform dynamic  standing reciprocal UE/LE movement patterns without LOB. MET   3.  Patient will be able to carry books/folders/bag while navigating stairs without miss steps/LOB. PROGRESSING  4.  Complete NeuroCom balance assessment.MET     Long Term Goals (12 wks):  1.  Patient will have improved DGI score of 24.  2.  Patient will be able to perform dynamic SLS activities without LOB. NOT MET   3.  Patient will demonstrate improved core strength/trunk coordination to maintain sitting balance on unstable surface. MET   4.  Patient will be able to use elliptical machine for 5 minutes with good balance and coordination. MET  Recommendations: Continue as planned  Timeframe: 3 months, 1 x a week  Prognosis to achieve goals: good    08/08/2022 Treatments:     NMR 68633 38 minutes    Timed Code Treatment: 38   Minutes     Total Treatment Time: 45      Minutes      PT Signature: Esperanza Oneil PT, DPT, CHT, JENNIFER   [place] License #: [id number]    Based upon review of the patient's progress and continued therapy plan, it is my medical opinion that Juli Luna should continue physical therapy treatment at [facility]  [address] [place] [id number] [phone].    Signature:  Vandana Huang APRN

## 2022-08-09 ENCOUNTER — TELEPHONE (OUTPATIENT)
Dept: PHYSICAL THERAPY | Facility: CLINIC | Age: 27
End: 2022-08-09

## 2022-08-17 ENCOUNTER — TREATMENT (OUTPATIENT)
Dept: PHYSICAL THERAPY | Facility: CLINIC | Age: 27
End: 2022-08-17

## 2022-08-17 DIAGNOSIS — M25.561 RIGHT KNEE PAIN, UNSPECIFIED CHRONICITY: Primary | ICD-10-CM

## 2022-08-17 PROCEDURE — 97530 THERAPEUTIC ACTIVITIES: CPT | Performed by: PHYSICAL THERAPIST

## 2022-08-17 PROCEDURE — 97110 THERAPEUTIC EXERCISES: CPT | Performed by: PHYSICAL THERAPIST

## 2022-08-17 NOTE — PROGRESS NOTES
Physical Therapy Daily Progress Note      Patient: Juli Luna   : 1995  Treatment Diagnosis:     ICD-10-CM ICD-9-CM   1. Right knee pain, unspecified chronicity  M25.561 719.46     Referring practitioner: DEANA Amaro  Date of Initial Visit: Type: THERAPY  Noted: 2022  Today's Date: 2022  Patient seen for 12 sessions           Subjective   Patient reports she is still having difficulty on stairs but primarily with going down.    Objective     See Exercise, Manual, and Modality Logs for complete treatment.       Assessment/Plan  Patient performed program with progressed parameters and intensity as well as additional core strength/dynamic stabilization.  She benefited from cueing for proper technique and to prevent compensatory patterns.  Will continue to progress as tolerated.  Progress per Plan of Care and Progress strengthening /stabilization /functional activity           Timed:         Manual Therapy:         mins  25249;     Therapeutic Exercise:    25     mins  41479;     Neuromuscular Aspen:        mins  28609;    Therapeutic Activity:     15     mins  44295;     Gait Training:           mins  70236;     Ultrasound:          mins  31100;    Ionto                                  mins  20026  Self Care                            mins  70660  Canalith Repos         mins  56273  Orthotic MGMT/Train         mins  79764    Un-Timed:  Electrical Stimulation:         mins  92377 ( );  Dry Needling:          mins  93321 self-pay;  Dry Needling:          mins  53837 self-pay  Traction          mins  74060      Timed Treatment:   40   mins   Total Treatment:     40   mins        PT SIGNATURE: Jennie Willard PT     License Number: [id number]  Electronically signed by Jennie Willard PT, 22, 3:56 PM EDT

## 2022-08-22 ENCOUNTER — TREATMENT (OUTPATIENT)
Dept: PHYSICAL THERAPY | Facility: CLINIC | Age: 27
End: 2022-08-22

## 2022-08-22 DIAGNOSIS — Z98.890 H/O CRANIOTOMY: ICD-10-CM

## 2022-08-22 DIAGNOSIS — R26.9 ABNORMALITY OF GAIT: Primary | ICD-10-CM

## 2022-08-22 DIAGNOSIS — Q28.3 CAVERNOUS MALFORMATION: ICD-10-CM

## 2022-08-22 PROCEDURE — 97112 NEUROMUSCULAR REEDUCATION: CPT | Performed by: PHYSICAL THERAPIST

## 2022-08-22 PROCEDURE — 97530 THERAPEUTIC ACTIVITIES: CPT | Performed by: PHYSICAL THERAPIST

## 2022-08-22 NOTE — PROGRESS NOTES
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   Objective           See NeuroCom custom training report.                       PROCEDURES AND MODALITIES:  Paraffin:    Moist Heat:    Ice:    E-Stim:    Ultrasound:    Ionto:   Traction:    See Exercise, Manual, and Modality Logs for complete treatment.   NMR 31016 26 minutes and Ther Act 78208 19 minutes       Timed Code Treatment: 45 Minutes  Total Treatment Time: 45 Minutes    Assessment & Plan   Patient is demonstrating imprpoved core stabilization during more ambitious balance tasks. She continues to have reduced ability to balance ad reduced proprioception on the R LE.      Progress per Plan of Care    Esperanza Oneil, PT, DPT, CHT, CIDN  Physical Therapist  [place] license # [id number]

## 2022-08-24 ENCOUNTER — TREATMENT (OUTPATIENT)
Dept: PHYSICAL THERAPY | Facility: CLINIC | Age: 27
End: 2022-08-24

## 2022-08-24 DIAGNOSIS — M25.561 RIGHT KNEE PAIN, UNSPECIFIED CHRONICITY: Primary | ICD-10-CM

## 2022-08-24 PROCEDURE — 97530 THERAPEUTIC ACTIVITIES: CPT | Performed by: PHYSICAL THERAPIST

## 2022-08-24 PROCEDURE — 97140 MANUAL THERAPY 1/> REGIONS: CPT | Performed by: PHYSICAL THERAPIST

## 2022-08-24 PROCEDURE — 97110 THERAPEUTIC EXERCISES: CPT | Performed by: PHYSICAL THERAPIST

## 2022-08-24 NOTE — PROGRESS NOTES
Physical Therapy Re-Assessment / Re-Certification        Patient: Juli Luna   : 1995  Visit Diagnoses:     ICD-10-CM ICD-9-CM   1. Right knee pain, unspecified chronicity  M25.561 719.46     Referring practitioner: DEANA Amaro  Date of Initial Visit: Type: THERAPY  Noted: 2022  Today's Date: 2022  Patient seen for 13 sessions      Subjective:   Juli Luna reports:   Subjective Questionnaire: LEFS: 65/80  Clinical Progress: improved  Home Program Compliance: Yes  Treatment has included: therapeutic exercise, manual therapy, therapeutic activity and cryotherapy    Subjective Evaluation    Pain  At best pain ratin  At worst pain ratin         Patient reports her knee has been feeling fine.  States she gets pain infrequently but it is random.  Doing better on stairs, still having soreness when going down stairs.    Objective          Tests     Right Hip   Positive Ely's.   Modified Bubba: Positive.     Functional Assessment   Squat   Sitting toward left side, left tibial anterior translation beyond toes and right tibial anterior translation beyond toes.     Single Leg Squat   Left Leg  Positive Trendelenburg, valgus and anterior tibial translation beyond toes.     Right Leg  Positive Trendelenburg, valgus and tibial anterior translation beyond toes.     Single Leg Stance   Right: 8 seconds        Palpation     Additional Palpation Details  No TTP     Tenderness     Additional Tenderness Details  No TTP     Active Range of Motion      Right Knee   Flexion: 135 degrees   Extension: 8 (hyperextension) degrees      Left Knee   Flexion: 134  degrees   Extension: 8 (hyperextension) degrees      Patellar Mobility      Right Knee Patellar tendons within functional limits include the medial, lateral, superior and inferior.      Strength/Myotome Testing      Right Hip   Planes of Motion   Extension: 4  Abduction: 4     Left hip  Planes of Motion  Extension 4+  Abduction 4     Right Knee    Flexion: 5  Extension: 5  Quadriceps contraction: good        See Exercise, Manual, and Modality Logs for complete treatment.     Assessment/Plan  Patient presents with improved knee ROM and no TTP.  She does have limited hip muscle strength and decreased LE muscle flexibility.  She responds positively to manual therapy and exercise program.  She will benefit from continued PT for strengthening and dynamic stabilization.  Progress toward previous goals: Partially Met    Goal Review  Short Term Goals (2 wks):  1.  Patient will be independent in performance of HEP. Met.  2.  Patient will demonstrate proper wt distribution on LE with ascending/descending stairs. Met.   3.  Patient will demonstrate proper form with functional squat patterns. Progressing.   4.  Patient will have LE muscle flexibility WNLs. Progressing.       Long Term Goals (4 wks):  1.  Patient will have right hip strength of 4+/5 or better. Progressing.  2.  Patient will have LEFS score of 50/80 or better. Met.  3.  Patient will be able to perform functional squat patterns without pain limitations. Progressing.   4.  Patient will be able to transition to/from kneeling without pain limitations. Progressing.                     Recommendations: Continue as planned  Timeframe: 1 month  Prognosis to achieve goals: good    PT SIGNATURE: Jennie Willard PT     License Number: [id number]  Electronically signed by Jennie Willard PT, 08/24/22, 3:49 PM EDT    Certification Period: 8/24/2022 thru 11/21/2022  I certify that the therapy services are furnished while this patient is under my care.  The services outlined above are required by this patient, and will be reviewed every 90 days.    Signature: __________________________________    Vandana Huang APRN   NPI: [id number]    Please sign and return via fax to [phone]. Thank you, [facility].      Timed:         Manual Therapy:    13     mins  87783;     Therapeutic Exercise:    15      mins  68935;     Neuromuscular Aspen:        mins  09139;    Therapeutic Activity:     20     mins  73740;     Gait Training:           mins  65551;     Ultrasound:          mins  00180;    Ionto                                  mins  86438  Self Care                            mins  11433  Canalith Repos         mins  42702  Orthotic MGMT/Train         mins  84579    Un-Timed:  Electrical Stimulation:         mins  48795 ( );  Dry Needling:          mins  30402 self-pay;  Dry Needling:          mins  60198 self-pay  Traction          mins  19307  Low Eval          mins  37570  Mod Eval          mins  30453  High Eval                            mins  23058    Timed Treatment:   48   mins   Total Treatment:     48   mins

## 2022-08-29 ENCOUNTER — TREATMENT (OUTPATIENT)
Dept: PHYSICAL THERAPY | Facility: CLINIC | Age: 27
End: 2022-08-29

## 2022-08-29 DIAGNOSIS — Q28.3 CAVERNOUS MALFORMATION: ICD-10-CM

## 2022-08-29 DIAGNOSIS — R26.9 ABNORMALITY OF GAIT: Primary | ICD-10-CM

## 2022-08-29 DIAGNOSIS — Z98.890 H/O CRANIOTOMY: ICD-10-CM

## 2022-08-29 PROCEDURE — 97112 NEUROMUSCULAR REEDUCATION: CPT | Performed by: PHYSICAL THERAPIST

## 2022-08-29 PROCEDURE — 97110 THERAPEUTIC EXERCISES: CPT | Performed by: PHYSICAL THERAPIST

## 2022-08-30 NOTE — PROGRESS NOTES
Vestibular Daily Progress Note    Visit#:13  Subjective   I have been doing ok with stairs lately. I have trouble with coordination on my entire R side.   Objective              See [employer] custom training report.                  PROCEDURES AND MODALITIES:  Paraffin:    Moist Heat:    Ice:    E-Stim:    Ultrasound:    Ionto:   Traction:    See Exercise, Manual, and Modality Logs for complete treatment.   Therapy Exercise 93430 15 minutes and NMR 53078 28 minutes       Timed Code Treatment: 43 Minutes  Total Treatment Time: 45 Minutes    Assessment & Plan   Patient is demonstrating improved strategies for balance like holding core tight and using her vision. SL balance on the R remians poor however SL balance on the L LE is improved. Will continue to progress as tolerated.    Progress per Plan of Care    Esperanza Oneil, PT, DPT, CHT, CIDN  Physical Therapist  [place] license # [id number]

## 2022-08-31 ENCOUNTER — TREATMENT (OUTPATIENT)
Dept: PHYSICAL THERAPY | Facility: CLINIC | Age: 27
End: 2022-08-31

## 2022-08-31 DIAGNOSIS — M25.561 RIGHT KNEE PAIN, UNSPECIFIED CHRONICITY: Primary | ICD-10-CM

## 2022-08-31 PROCEDURE — 97530 THERAPEUTIC ACTIVITIES: CPT | Performed by: PHYSICAL THERAPIST

## 2022-08-31 PROCEDURE — 97110 THERAPEUTIC EXERCISES: CPT | Performed by: PHYSICAL THERAPIST

## 2022-08-31 NOTE — PROGRESS NOTES
Physical Therapy Daily Progress Note      Patient: Juli Luna   : 1995  Treatment Diagnosis:     ICD-10-CM ICD-9-CM   1. Right knee pain, unspecified chronicity  M25.561 719.46     Referring practitioner: DEANA Amaro  Date of Initial Visit: Type: THERAPY  Noted: 2022  Today's Date: 2022  Patient seen for 14 sessions           Subjective   Patient reports she has been more steady on stairs, not having any pain in her knee today.    Objective     See Exercise, Manual, and Modality Logs for complete treatment.       Assessment/Plan  Patient performed program with progressed intensity and addition of advanced core/hip girdle strengthening exercises.  Benefited from cueing to technique and to prevent compensatory patterns.  Will continue to progress as tolerated.  Progress per Plan of Care and Progress strengthening /stabilization /functional activity           Timed:         Manual Therapy:         mins  65886;     Therapeutic Exercise:    30     mins  62318;     Neuromuscular Aspen:        mins  32714;    Therapeutic Activity:     10     mins  27967;     Gait Training:           mins  81634;     Ultrasound:          mins  52689;    Ionto                                  mins  17506  Self Care                            mins  38904  Canalith Repos         mins  20608  Orthotic MGMT/Train         mins  19006    Un-Timed:  Electrical Stimulation:         mins  82776 ( );  Dry Needling:          mins  80970 self-pay;  Dry Needling:          mins  74796 self-pay  Traction          mins  07892      Timed Treatment:   40   mins   Total Treatment:     40   mins        PT SIGNATURE: Jennie Willard PT     License Number: [id number]  Electronically signed by Jennie Willard PT, 22, 4:16 PM EDT

## 2022-09-06 ENCOUNTER — TREATMENT (OUTPATIENT)
Dept: PHYSICAL THERAPY | Facility: CLINIC | Age: 27
End: 2022-09-06

## 2022-09-06 DIAGNOSIS — M25.561 RIGHT KNEE PAIN, UNSPECIFIED CHRONICITY: Primary | ICD-10-CM

## 2022-09-06 PROCEDURE — 97530 THERAPEUTIC ACTIVITIES: CPT | Performed by: PHYSICAL THERAPIST

## 2022-09-06 PROCEDURE — 97110 THERAPEUTIC EXERCISES: CPT | Performed by: PHYSICAL THERAPIST

## 2022-09-06 NOTE — PROGRESS NOTES
Physical Therapy Daily Progress Note      Patient: Juli Luna   : 1995  Treatment Diagnosis:     ICD-10-CM ICD-9-CM   1. Right knee pain, unspecified chronicity  M25.561 719.46     Referring practitioner: DEANA Amaro  Date of Initial Visit: Type: THERAPY  Noted: 2022  Today's Date: 2022  Patient seen for 15 sessions           Subjective   Patient reports she has been feeling good, no knee pain.    Objective     See Exercise, Manual, and Modality Logs for complete treatment.       Assessment/Plan  Patient performed program for core and LE strengthening with no adverse symptoms.  Benefits from cueing for technique and to prevent compensatory patterns.  Will continue to progress as tolerated.  Progress per Plan of Care and Progress strengthening /stabilization /functional activity           Timed:         Manual Therapy:         mins  33986;     Therapeutic Exercise:    30     mins  30292;     Neuromuscular Aspen:        mins  83300;    Therapeutic Activity:     10     mins  32003;     Gait Training:           mins  32317;     Ultrasound:          mins  85969;    Ionto                                  mins  75904  Self Care                            mins  08449  Canalith Repos         mins  05714  Orthotic MGMT/Train         mins  37541    Un-Timed:  Electrical Stimulation:         mins  90066 ( );  Dry Needling:          mins  56428 self-pay;  Dry Needling:          mins  53165 self-pay  Traction          mins  02925      Timed Treatment:   40   mins   Total Treatment:     40   mins        PT SIGNATURE: Jennie Willard PT     License Number: [id number]  Electronically signed by Jennie Willard PT, 22, 5:10 PM EDT

## 2022-09-22 ENCOUNTER — TREATMENT (OUTPATIENT)
Dept: PHYSICAL THERAPY | Facility: CLINIC | Age: 27
End: 2022-09-22

## 2022-09-22 DIAGNOSIS — M25.561 RIGHT KNEE PAIN, UNSPECIFIED CHRONICITY: Primary | ICD-10-CM

## 2022-09-22 PROCEDURE — 97110 THERAPEUTIC EXERCISES: CPT | Performed by: PHYSICAL THERAPIST

## 2022-09-22 PROCEDURE — 97530 THERAPEUTIC ACTIVITIES: CPT | Performed by: PHYSICAL THERAPIST

## 2022-09-22 NOTE — PROGRESS NOTES
Physical Therapy Re-Assessment / Re-Certification        Patient: Juli Luna   : 1995  Visit Diagnoses:     ICD-10-CM ICD-9-CM   1. Right knee pain, unspecified chronicity  M25.561 719.46     Referring practitioner: DEANA Amaro  Date of Initial Visit: Type: THERAPY  Noted: 2022  Today's Date: 2022  Patient seen for 16 sessions      Subjective:   Juli Luna reports:   Subjective Questionnaire: LEFS: 64/80  Clinical Progress: improved  Home Program Compliance: Yes  Treatment has included: therapeutic exercise, manual therapy, therapeutic activity, ultrasound and cryotherapy    Subjective   Patient reports her knee has been fine since last session but today she is having pain-hurts to stand up from sitting, stairs and getting in/out of car.  Has had back pain while she was on a trip (-)-states she was sleeping on an air mattress or couch.      Objective       Tests      Right Hip   Positive Ely's.   Modified Bubba: Positive.      Functional Assessment   Squat   Sitting toward left side, left tibial anterior translation beyond toes and right tibial anterior translation beyond toes.      Single Leg Squat   Left Leg  Positive Trendelenburg, valgus and anterior tibial translation beyond toes.      Right Leg  Positive Trendelenburg, valgus and tibial anterior translation beyond toes.      Single Leg Stance   Right: 4 seconds         Palpation     TTP right patellar tendon, inferior patella.     Active Range of Motion      Right Knee   Flexion: 132 degrees   Extension: 6 (hyperextension) degrees      Left Knee   Flexion: 134  degrees   Extension: 8 (hyperextension) degrees      Patellar Mobility      Right Knee Patellar tendons within functional limits include the medial, lateral, superior and inferior.      Strength/Myotome Testing      Right Hip   Planes of Motion   Extension: 4  Abduction: 4     Left hip  Planes of Motion  Extension 4+  Abduction 4     Right Knee   Flexion:  5  Extension: 5  Quadriceps contraction: good      See Exercise, Manual, and Modality Logs for complete treatment.     Assessment/Plan  Patient presents with pain exacerbation in her right knee.  She also presents with increased tightness in right hip flexors and calf complex muscles.  She has mild reduction in ROM, although ROM is still functional.  She also has audible and palpable PF crepitus in the right knee.  She will benefit from continued PT to address these deficits.  Progress toward previous goals: Partially Met    Goal Review  Short Term Goals (2 wks):  1.  Patient will be independent in performance of HEP. Met.  2.  Patient will demonstrate proper wt distribution on LE with ascending/descending stairs. Met.   3.  Patient will demonstrate proper form with functional squat patterns. Progressing.   4.  Patient will have LE muscle flexibility WNLs. Progressing.       Long Term Goals (4 wks):  1.  Patient will have right hip strength of 4+/5 or better. Progressing.  2.  Patient will have LEFS score of 50/80 or better. Met.  3.  Patient will be able to perform functional squat patterns without pain limitations. Progressing.   4.  Patient will be able to transition to/from kneeling without pain limitations. Progressing.       Recommendations: Continue as planned  Timeframe: 1 month  Prognosis to achieve goals: good    PT SIGNATURE: Jennie Willard PT     License Number: [id number]  Electronically signed by Jennie Willard PT, 09/22/22, 4:28 PM EDT    Certification Period: 9/22/2022 thru 12/20/2022  I certify that the therapy services are furnished while this patient is under my care.  The services outlined above are required by this patient, and will be reviewed every 90 days.    Signature: __________________________________    Vandana Huang APRN   NPI: [id number]    Please sign and return via fax to [phone]. Thank you, [facility].      Timed:         Manual Therapy:         mins   86097;     Therapeutic Exercise:    20     mins  61508;     Neuromuscular Aspen:        mins  86444;    Therapeutic Activity:     15     mins  23604;     Gait Training:           mins  16555;     Ultrasound:          mins  53351;    Ionto                                  mins  05469  Self Care                            mins  99663  Canalith Repos         mins  87814  Orthotic MGMT/Train         mins  44104    Un-Timed:  Electrical Stimulation:         mins  49008 ( );  Dry Needling:          mins  36983 self-pay;  Dry Needling:          mins  69052 self-pay  Traction          mins  61390  Low Eval          mins  57831  Mod Eval          mins  98558  High Eval                            mins  56834    Timed Treatment:   35   mins   Total Treatment:     45   mins

## 2022-09-26 ENCOUNTER — TREATMENT (OUTPATIENT)
Dept: PHYSICAL THERAPY | Facility: CLINIC | Age: 27
End: 2022-09-26

## 2022-09-26 DIAGNOSIS — Z98.890 H/O CRANIOTOMY: ICD-10-CM

## 2022-09-26 DIAGNOSIS — Q28.3 CAVERNOUS MALFORMATION: ICD-10-CM

## 2022-09-26 DIAGNOSIS — R26.9 ABNORMALITY OF GAIT: Primary | ICD-10-CM

## 2022-09-26 PROCEDURE — 97112 NEUROMUSCULAR REEDUCATION: CPT | Performed by: PHYSICAL THERAPIST

## 2022-09-26 NOTE — PROGRESS NOTES
Recertification    Name: Juli Luna  Date: 09/26/2022  Diagnosis/ICD-10 Code:  Abnormality of gait [R26.9]  Referring practitioner: DEANA Amaro  Date of Initial Visit: 4/13/2022  Visit #: 14  Subjective:   Juli Luna reports: I am still having trouble with stairs, especially when there is not a rail to hold onto.   Subjective Questionnaire: ABC: 97%  Clinical Progress: unchanged  Home Program Compliance: Yes  Treatment has included: therapeutic exercise, neuromuscular re-education and therapeutic activity  Objective:     Computerized Dynamic Posturography:  SOT: Composite Equilibrium Score: 76, WNL  Sensory Analysis WDL: Within Defined Limits  Strategy Analysis: Ankle Dominant     Limits of Stability: Reaction time decreased in forward and right directions. Movement velocity and directional control are normal for all directions. Endpoint and max excursion decreased in forward directions.     Unilateral Stance Test: performance with SLS on the L is improved. She was able to maintain SLS for full time 2/3 trials. Failed all trials of SLS on R EO/EC and EC on L.     DGI: 22/24, not at risk for falls  See [employer] custom training report for today's treatment.  Assessment:   Functional Limitations: Difficulty moving, Walking, Decreased ability to perform ADL's, Decreased ability to perform critical demands of job  Patient is demonstrating improved strategies for balance like holding core tight and using her vision. SL balance on the R remians poor however SL balance on the L LE is improved. Her SL balance and coordination are affected by h/o craniotomy for Cavernous malformation. She has a normal functioning vestibular system. Gait mechanics are unremarkable. Additional skilled therapy is indicated to address listed functional limitations.   Plan:   PT Interventions: Therapeutic exercise - strengthening, Balance Training, Home Exercise Program, Retraining of Balance Strategies  Progress toward previous goals:  Partially Met   Short Term Goals (4 wks):  1.  Patient will have increased SLS time of 20 sec. Bilaterally. NOT MET  2.  Patient will be able to perform dynamic standing reciprocal UE/LE movement patterns without LOB. MET   3.  Patient will be able to carry books/folders/bag while navigating stairs without miss steps/LOB. PROGRESSING  4.  Complete Neuro[employer] balance assessment.MET     Long Term Goals (12 wks):  1.  Patient will have improved DGI score of 24.  2.  Patient will be able to perform dynamic SLS activities without LOB. NOT MET   3.  Patient will demonstrate improved core strength/trunk coordination to maintain sitting balance on unstable surface. MET   4.  Patient will be able to use elliptical machine for 5 minutes with good balance and coordination. MET  Recommendations: Continue as planned  Timeframe: 3 months  Prognosis to achieve goals: good    09/26/2022 Treatments:     [facility] [address]771 45 minutes    Timed Code Treatment: 45   Minutes     Total Treatment Time: 45      Minutes      PT Signature: Esperanza Oneil PT, DPT, CHT, JENNIFER   [place] License #: [id number]    Based upon review of the patient's progress and continued therapy plan, it is my medical opinion that Juli Luna should continue physical therapy treatment at [facility]  [address] [place] [id number] [phone].    Signature:  Vandana Huang APRN

## 2022-09-27 ENCOUNTER — TREATMENT (OUTPATIENT)
Dept: PHYSICAL THERAPY | Facility: CLINIC | Age: 27
End: 2022-09-27

## 2022-09-27 DIAGNOSIS — M25.561 RIGHT KNEE PAIN, UNSPECIFIED CHRONICITY: Primary | ICD-10-CM

## 2022-09-27 PROCEDURE — 97110 THERAPEUTIC EXERCISES: CPT | Performed by: PHYSICAL THERAPIST

## 2022-09-27 PROCEDURE — 97530 THERAPEUTIC ACTIVITIES: CPT | Performed by: PHYSICAL THERAPIST

## 2022-09-27 NOTE — PROGRESS NOTES
Physical Therapy Daily Progress Note      Patient: Juli Luna   : 1995  Treatment Diagnosis:     ICD-10-CM ICD-9-CM   1. Right knee pain, unspecified chronicity  M25.561 719.46     Referring practitioner: DEANA Amaro  Date of Initial Visit: Type: THERAPY  Noted: 2022  Today's Date: 2022  Patient seen for 17 sessions           Subjective   Patient reports her knee is feeling better today. Reports she has some improvement on her single leg standing in her balance testing.    Objective     See Exercise, Manual, and Modality Logs for complete treatment.       Assessment/Plan  Patient performed program to tolerance with re-introduction of dynamic strengthening and dynamic balance/stability activities.  Benefits from cueing for proper exercise technique and to prevent compensatory patterns.  Progress per Plan of Care and Progress strengthening /stabilization /functional activity           Timed:         Manual Therapy:         mins  96342;     Therapeutic Exercise:    15(5)     mins  64981 (concurrent);     Neuromuscular Aspen:        mins  99930;    Therapeutic Activity:     15     mins  41253;     Gait Training:           mins  08928;     Ultrasound:          mins  43032;    Ionto                                  mins  22656  Self Care                            mins  22692  Canalith Repos         mins  55488  Orthotic MGMT/Train         mins  06033    Un-Timed:  Electrical Stimulation:         mins  15092 ( );  Dry Needling:          mins  89551 self-pay;  Dry Needling:          mins  44840 self-pay  Traction          mins  09335      Timed Treatment:   30   mins   Total Treatment:     35   mins        PT SIGNATURE: Jennie Willard PT     License Number: [id number]  Electronically signed by Jennie Willard PT, 22, 4:08 PM EDT

## 2022-09-29 ENCOUNTER — TELEPHONE (OUTPATIENT)
Dept: NEUROSURGERY | Facility: CLINIC | Age: 27
End: 2022-09-29

## 2022-09-29 NOTE — TELEPHONE ENCOUNTER
Pt called to ask about what Dr. Anne thinks would be the best recommendation to deal with hand numbness and tingling. She states she wakes up and her hands are numb. They get better as the day goes on but she still experiences some hand tingling. Her PCP recommends her to see a hand specialist but she wants Dr. Anne's recommendations. PT number is [phone].

## 2022-09-30 ENCOUNTER — TELEPHONE (OUTPATIENT)
Dept: NEUROSURGERY | Facility: CLINIC | Age: 27
End: 2022-09-30

## 2022-09-30 NOTE — TELEPHONE ENCOUNTER
See previous message. I returned patient's call.  Needing to speak to her about referral to hand surgeon.

## 2022-09-30 NOTE — TELEPHONE ENCOUNTER
Patient is wanting to know if Dr. Anne can refer to Hand doctor. She is having numbness and tingliness in hand. Patient does not have a PMD.

## 2022-09-30 NOTE — TELEPHONE ENCOUNTER
Per Dr. Anne it is ok for her to see the hand specialist. She should make sure they are aware of her health and medical history.     (Ok for Hub to read this message to patient)

## 2022-10-03 ENCOUNTER — TREATMENT (OUTPATIENT)
Dept: PHYSICAL THERAPY | Facility: CLINIC | Age: 27
End: 2022-10-03

## 2022-10-03 DIAGNOSIS — R26.9 ABNORMALITY OF GAIT: Primary | ICD-10-CM

## 2022-10-03 DIAGNOSIS — Q28.3 CAVERNOUS MALFORMATION: ICD-10-CM

## 2022-10-03 DIAGNOSIS — Z98.890 H/O CRANIOTOMY: ICD-10-CM

## 2022-10-03 PROCEDURE — 97112 NEUROMUSCULAR REEDUCATION: CPT | Performed by: PHYSICAL THERAPIST

## 2022-10-03 PROCEDURE — 97110 THERAPEUTIC EXERCISES: CPT | Performed by: PHYSICAL THERAPIST

## 2022-10-03 NOTE — PROGRESS NOTES
Vestibular Daily Progress Note    Visit#:15  Subjective    No new compliants.  Objective                       See [employer] custom training report.          PROCEDURES AND MODALITIES:  Paraffin:    Moist Heat:    Ice:    E-Stim:    Ultrasound:    Ionto:   Traction:    See Exercise, Manual, and Modality Logs for complete treatment.   Therapy Exercise 00324 20 minutes and NMR 29087 25 minutes       Timed Code Treatment: 45 Minutes  Total Treatment Time: 45 Minutes    Assessment & Plan   Patient continues to have balance deficits and lack of coordination and strength in the R LE. Will continue to incorporate core strengthening into balance challenges.     Progress per Plan of Care    Esperanza Oneil, PT, DPT, CHT, CIDN  Physical Therapist  [place] license # [id number]

## 2022-10-04 ENCOUNTER — TELEPHONE (OUTPATIENT)
Dept: NEUROSURGERY | Facility: CLINIC | Age: 27
End: 2022-10-04

## 2022-10-05 ENCOUNTER — TREATMENT (OUTPATIENT)
Dept: PHYSICAL THERAPY | Facility: CLINIC | Age: 27
End: 2022-10-05

## 2022-10-05 DIAGNOSIS — M25.561 RIGHT KNEE PAIN, UNSPECIFIED CHRONICITY: Primary | ICD-10-CM

## 2022-10-05 PROCEDURE — 97110 THERAPEUTIC EXERCISES: CPT | Performed by: PHYSICAL THERAPIST

## 2022-10-05 PROCEDURE — 97530 THERAPEUTIC ACTIVITIES: CPT | Performed by: PHYSICAL THERAPIST

## 2022-10-05 NOTE — PROGRESS NOTES
Physical Therapy Daily Progress Note      Patient: Juli Luna   : 1995  Treatment Diagnosis:     ICD-10-CM ICD-9-CM   1. Right knee pain, unspecified chronicity  M25.561 719.46     Referring practitioner: DEANA Amaro  Date of Initial Visit: Type: THERAPY  Noted: 2022  Today's Date: 10/5/2022  Patient seen for 18 sessions           Subjective   Patient reports she has not been having any limiting knee pain.  States she feels her balance is improving but notes she does loose her balance when she is distracted.  States she had a loss of balance which she was able to self correct when she was swinging her back pack onto her shoulder.    Objective     See Exercise, Manual, and Modality Logs for complete treatment.       Assessment/Plan  Patient performed program for strengthening and functional dynamic balance and single leg stability.  She benefits from cueing for proper technique and to prevent compensatory patterns.  She was able to perform program with progressed intensity with no adverse symptoms.  Progress per Plan of Care and Progress strengthening /stabilization /functional activity           Timed:         Manual Therapy:         mins  96815;     Therapeutic Exercise:    30     mins  16702;     Neuromuscular Aspen:        mins  62255;    Therapeutic Activity:     25     mins  60177;     Gait Training:           mins  32997;     Ultrasound:          mins  27284;    Ionto                                  mins  30084  Self Care                            mins  61609  Canalith Repos         mins  27085  Orthotic MGMT/Train         mins  45411    Un-Timed:  Electrical Stimulation:         mins  05290 ( );  Dry Needling:          mins  84328 self-pay;  Dry Needling:          mins  88198 self-pay  Traction          mins  69029      Timed Treatment:   55   mins   Total Treatment:     60   mins        PT SIGNATURE: Jennie Willard PT     License Number: [id number]  Electronically signed by  Jennie Willard, PT, 10/05/22, 3:42 PM EDT

## 2022-10-10 ENCOUNTER — TREATMENT (OUTPATIENT)
Dept: PHYSICAL THERAPY | Facility: CLINIC | Age: 27
End: 2022-10-10

## 2022-10-10 DIAGNOSIS — Z98.890 H/O CRANIOTOMY: ICD-10-CM

## 2022-10-10 DIAGNOSIS — R26.9 ABNORMALITY OF GAIT: Primary | ICD-10-CM

## 2022-10-10 DIAGNOSIS — Q28.3 CAVERNOUS MALFORMATION: ICD-10-CM

## 2022-10-10 PROCEDURE — 97110 THERAPEUTIC EXERCISES: CPT | Performed by: PHYSICAL THERAPIST

## 2022-10-10 PROCEDURE — 97112 NEUROMUSCULAR REEDUCATION: CPT | Performed by: PHYSICAL THERAPIST

## 2022-10-10 NOTE — PROGRESS NOTES
Physical Therapy Daily Progress Note    Visit #: 16  Juli Luna reports: no new complaints.  Pain Scale (0-10):     Subjective       Objective   See Exercise, Manual, and Modality Logs for complete treatment.     PROCEDURES AND MODALITIES:  Paraffin:   pre-rx  Moist Heat:    Ice:   post-rx  E-Stim:    Ultrasound:    Ionto:   Traction:    Dry Needling:         Therapy Exercise 58522 11 minutes and NMR 40388 29 minutes     Timed Code Treatment: 40 Minutes  Total Treatment Time: 40 Minutes    Assessment/Plan  Patient continues to demonstrate small improvements in each session especially with R SL balance activity. When cued to hold TA tight and focus eyes on a fixed point she demonstrates a significant improvement in balance.     Progress per Plan of Care      Esperanza Oneil PT, DPT, CHT, CONSTANCEN  Physical Therapist  [place] License # [id number]

## 2022-10-12 ENCOUNTER — TREATMENT (OUTPATIENT)
Dept: PHYSICAL THERAPY | Facility: CLINIC | Age: 27
End: 2022-10-12

## 2022-10-12 DIAGNOSIS — M25.561 RIGHT KNEE PAIN, UNSPECIFIED CHRONICITY: Primary | ICD-10-CM

## 2022-10-12 PROCEDURE — 97530 THERAPEUTIC ACTIVITIES: CPT | Performed by: PHYSICAL THERAPIST

## 2022-10-12 PROCEDURE — 97110 THERAPEUTIC EXERCISES: CPT | Performed by: PHYSICAL THERAPIST

## 2022-10-12 NOTE — PROGRESS NOTES
Physical Therapy Daily Progress Note      Patient: Juli Luna   : 1995  Treatment Diagnosis:     ICD-10-CM ICD-9-CM   1. Right knee pain, unspecified chronicity  M25.561 719.46     Referring practitioner: DEANA Amaro  Date of Initial Visit: Type: THERAPY  Noted: 2022  Today's Date: 10/12/2022  Patient seen for 19 sessions           Subjective   Patient reports she was having some intermittent pain in her knee last night on stairs, getting in/out of the car.  States her knee feels fine today.    Objective     See Exercise, Manual, and Modality Logs for complete treatment.       Assessment/Plan  Patient was able to completed advanced dynamic balance activities.  Required assist for LOB during exercises.  Her stability did improved with repetitions and cueing for technique and balance strategies.  Will continue to progress as tolerated.   Progress per Plan of Care           Timed:         Manual Therapy:         mins  68682;     Therapeutic Exercise:    14     mins  01285;     Neuromuscular Aspen:        mins  72463;    Therapeutic Activity:     30     mins  62175;     Gait Training:           mins  03759;     Ultrasound:          mins  46915;    Ionto                                  mins  89018  Self Care                            mins  72560  Canalith Repos         mins  05959  Orthotic MGMT/Train         mins  52828    Un-Timed:  Electrical Stimulation:         mins  67407 ( );  Dry Needling:          mins  67097 self-pay;  Dry Needling:          mins  08929 self-pay  Traction          mins  15486      Timed Treatment:   44   mins   Total Treatment:     54   mins        PT SIGNATURE: Jennie Willard PT     License Number: [id number]  Electronically signed by Jennie Willard PT, 10/12/22, 3:26 PM EDT

## 2022-10-19 ENCOUNTER — TREATMENT (OUTPATIENT)
Dept: PHYSICAL THERAPY | Facility: CLINIC | Age: 27
End: 2022-10-19

## 2022-10-19 DIAGNOSIS — M25.561 RIGHT KNEE PAIN, UNSPECIFIED CHRONICITY: Primary | ICD-10-CM

## 2022-10-19 PROCEDURE — 97110 THERAPEUTIC EXERCISES: CPT | Performed by: PHYSICAL THERAPIST

## 2022-10-19 PROCEDURE — 97530 THERAPEUTIC ACTIVITIES: CPT | Performed by: PHYSICAL THERAPIST

## 2022-10-19 NOTE — PROGRESS NOTES
Physical Therapy Daily Progress Note      Patient: Juli Luna   : 1995  Treatment Diagnosis:     ICD-10-CM ICD-9-CM   1. Right knee pain, unspecified chronicity  M25.561 719.46     Referring practitioner: DEANA Amaro  Date of Initial Visit: Type: THERAPY  Noted: 2022  Today's Date: 10/19/2022  Patient seen for 20 sessions           Subjective   Patient reports she had some low back and right knee discomfort later in the evening after last session.  Reports she was fine the next day.  Reports she took 3 one mile dog walks and had knee pain that evening.      Objective     See Exercise, Manual, and Modality Logs for complete treatment.       Assessment/Plan  Patient had TTP of the right knee medial joint line and proximal MCL region.  She had negative MCL provocation tests.  Session focused on stretching.  Discussed possible contributing factors to her knee pain. Will re-assess next session.  Progress per Plan of Care and Progress strengthening /stabilization /functional activity           Timed:         Manual Therapy:         mins  77976;     Therapeutic Exercise:    15     mins  52261;     Neuromuscular Aspen:        mins  79719;    Therapeutic Activity:     10     mins  50052;     Gait Training:           mins  49916;     Ultrasound:          mins  24080;    Ionto                                  mins  58744  Self Care                            mins  65604  Canalith Repos         mins  87093  Orthotic MGMT/Train         mins  77536    Un-Timed:  Electrical Stimulation:         mins  94172 ( );  Dry Needling:          mins  62634 self-pay;  Dry Needling:          mins  55654 self-pay  Traction          mins  70346      Timed Treatment:   25   mins   Total Treatment:     30   mins        PT SIGNATURE: Jennie Willard PT     License Number: [id number]  Electronically signed by Jennie Willard PT, 10/19/22, 3:46 PM EDT

## 2022-10-24 ENCOUNTER — TREATMENT (OUTPATIENT)
Dept: PHYSICAL THERAPY | Facility: CLINIC | Age: 27
End: 2022-10-24

## 2022-10-24 DIAGNOSIS — Q28.3 CAVERNOUS MALFORMATION: ICD-10-CM

## 2022-10-24 DIAGNOSIS — R26.9 ABNORMALITY OF GAIT: Primary | ICD-10-CM

## 2022-10-24 DIAGNOSIS — Z98.890 H/O CRANIOTOMY: ICD-10-CM

## 2022-10-24 PROCEDURE — 97112 NEUROMUSCULAR REEDUCATION: CPT | Performed by: PHYSICAL THERAPIST

## 2022-10-24 PROCEDURE — 97110 THERAPEUTIC EXERCISES: CPT | Performed by: PHYSICAL THERAPIST

## 2022-10-24 PROCEDURE — 97530 THERAPEUTIC ACTIVITIES: CPT | Performed by: PHYSICAL THERAPIST

## 2022-10-24 NOTE — PROGRESS NOTES
Vestibular Daily Progress Note    Visit#:17  Subjective   I feel like my balance is slowly getting better but I still have difficulty daily with my balance.   Objective                    See [employer] custom training report.          PROCEDURES AND MODALITIES:  Paraffin:    Moist Heat:    Ice:    E-Stim:    Ultrasound:    Ionto:   Traction:    See Exercise, Manual, and Modality Logs for complete treatment.   Therapy Exercise 07247 10 minutes, NMR 32878 22 minutes and Ther Act 24034 13 minutes       Timed Code Treatment: 45 Minutes  Total Treatment Time: 45 Minutes    Assessment & Plan   Patient is demonstrating improvement in ability to self correct posture with balance tasks. Continues to have difficulty with balance on the R LE and with sharpened stances and tandem ambulation. Patient continues to demonstrate slow improvement ability and use of strategies.      Progress per Plan of Care    Esperanza Oneil, PT, DPT, CHT, CONSTANCEN  Physical Therapist  [place] license # [id number]

## 2022-10-26 ENCOUNTER — TELEPHONE (OUTPATIENT)
Dept: PHYSICAL THERAPY | Facility: CLINIC | Age: 27
End: 2022-10-26

## 2022-10-26 ENCOUNTER — TREATMENT (OUTPATIENT)
Dept: PHYSICAL THERAPY | Facility: CLINIC | Age: 27
End: 2022-10-26

## 2022-10-26 DIAGNOSIS — M25.561 RIGHT KNEE PAIN, UNSPECIFIED CHRONICITY: Primary | ICD-10-CM

## 2022-10-26 PROCEDURE — 97530 THERAPEUTIC ACTIVITIES: CPT | Performed by: PHYSICAL THERAPIST

## 2022-10-26 PROCEDURE — 97110 THERAPEUTIC EXERCISES: CPT | Performed by: PHYSICAL THERAPIST

## 2022-10-26 PROCEDURE — 97116 GAIT TRAINING THERAPY: CPT | Performed by: PHYSICAL THERAPIST

## 2022-10-26 NOTE — PROGRESS NOTES
PHYSICAL THERAPY Re-ASSESSMENT & DISCHARGE SUMMARY      Patient: Juli Luna   : 1995  Diagnosis/ICD-10 Code:  Right knee pain, unspecified chronicity [M25.561]  Referring practitioner: DEANA Amaro  Date of Initial Visit: Type: THERAPY  Noted: 2022  Today's Date: 10/26/2022  Patient seen for 21 sessions      Subjective:   Juli Luna reports:   Subjective Questionnaire: LEFS: 64/80 (prior assessment)  Clinical Progress: improved  Home Program Compliance: Yes  Treatment has included: therapeutic exercise, manual therapy, therapeutic activity, ultrasound and cryotherapy    Subjective  Patient reports she has not been having any issues with her knee with routine daily activities.  States she had some difficulty with walking the hills in the park for the [place] but feels it was in part due to her balance with walking hills in the dark.  Reports she will be seeing an orthopedic doctor next month.      Objective        Tests      Right Hip   Negative Ely's.   Modified Bubba: Negative.      Functional Assessment   Squat     Good symmetrical form      Palpation     No TTP     Active Range of Motion      Right Knee   Flexion: 132 degrees   Extension: 6 (hyperextension) degrees      Left Knee   Flexion: 134  degrees   Extension: 8 (hyperextension) degrees      Patellar Mobility      Right Knee Patellar mobility WNL       Strength/Myotome Testing      Right Hip   Planes of Motion   Extension: 4+  Abduction: 4+     Left hip  Planes of Motion  Extension 4+  Abduction 4+     Right Knee   Flexion: 5  Extension: 5  Quadriceps contraction: good    Assessment/Plan   Patient demonstrates improved functional movement patterns and no pain provocation with exercises nor joint clearing tests.  She does still have periods of right calf muscle hypertonicity and clonus that alters her gait pattern-most notably when descending stairs.  She is independent in her HEP and understands how to adjust  program based on her physical needs.  All PT goals have been met for this POC.  Trial with forefoot wedge did minimize right knee hyperextension with ambulation, did not produce a significantly positive impact with SLS/balance activities upon initial trial.  Discussed possible benefits of using a forefoot wedge as well as a consultation with a neurologist regarding botox injections in the calf muscles.  Patient to discuss with her doctors.  Progress toward previous goals: All Met    Short Term Goals:  1.  Patient will be independent in performance of HEP. Met.  2.  Patient will demonstrate proper wt distribution on LE with ascending/descending stairs. Met.   3.  Patient will demonstrate proper form with functional squat patterns. Met   4.  Patient will have LE muscle flexibility WNLs. Met       Long Term Goals:  1.  Patient will have right hip strength of 4+/5 or better. Met  2.  Patient will have LEFS score of 50/80 or better. Met.  3.  Patient will be able to perform functional squat patterns without pain limitations. Met   4.  Patient will be able to transition to/from kneeling without pain limitations. Met                       Recommendations: Discharge to [facility]    PT SIGNATURE: Jennie Willard PT     License Number: [id number]  Electronically signed by Jennie Willard PT, 10/26/22, 1:16 PM EDT    Timed:         Manual Therapy:         mins  99220;     Therapeutic Exercise:    10     mins  54871;     Neuromuscular [place]:        mins  30965;    Therapeutic Activity:     20     mins  56407;     Gait Training:      10     mins  43673;     Ultrasound:          mins  48305;    Ionto                                  mins  14608  Self Care                            mins  73894  Canalith Repos         mins  98975  Orthotic MGMT/Train         mins  60767    Un-Timed:  Electrical Stimulation:         mins  94708 ( );  Dry Needling:          mins  20560 self-pay;  Dry Needling:          mins  20561 self-pay  Traction           mins  37492  Low Eval          mins  87426  Mod Eval          mins  94577  High Eval                            mins  25277    Timed Treatment:   40   mins   Total Treatment:     40   mins

## 2022-10-31 ENCOUNTER — TREATMENT (OUTPATIENT)
Dept: PHYSICAL THERAPY | Facility: CLINIC | Age: 27
End: 2022-10-31

## 2022-10-31 DIAGNOSIS — Q28.3 CAVERNOUS MALFORMATION: ICD-10-CM

## 2022-10-31 DIAGNOSIS — R26.9 ABNORMALITY OF GAIT: Primary | ICD-10-CM

## 2022-10-31 DIAGNOSIS — Z98.890 H/O CRANIOTOMY: ICD-10-CM

## 2022-10-31 PROCEDURE — 97112 NEUROMUSCULAR REEDUCATION: CPT | Performed by: PHYSICAL THERAPIST

## 2022-10-31 PROCEDURE — 97530 THERAPEUTIC ACTIVITIES: CPT | Performed by: PHYSICAL THERAPIST

## 2022-11-07 ENCOUNTER — TREATMENT (OUTPATIENT)
Dept: PHYSICAL THERAPY | Facility: CLINIC | Age: 27
End: 2022-11-07

## 2022-11-07 DIAGNOSIS — Z98.890 H/O CRANIOTOMY: ICD-10-CM

## 2022-11-07 DIAGNOSIS — Q28.3 CAVERNOUS MALFORMATION: ICD-10-CM

## 2022-11-07 DIAGNOSIS — R26.9 ABNORMALITY OF GAIT: Primary | ICD-10-CM

## 2022-11-07 PROCEDURE — 97110 THERAPEUTIC EXERCISES: CPT | Performed by: PHYSICAL THERAPIST

## 2022-11-07 PROCEDURE — 97530 THERAPEUTIC ACTIVITIES: CPT | Performed by: PHYSICAL THERAPIST

## 2022-11-07 PROCEDURE — 97112 NEUROMUSCULAR REEDUCATION: CPT | Performed by: PHYSICAL THERAPIST

## 2022-11-07 NOTE — PROGRESS NOTES
Vestibular Daily Progress Note    Visit#:19  Subjective   No new complaints.  Objective                             PROCEDURES AND MODALITIES:  Paraffin:    Moist Heat:    Ice:    E-Stim:    Ultrasound:    Ionto:   Traction:    See Exercise, Manual, and Modality Logs for complete treatment.   Therapy Exercise 06362 15 minutes, NMR 47266 18 minutes and Ther Act 48336 12 minutes       Timed Code Treatment: 45 Minutes  Total Treatment Time: 45 Minutes    Assessment & Plan   Patient is demonstrating improved core contraction to stabilize with balance tasks. Continues to have significant coordination and balance deficit in the R LE.     Progress per Plan of Care    Esperanza Oneil PT, DPT, CHT, CIDN  Physical Therapist  [place] license # [id number]

## 2022-11-14 ENCOUNTER — TREATMENT (OUTPATIENT)
Dept: PHYSICAL THERAPY | Facility: CLINIC | Age: 27
End: 2022-11-14

## 2022-11-14 DIAGNOSIS — R26.9 ABNORMALITY OF GAIT: Primary | ICD-10-CM

## 2022-11-14 DIAGNOSIS — Z98.890 H/O CRANIOTOMY: ICD-10-CM

## 2022-11-14 DIAGNOSIS — Q28.3 CAVERNOUS MALFORMATION: ICD-10-CM

## 2022-11-14 PROCEDURE — 97110 THERAPEUTIC EXERCISES: CPT | Performed by: PHYSICAL THERAPIST

## 2022-11-14 PROCEDURE — 97112 NEUROMUSCULAR REEDUCATION: CPT | Performed by: PHYSICAL THERAPIST

## 2022-11-14 PROCEDURE — 97530 THERAPEUTIC ACTIVITIES: CPT | Performed by: PHYSICAL THERAPIST

## 2022-11-14 NOTE — PROGRESS NOTES
Vestibular Daily Progress Note    Visit#:20  Subjective   No new complaints. Still trying to get on your schedule for 2 x a week.   Objective                 See [employer] custom training report.                 PROCEDURES AND MODALITIES:  Paraffin:    Moist Heat:    Ice:    E-Stim:    Ultrasound:    Ionto:   Traction:    See Exercise, Manual, and Modality Logs for complete treatment.   Therapy Exercise 17927 8 minutes, NMR 10007 26 minutes and Ther Act 06449 11 minutes       Timed Code Treatment: 45 Minutes  Total Treatment Time: 45 Minutes    Assessment & Plan   Balance exercises were progressed by adding increased resistance to core for stabilization today. All tolerated well. Patient is able to coordinate UE movement with LE balance better at this time.  Pt is progressing well under current treatment plan and will continue to improve with skilled PT and HEP performance.       Progress per Plan of Care    Esperanza Oneil, PT, DPT, CHT, CONSTANCEN  Physical Therapist  [place] license # [id number]

## 2022-11-21 ENCOUNTER — TREATMENT (OUTPATIENT)
Dept: PHYSICAL THERAPY | Facility: CLINIC | Age: 27
End: 2022-11-21

## 2022-11-21 DIAGNOSIS — Z98.890 H/O CRANIOTOMY: ICD-10-CM

## 2022-11-21 DIAGNOSIS — R26.9 ABNORMALITY OF GAIT: Primary | ICD-10-CM

## 2022-11-21 DIAGNOSIS — Q28.3 CAVERNOUS MALFORMATION: ICD-10-CM

## 2022-11-21 PROCEDURE — 97112 NEUROMUSCULAR REEDUCATION: CPT | Performed by: PHYSICAL THERAPIST

## 2022-11-21 PROCEDURE — 97110 THERAPEUTIC EXERCISES: CPT | Performed by: PHYSICAL THERAPIST

## 2022-11-21 PROCEDURE — 97530 THERAPEUTIC ACTIVITIES: CPT | Performed by: PHYSICAL THERAPIST

## 2022-11-21 NOTE — PROGRESS NOTES
Vestibular Daily Progress Note    Visit#:21  Subjective   I am noticing small changes in my balance but I don't test it too much as I know what I am capable of.   Objective                             PROCEDURES AND MODALITIES:  Paraffin:    Moist Heat:    Ice:    E-Stim:    Ultrasound:    Ionto:   Traction:    See Exercise, Manual, and Modality Logs for complete treatment.   Therapy Exercise 76598 8 minutes, NMR 27507 21 minutes and Ther Act 88309 16 minutes       Timed Code Treatment: 45 Minutes  Total Treatment Time: 45 Minutes    Assessment & Plan   Patient is demonstrating minimal improvements in balance. She has shown improvement in her ability to self correct her posture and holding core tight for better balance ability. Pt is progressing well under current treatment plan and will continue to improve with skilled PT and HEP performance.       Progress per Plan of Care    Esperanza Oneil, PT, DPT, CHT, CONSTANCEN  Physical Therapist  [place] license # [id number]

## 2022-11-28 ENCOUNTER — TREATMENT (OUTPATIENT)
Dept: PHYSICAL THERAPY | Facility: CLINIC | Age: 27
End: 2022-11-28

## 2022-11-28 DIAGNOSIS — M25.561 RIGHT KNEE PAIN, UNSPECIFIED CHRONICITY: ICD-10-CM

## 2022-11-28 DIAGNOSIS — Q28.3 CAVERNOUS MALFORMATION: ICD-10-CM

## 2022-11-28 DIAGNOSIS — Z98.890 H/O CRANIOTOMY: ICD-10-CM

## 2022-11-28 DIAGNOSIS — R26.9 ABNORMALITY OF GAIT: Primary | ICD-10-CM

## 2022-11-28 PROCEDURE — 97112 NEUROMUSCULAR REEDUCATION: CPT | Performed by: PHYSICAL THERAPIST

## 2022-11-28 PROCEDURE — 97110 THERAPEUTIC EXERCISES: CPT | Performed by: PHYSICAL THERAPIST

## 2022-11-28 PROCEDURE — 97530 THERAPEUTIC ACTIVITIES: CPT | Performed by: PHYSICAL THERAPIST

## 2022-11-28 NOTE — PROGRESS NOTES
Recertification    Name: Juli Luna  Date: 11/28/2022  Diagnosis/ICD-10 Code:  Abnormality of gait [R26.9]  Referring practitioner: DEANA Amaro  Date of Initial Visit: 4/13/2022  Visit #: 22  Subjective:   Juli Luna reports: I notice improvement in my balance when I am concentrating on what I am doing. I will still lose my balance is I am moving quickly or not paying attention.   Subjective Questionnaire: ABC: 97%  Clinical Progress: improved  Home Program Compliance: Yes  Treatment has included: therapeutic exercise, neuromuscular re-education and therapeutic activity  Objective:   Objective   Computerized Dynamic Posturography  CDP: Sensory Organization Test  Composite Equilibrium Score: 78, WNL  Sensory Analysis WDL: Within Defined Limits  Strategy Analysis: Ankle Dominant       Unilateral Stance Test: She was able to maintain SLS for full time on L 1/3 trials. Failed all trials of SLS on R EO/EC and EC on L    Limits of Stability: Reaction time decreased in right direction. Movement velocity and directional control are normal for all directions. Endpoint and max excursion decreased in forward directions.     DGI: 23/24, not at risk for falls  See [employer] custom training report for today's treatment.  Assessment:   Functional Limitations: Difficulty moving, Walking, Decreased ability to perform ADL's, Decreased ability to perform critical demands of job  Patient continues to have significantly reduced single leg balance, especially on the R LE due to cavernous malformation and h/o craniotomy. She will likely always have reduced coordination and strength in the R LE. She is gaining core strength and is using her core stabilization more with more ambitious balance tasks. Patient warrants additional PT to address remaining balance and coordination deficits.   Plan:   PT Interventions: Therapeutic exercise - strengthening, Balance Training, Home Exercise Program, Retraining of Balance Strategies, Gait  Training  Progress toward previous goals: Partially Met   Short Term Goals (4 wks):  1.  Patient will have increased SLS time of 20 sec. Bilaterally. NOT MET  2.  Patient will be able to perform dynamic standing reciprocal UE/LE movement patterns without LOB. MET   3.  Patient will be able to carry books/folders/bag while navigating stairs without miss steps/LOB. PROGRESSING  4.  Complete [employer] balance assessment.MET     Long Term Goals (12 wks):  1.  Patient will have improved DGI score of 24. PROGRESSING  2.  Patient will be able to perform dynamic SLS activities without LOB. NOT MET   3.  Patient will demonstrate improved core strength/trunk coordination to maintain sitting balance on unstable surface. MET   4.  Patient will be able to use elliptical machine for 5 minutes with good balance and coordination. MET  Recommendations: Continue as planned  Timeframe: 2 months, 2 x a week  Prognosis to achieve goals: good    11/28/2022 Treatments:     Therapy Exercise 66604 12 minutes, NMR 21085 20 minutes and Ther Act 89424 12 minutes    Timed Code Treatment: 44   Minutes     Total Treatment Time: 44      Minutes      PT Signature: Esperanza Oneil, PT, DPT, CHT, JENNIFER   [place] License #: [id number]      Based upon review of the patient's progress and continued therapy plan, it is my medical opinion that Juli Luna should continue physical therapy treatment at [facility] PHYSICAL THERAPY  [address] [place] [id number] [phone].    Signature: __________________________________

## 2022-11-28 NOTE — PROGRESS NOTES
Vestibular Daily Progress Note    Visit#:22  Subjective   ***  Objective                             PROCEDURES AND MODALITIES:  See Exercise, Manual, and Modality Logs for complete treatment.     Paraffin:   pre-rx  Moist Heat:    Ice:    post-rx  E-Stim:    Ultrasound:    Ionto:   Traction:      {AMB PT Rx Minutes:25572}, Self Care/Home Mgmt 72843 minutes 8      Timed Code Treatment: *** Minutes  Total Treatment Time: *** Minutes    Assessment & Plan   ***    Progress per Plan of Care    Esperanza Oneil PT, DPT, CHT, CIDN  Physical Therapist  [place] license # [id number]

## 2022-12-12 ENCOUNTER — TREATMENT (OUTPATIENT)
Dept: PHYSICAL THERAPY | Facility: CLINIC | Age: 27
End: 2022-12-12

## 2022-12-12 DIAGNOSIS — Z98.890 H/O CRANIOTOMY: ICD-10-CM

## 2022-12-12 DIAGNOSIS — Q28.3 CAVERNOUS MALFORMATION: ICD-10-CM

## 2022-12-12 DIAGNOSIS — R26.9 ABNORMALITY OF GAIT: Primary | ICD-10-CM

## 2022-12-12 PROCEDURE — 97112 NEUROMUSCULAR REEDUCATION: CPT | Performed by: PHYSICAL THERAPIST

## 2022-12-12 PROCEDURE — 97530 THERAPEUTIC ACTIVITIES: CPT | Performed by: PHYSICAL THERAPIST

## 2022-12-12 PROCEDURE — 97110 THERAPEUTIC EXERCISES: CPT | Performed by: PHYSICAL THERAPIST

## 2022-12-12 NOTE — PROGRESS NOTES
Physical Therapy Daily Progress Note    Visit #: 23  Juli Luna reports: I am trying to do more balance work in the gym by trying to use the equipment we use here in clinic. I have trouble finding what I need and a place to do it safely.   Pain Scale (0-10):     Subjective       Objective   See Exercise, Manual, and Modality Logs for complete treatment.     PROCEDURES AND MODALITIES:  Paraffin:   pre-rx  Moist Heat:    Ice:   post-rx  E-Stim:    Ultrasound:    Ionto:   Traction:    Dry Needling:         Therapy Exercise 74114 16 minutes, NMR 40382 21 minutes and Ther Act 61945 8 minutes     Timed Code Treatment: 45 Minutes  Total Treatment Time: 45 Minutes    Assessment/Plan  Patient continues to have loss of balance with SL tasks and activity that requires a narrow base of support. Her motor control and coordination issues in the R LE will persist the rest of her life and will likely be more pronounced as she ages. Pt is progressing well under current treatment plan and will continue to improve with skilled PT and HEP performance.       Progress per Plan of Care      Esperanza Oneil, PT, DPT, CHT, CIDN  Physical Therapist  [place] License # [id number]

## 2022-12-19 ENCOUNTER — TREATMENT (OUTPATIENT)
Dept: PHYSICAL THERAPY | Facility: CLINIC | Age: 27
End: 2022-12-19

## 2022-12-19 DIAGNOSIS — Q28.3 CAVERNOUS MALFORMATION: ICD-10-CM

## 2022-12-19 DIAGNOSIS — Z98.890 H/O CRANIOTOMY: ICD-10-CM

## 2022-12-19 DIAGNOSIS — R26.9 ABNORMALITY OF GAIT: Primary | ICD-10-CM

## 2022-12-19 PROCEDURE — 97530 THERAPEUTIC ACTIVITIES: CPT | Performed by: PHYSICAL THERAPIST

## 2022-12-19 PROCEDURE — 97110 THERAPEUTIC EXERCISES: CPT | Performed by: PHYSICAL THERAPIST

## 2022-12-19 PROCEDURE — 97112 NEUROMUSCULAR REEDUCATION: CPT | Performed by: PHYSICAL THERAPIST

## 2022-12-19 NOTE — PROGRESS NOTES
Physical Therapy Daily Progress Note    Visit #: 24  Juli Luna reports: No new compliants.   Pain Scale (0-10):     Subjective       Objective   See Exercise, Manual, and Modality Logs for complete treatment.     PROCEDURES AND MODALITIES:  Paraffin:   pre-rx  Moist Heat:    Ice:   post-rx  E-Stim:    Ultrasound:    Ionto:   Traction:    Dry Needling:         Therapy Exercise 45502 16 minutes, NMR 60452 16 minutes and Ther Act 46458 13 minutes     Timed Code Treatment: 45 Minutes  Total Treatment Time: 45 Minutes    Assessment/Plan  Patient is demonstrating improvement in core strength. She is demonstrating greater independence with tightening her core with more difficult balance tasks in clinic. This has yet to occur in daily activity. Patient would benefit from additional balance rehab to address remaining deficits in strength, motor control of the R LE and balance. Will need additional auth from insurance as her visits will  at the end of the year.     Progress per Plan of Care      Esperanza Oneil PT, DPT, CHT, CONSTANCEN  Physical Therapist  [place] License # [id number]

## 2023-01-06 ENCOUNTER — TREATMENT (OUTPATIENT)
Dept: PHYSICAL THERAPY | Facility: CLINIC | Age: 28
End: 2023-01-06
Payer: COMMERCIAL

## 2023-01-06 DIAGNOSIS — Z98.890 H/O CRANIOTOMY: ICD-10-CM

## 2023-01-06 DIAGNOSIS — R26.9 ABNORMALITY OF GAIT: Primary | ICD-10-CM

## 2023-01-06 DIAGNOSIS — Q28.3 CAVERNOUS MALFORMATION: ICD-10-CM

## 2023-01-06 PROCEDURE — 97530 THERAPEUTIC ACTIVITIES: CPT | Performed by: PHYSICAL THERAPIST

## 2023-01-06 PROCEDURE — 97110 THERAPEUTIC EXERCISES: CPT | Performed by: PHYSICAL THERAPIST

## 2023-01-06 PROCEDURE — 97112 NEUROMUSCULAR REEDUCATION: CPT | Performed by: PHYSICAL THERAPIST

## 2023-01-06 NOTE — PROGRESS NOTES
Vestibular Daily Progress Note    Visit#:25  Subjective   I have not had any more dizziness but I am having pressure and fullness in my ears.  Objective                             PROCEDURES AND MODALITIES:  See Exercise, Manual, and Modality Logs for complete treatment.     Paraffin:   pre-rx  Moist Heat:    Ice:    post-rx  E-Stim:    Ultrasound:    Ionto:   Traction:      {AMB PT Rx Minutes:79535}    Timed Code Treatment: *** Minutes  Total Treatment Time: *** Minutes    Assessment & Plan   ***    Progress per Plan of Care    Esperanza Oneil PT, DPT, CHT, CIDN  Physical Therapist  [place] license # [id number]

## 2023-01-09 NOTE — PROGRESS NOTES
Physical Therapy Daily Progress Note    Visit #: 25  Juli Luna reports: No new compliants  Pain Scale (0-10):     Subjective       Objective   See Exercise, Manual, and Modality Logs for complete treatment.     PROCEDURES AND MODALITIES:  Paraffin:   pre-rx  Moist Heat:    Ice:   post-rx  E-Stim:    Ultrasound:    Ionto:   Traction:    Dry Needling:         Therapy Exercise 04787 14 minutes, NMR 20338 18 minutes and Ther Act 57472 13 minutes     Timed Code Treatment: 45 Minutes  Total Treatment Time: 45 Minutes    Assessment/Plan  Patient is demonstrating minimal improvements in balance. She has shown improvement in her ability to self correct her posture and holding core tight for better balance ability. Pt is progressing well under current treatment plan and will continue to improve with skilled PT and HEP performance.     Progress per Plan of Care      Esperanza Oneil, PT, DPT, CHT, CONSTANCEN  Physical Therapist  [place] License # [id number]

## 2023-01-13 ENCOUNTER — TREATMENT (OUTPATIENT)
Dept: PHYSICAL THERAPY | Facility: CLINIC | Age: 28
End: 2023-01-13
Payer: COMMERCIAL

## 2023-01-13 DIAGNOSIS — Z98.890 H/O CRANIOTOMY: ICD-10-CM

## 2023-01-13 DIAGNOSIS — R26.9 ABNORMALITY OF GAIT: Primary | ICD-10-CM

## 2023-01-13 DIAGNOSIS — Q28.3 CAVERNOUS MALFORMATION: ICD-10-CM

## 2023-01-13 PROCEDURE — 97530 THERAPEUTIC ACTIVITIES: CPT | Performed by: PHYSICAL THERAPIST

## 2023-01-13 PROCEDURE — 97112 NEUROMUSCULAR REEDUCATION: CPT | Performed by: PHYSICAL THERAPIST

## 2023-01-13 PROCEDURE — 97110 THERAPEUTIC EXERCISES: CPT | Performed by: PHYSICAL THERAPIST

## 2023-01-16 ENCOUNTER — TREATMENT (OUTPATIENT)
Dept: PHYSICAL THERAPY | Facility: CLINIC | Age: 28
End: 2023-01-16
Payer: COMMERCIAL

## 2023-01-16 DIAGNOSIS — R26.9 ABNORMALITY OF GAIT: Primary | ICD-10-CM

## 2023-01-16 DIAGNOSIS — Z98.890 H/O CRANIOTOMY: ICD-10-CM

## 2023-01-16 DIAGNOSIS — Q28.3 CAVERNOUS MALFORMATION: ICD-10-CM

## 2023-01-16 PROCEDURE — 97112 NEUROMUSCULAR REEDUCATION: CPT | Performed by: PHYSICAL THERAPIST

## 2023-01-16 PROCEDURE — 97530 THERAPEUTIC ACTIVITIES: CPT | Performed by: PHYSICAL THERAPIST

## 2023-01-16 PROCEDURE — 97110 THERAPEUTIC EXERCISES: CPT | Performed by: PHYSICAL THERAPIST

## 2023-01-16 NOTE — PROGRESS NOTES
Recertification    Name: Juli Luna  Date: 01/13/2023  Diagnosis/ICD-10 Code:  Abnormality of gait [R26.9]  Referring practitioner: Gary Anne MD  Date of Initial Visit: 4/13/2022  Visit #: 26  Subjective:   Juli Luna reports: I am still having issues with imbalance. I still have trouble with walking up stairs when I am holding things in my hand.   Clinical Progress: improved  Home Program Compliance: Yes  Treatment has included: therapeutic exercise, neuromuscular re-education and therapeutic activity  Objective:   Objective     Computerized Dynamic Posturography  CDP: Sensory Organization Test  Composite Equilibrium Score: 78, WNL  Sensory Analysis WDL: Within Defined Limits  Strategy Analysis: Ankle Dominant       Unilateral Stance Test: She was able to maintain SLS for full time on L 1/3 trials. Failed all trials of SLS on R EO/EC and EC on L     Limits of Stability: Reaction time decreased in back, right, and left directions. Movement velocity and directional control are normal for all directions. Endpoint and max excursion decreased in forward and left directions.      DGI: 23/24, not at risk for falls  See [employer] custom training report for today's treatment.  Assessment:   Functional Limitations: Difficulty moving, Walking, Decreased ability to perform ADL's, Decreased ability to perform critical demands of job  Patient has only been able to attend one visit of PT a week for the past several months. She is now able to start PT 2x a week. Patient continues to have significantly reduced single leg balance, especially on the R LE due to cavernous malformation and h/o craniotomy. She will likely always have reduced coordination and strength in the R LE. She is gaining core strength and is using her core stabilization more with more ambitious balance tasks. Patient warrants additional PT to address remaining balance and coordination deficits.   Plan:   PT Interventions: Therapeutic exercise -  strengthening, Balance Training, Home Exercise Program, Retraining of Balance Strategies, Gait Training  Progress toward previous goals: Partially Met   Short Term Goals (4 wks):  1.  Patient will have increased SLS time of 20 sec. Bilaterally. NOT MET  2.  Patient will be able to perform dynamic standing reciprocal UE/LE movement patterns without LOB. MET   3.  Patient will be able to carry books/folders/bag while navigating stairs without miss steps/LOB. PROGRESSING  4.  Complete [employer] balance assessment.MET     Long Term Goals (12 wks):  1.  Patient will have improved DGI score of 24. PROGRESSING  2.  Patient will be able to perform dynamic SLS activities without LOB. NOT MET   3.  Patient will demonstrate improved core strength/trunk coordination to maintain sitting balance on unstable surface. MET   4.  Patient will be able to use elliptical machine for 5 minutes with good balance and coordination. MET    NEW LTG: To be met in 8 weeks:  1. Patient will have increased SLS time of 20 sec. Bilaterally.   Recommendations: Continue as planned  Timeframe: 2 months  Prognosis to achieve goals: good    01/13/2023 Treatments:     Therapy Exercise 70046 15 minutes, NMR 09095 22 minutes and Ther Act 97545 8 minutes    Timed Code Treatment: 45   Minutes     Total Treatment Time: 45      Minutes      PT Signature: Esperanza Oneil PT, DPT, CHT, JENNIFER   [place] License #: [id number]      Based upon review of the patient's progress and continued therapy plan, it is my medical opinion that Juli Luna should continue physical therapy treatment at [facility] PHYSICAL THERAPY  [address] [place] [id number] [phone].    Signature: __________________________________  Gary Anne MD

## 2023-01-16 NOTE — PROGRESS NOTES
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 I am not sure on how I've been doing with stairs lately. Still cannot stand on one leg very long.   Pain Scale (0-10):     Subjective       Objective   See Exercise, Manual, and Modality Logs for complete treatment.     PROCEDURES AND MODALITIES:  Paraffin:   pre-rx  Moist Heat:    Ice:   post-rx  E-Stim:    Ultrasound:    Ionto:   Traction:    Dry Needling:         Therapy Exercise 36276 16 minutes, NMR 98748 18 minutes and Ther Act 85281 10 minutes     Timed Code Treatment: 44 Minutes  Total Treatment Time: 45 Minutes    Assessment/Plan  SLS balance bilaterally is not WNL for her age and patient is demonstrating minimal improvement in ability. Lack of coordination in the R LE persists.     Progress per Plan of Care      Esperanza Oneil, PT, DPT, CHT, CONSTANCEN  Physical Therapist  [place] License # [id number]

## 2023-01-20 ENCOUNTER — TREATMENT (OUTPATIENT)
Dept: PHYSICAL THERAPY | Facility: CLINIC | Age: 28
End: 2023-01-20
Payer: COMMERCIAL

## 2023-01-20 DIAGNOSIS — R26.9 ABNORMALITY OF GAIT: Primary | ICD-10-CM

## 2023-01-20 DIAGNOSIS — Q28.3 CAVERNOUS MALFORMATION: ICD-10-CM

## 2023-01-20 DIAGNOSIS — Z98.890 H/O CRANIOTOMY: ICD-10-CM

## 2023-01-20 PROCEDURE — 97112 NEUROMUSCULAR REEDUCATION: CPT | Performed by: PHYSICAL THERAPIST

## 2023-01-20 PROCEDURE — 97110 THERAPEUTIC EXERCISES: CPT | Performed by: PHYSICAL THERAPIST

## 2023-01-20 PROCEDURE — 97530 THERAPEUTIC ACTIVITIES: CPT | Performed by: PHYSICAL THERAPIST

## 2023-01-20 NOTE — PROGRESS NOTES
Physical Therapy Daily Progress Note    Visit #: 28  Juli Luna reports: Now that we are scheduled for 2 times a week I feel like we will make more progress.   Pain Scale (0-10):     Subjective       Objective   See Exercise, Manual, and Modality Logs for complete treatment.     PROCEDURES AND MODALITIES:  Paraffin:   pre-rx  Moist Heat:    Ice:   post-rx  E-Stim:    Ultrasound:    Ionto:   Traction:    Dry Needling:         Therapy Exercise 65068 16 minutes, NMR 66372 20 minutes and Ther Act 75710 9 minutes     Timed Code Treatment: 45 Minutes  Total Treatment Time: 45 Minutes    Assessment/Plan  Patient is demonstrating improvement in ability to self correct posture with balance tasks. Continues to have difficulty with balance on the R LE and with sharpened stances and tandem ambulation. Patient continues to demonstrate slow improvement ability and use of strategies.      Progress per Plan of Care      Esperanza Oneil PT, DPT, CHT, CONSTANCEN  Physical Therapist  [place] License # [id number]

## 2023-01-23 ENCOUNTER — TREATMENT (OUTPATIENT)
Dept: PHYSICAL THERAPY | Facility: CLINIC | Age: 28
End: 2023-01-23
Payer: COMMERCIAL

## 2023-01-23 DIAGNOSIS — R26.9 ABNORMALITY OF GAIT: Primary | ICD-10-CM

## 2023-01-23 DIAGNOSIS — Q28.3 CAVERNOUS MALFORMATION: ICD-10-CM

## 2023-01-23 DIAGNOSIS — Z98.890 H/O CRANIOTOMY: ICD-10-CM

## 2023-01-23 PROCEDURE — 97112 NEUROMUSCULAR REEDUCATION: CPT | Performed by: PHYSICAL THERAPIST

## 2023-01-23 PROCEDURE — 97530 THERAPEUTIC ACTIVITIES: CPT | Performed by: PHYSICAL THERAPIST

## 2023-01-24 NOTE — PROGRESS NOTES
Physical Therapy Daily Progress Note    Visit #: 29  Juli Luna reports: No new complaints  Pain Scale (0-10):     Subjective       Objective   See Exercise, Manual, and Modality Logs for complete treatment.     PROCEDURES AND MODALITIES:  Paraffin:   pre-rx  Moist Heat:    Ice:   post-rx  E-Stim:    Ultrasound:    Ionto:   Traction:    Dry Needling:         Therapy Exercise 66029 21 minutes and NMR 91245 10 minutes     Timed Code Treatment: 31 Minutes  Total Treatment Time: 31 Minutes    Assessment/Plan  Patient was tardy to session today. Patient is benefitting from core strengthening to improved distal stability and mobility.    Progress per Plan of Care      Esperanza Oneil, PT, DPT, CHT, CIDN  Physical Therapist  [place] License # [id number]

## 2023-01-27 ENCOUNTER — TREATMENT (OUTPATIENT)
Dept: PHYSICAL THERAPY | Facility: CLINIC | Age: 28
End: 2023-01-27
Payer: COMMERCIAL

## 2023-01-27 DIAGNOSIS — Z98.890 H/O CRANIOTOMY: ICD-10-CM

## 2023-01-27 DIAGNOSIS — Q28.3 CAVERNOUS MALFORMATION: ICD-10-CM

## 2023-01-27 DIAGNOSIS — R26.9 ABNORMALITY OF GAIT: Primary | ICD-10-CM

## 2023-01-27 PROCEDURE — 97110 THERAPEUTIC EXERCISES: CPT | Performed by: PHYSICAL THERAPIST

## 2023-01-27 PROCEDURE — 97530 THERAPEUTIC ACTIVITIES: CPT | Performed by: PHYSICAL THERAPIST

## 2023-01-27 PROCEDURE — 97112 NEUROMUSCULAR REEDUCATION: CPT | Performed by: PHYSICAL THERAPIST

## 2023-01-27 NOTE — PROGRESS NOTES
Physical Therapy Daily Treatment Note  [facility] [place]  [address]  [facility] [address]      Visit #: 30  Juli Jeremy reports: No new complinats.  Pain Scale (0-10):     Subjective       Objective   See Exercise, Manual, and Modality Logs for complete treatment.     PROCEDURES AND MODALITIES:  Paraffin:   pre-rx  Moist Heat:    Ice:   post-rx  E-Stim:    Ultrasound:    Ionto:   Traction:    Dry Needling:         Therapy Exercise 94347 18 minutes, NMR 55197 18 minutes and Ther Act 06114 14 minutes     Timed Code Treatment: 50 Minutes  Total Treatment Time: 50 Minutes    Assessment/Plan  Patient continues to have loss of balance with SL tasks and activity that requires a narrow base of support. Her motor control and coordination issues in the R LE will persist the rest of her life and will likely be more pronounced as she ages. Pt is progressing well under current treatment plan and will continue to improve with skilled PT and HEP performance.     Progress per Plan of Care      Esperanza Oneil, PT, DPT, CHT, CONSTNACEN  Physical Therapist  [place] License # [id number]

## 2023-01-30 ENCOUNTER — TREATMENT (OUTPATIENT)
Dept: PHYSICAL THERAPY | Facility: CLINIC | Age: 28
End: 2023-01-30
Payer: COMMERCIAL

## 2023-01-30 DIAGNOSIS — R26.9 ABNORMALITY OF GAIT: Primary | ICD-10-CM

## 2023-01-30 DIAGNOSIS — Q28.3 CAVERNOUS MALFORMATION: ICD-10-CM

## 2023-01-30 DIAGNOSIS — Z98.890 H/O CRANIOTOMY: ICD-10-CM

## 2023-01-30 PROCEDURE — 97112 NEUROMUSCULAR REEDUCATION: CPT | Performed by: PHYSICAL THERAPIST

## 2023-01-30 PROCEDURE — 97530 THERAPEUTIC ACTIVITIES: CPT | Performed by: PHYSICAL THERAPIST

## 2023-01-30 PROCEDURE — 97110 THERAPEUTIC EXERCISES: CPT | Performed by: PHYSICAL THERAPIST

## 2023-01-30 NOTE — PROGRESS NOTES
Physical Therapy Daily Treatment Note  [facility] [place]  [address]  [facility] [address]      Visit #: 31  Juli Luna reports: I did have to go up and down a lot of stairs at work today. I did ok.   Pain Scale (0-10):     Subjective       Objective   See Exercise, Manual, and Modality Logs for complete treatment.     PROCEDURES AND MODALITIES:  Paraffin:   pre-rx  Moist Heat:    Ice:   post-rx  E-Stim:    Ultrasound:    Ionto:   Traction:    Dry Needling:         Therapy Exercise 44892 16 minutes, NMR 66667 18 minutes and Ther Act 05805 12 minutes     Timed Code Treatment: 46Minutes  Total Treatment Time: 46 Minutes    Assessment/Plan  Patient is reporting improved ability to ascend and descend stairs. This demonstrates improvement in her SL balance.   She continues to have poor R SL balance.   Progress per Plan of Care      Esperanza Oneil, PT, DPT, CHT, JENNIFER  Physical Therapist  [place] License # [id number]

## 2023-02-03 ENCOUNTER — TREATMENT (OUTPATIENT)
Dept: PHYSICAL THERAPY | Facility: CLINIC | Age: 28
End: 2023-02-03
Payer: COMMERCIAL

## 2023-02-03 DIAGNOSIS — Z98.890 H/O CRANIOTOMY: ICD-10-CM

## 2023-02-03 DIAGNOSIS — Q28.3 CAVERNOUS MALFORMATION: ICD-10-CM

## 2023-02-03 DIAGNOSIS — R26.9 ABNORMALITY OF GAIT: Primary | ICD-10-CM

## 2023-02-03 PROCEDURE — 97530 THERAPEUTIC ACTIVITIES: CPT | Performed by: PHYSICAL THERAPIST

## 2023-02-03 PROCEDURE — 97112 NEUROMUSCULAR REEDUCATION: CPT | Performed by: PHYSICAL THERAPIST

## 2023-02-03 PROCEDURE — 97110 THERAPEUTIC EXERCISES: CPT | Performed by: PHYSICAL THERAPIST

## 2023-02-03 NOTE — PROGRESS NOTES
Physical Therapy Daily Treatment Note  [facility] Therapy [place]  [address]  [facility] [address]      Visit #: 32  Juli Luna reports: No new compliants.  Pain Scale (0-10):     Subjective       Objective   See Exercise, Manual, and Modality Logs for complete treatment.     PROCEDURES AND MODALITIES:  Paraffin:   pre-rx  Moist Heat:    Ice:   post-rx  E-Stim:    Ultrasound:    Ionto:   Traction:    Dry Needling:         Therapy Exercise 53267 19 minutes, NMR 99784 14 minutes and Ther Act 96404 14 minutes     Timed Code Treatment: 47 Minutes  Total Treatment Time: 47 Minutes    Assessment/Plan  Patient is demonstrating improved SL balance but is still poor on the R. Was able to tolerate progression of BREE for core strength.     Progress per Plan of Care      Esperanza Oneil PT, DPT, CHT, CONSTANCEN  Physical Therapist  [place] License # [id number]

## 2023-02-06 ENCOUNTER — TREATMENT (OUTPATIENT)
Dept: PHYSICAL THERAPY | Facility: CLINIC | Age: 28
End: 2023-02-06
Payer: COMMERCIAL

## 2023-02-06 DIAGNOSIS — Z98.890 H/O CRANIOTOMY: ICD-10-CM

## 2023-02-06 DIAGNOSIS — R26.9 ABNORMALITY OF GAIT: Primary | ICD-10-CM

## 2023-02-06 DIAGNOSIS — Q28.3 CAVERNOUS MALFORMATION: ICD-10-CM

## 2023-02-06 PROCEDURE — 97112 NEUROMUSCULAR REEDUCATION: CPT | Performed by: PHYSICAL THERAPIST

## 2023-02-06 PROCEDURE — 97530 THERAPEUTIC ACTIVITIES: CPT | Performed by: PHYSICAL THERAPIST

## 2023-02-06 PROCEDURE — 97110 THERAPEUTIC EXERCISES: CPT | Performed by: PHYSICAL THERAPIST

## 2023-02-06 NOTE — PROGRESS NOTES
Physical Therapy Daily Treatment Note  [facility] Physical Therapy [place]  [address]  [facility] [address]      Visit #: 33  Juli Luna reports: My knee has been bothering me some so I don't want to do anything kneeling.   Pain Scale (0-10):     Subjective       Objective   See Exercise, Manual, and Modality Logs for complete treatment.     PROCEDURES AND MODALITIES:  Paraffin:   pre-rx  Moist Heat:    Ice:   post-rx  E-Stim:    Ultrasound:    Ionto:   Traction:    Dry Needling:         Therapy Exercise 19494 19 minutes, NMR 61528 15 minutes and Ther Act 35050 10 minutes     Timed Code Treatment: 44 Minutes  Total Treatment Time: 44 Minutes    Assessment/Plan  Patient demonstrated improvement in tandem ambulation on foam beam. Avoided kneeling exercise today for R knee pain. Pt is progressing well under current treatment plan and will continue to improve with skilled PT and HEP performance.       Progress per Plan of Care      Esperanza Oneil, PT, DPT, CHT, CIDN  Physical Therapist  [place] License # [id number]

## 2023-02-10 ENCOUNTER — TREATMENT (OUTPATIENT)
Dept: PHYSICAL THERAPY | Facility: CLINIC | Age: 28
End: 2023-02-10
Payer: COMMERCIAL

## 2023-02-10 DIAGNOSIS — Z98.890 H/O CRANIOTOMY: ICD-10-CM

## 2023-02-10 DIAGNOSIS — Q28.3 CAVERNOUS MALFORMATION: Primary | ICD-10-CM

## 2023-02-10 DIAGNOSIS — R26.9 ABNORMALITY OF GAIT: ICD-10-CM

## 2023-02-10 PROCEDURE — 97530 THERAPEUTIC ACTIVITIES: CPT | Performed by: PHYSICAL THERAPIST

## 2023-02-10 PROCEDURE — 97110 THERAPEUTIC EXERCISES: CPT | Performed by: PHYSICAL THERAPIST

## 2023-02-10 PROCEDURE — 97112 NEUROMUSCULAR REEDUCATION: CPT | Performed by: PHYSICAL THERAPIST

## 2023-02-10 NOTE — PROGRESS NOTES
Physical Therapy Daily Treatment Note  [facility] Physical Therapy [place]  [address]  [facility] [address]      Visit #: 34  Juli Luna reports: No new pain in the past week.  Pain Scale (0-10):     Subjective       Objective   See Exercise, Manual, and Modality Logs for complete treatment.     PROCEDURES AND MODALITIES:  Paraffin:   pre-rx  Moist Heat:    Ice:   post-rx  E-Stim:    Ultrasound:    Ionto:   Traction:    Dry Needling:         Therapy Exercise 82543 20 minutes, NMR 59513 18 minutes and Ther Act 25980 10 minutes     Timed Code Treatment: 48 Minutes  Total Treatment Time: 50 Minutes    Assessment/Plan  Patient is demonstrating minimal improvements in balance. She has shown improvement in her ability to self correct her posture and holding core tight for better balance ability. Pt is progressing well under current treatment plan and will continue to improve with skilled PT and HEP performance.     Progress per Plan of Care, RECERT NEXT VISIT      Esperanza Oneil, PT, DPT, CHT, CIDN  Physical Therapist  [place] License # [id number]

## 2023-02-17 ENCOUNTER — TREATMENT (OUTPATIENT)
Dept: PHYSICAL THERAPY | Facility: CLINIC | Age: 28
End: 2023-02-17
Payer: COMMERCIAL

## 2023-02-17 DIAGNOSIS — Z98.890 H/O CRANIOTOMY: ICD-10-CM

## 2023-02-17 DIAGNOSIS — Q28.3 CAVERNOUS MALFORMATION: Primary | ICD-10-CM

## 2023-02-17 DIAGNOSIS — R26.9 ABNORMALITY OF GAIT: ICD-10-CM

## 2023-02-17 PROCEDURE — 97530 THERAPEUTIC ACTIVITIES: CPT | Performed by: PHYSICAL THERAPIST

## 2023-02-17 PROCEDURE — 97112 NEUROMUSCULAR REEDUCATION: CPT | Performed by: PHYSICAL THERAPIST

## 2023-02-17 PROCEDURE — 97110 THERAPEUTIC EXERCISES: CPT | Performed by: PHYSICAL THERAPIST

## 2023-02-17 NOTE — PROGRESS NOTES
Recertification/Monthly Progress Note    Name: Juli Luna  Date: 02/17/2023  Diagnosis/ICD-10 Code:  Cavernous malformation [Q28.3]  Referring practitioner: Gary Anne MD  Date of Initial Visit: 4/13/2022  Visit #: 35  Subjective:   Juli Luna reports: I have not noticed many changes in my balance. Stairs are easier but I am not able to stand on either leg for very long.   Subjective Questionnaire: ABC: 98%,  unchanged  Clinical Progress: unchanged  Home Program Compliance: Yes  Treatment has included: therapeutic exercise, neuromuscular re-education and therapeutic activity  Objective:   Objective                       Computerized Dynamic Posturography  CDP: Sensory Organization Test  Composite Equilibrium Score: 78, WNL  Sensory Analysis WDL: Within Defined Limits  Strategy Analysis: Ankle Dominant       Unilateral Stance Test: She was able to maintain SLS for full time on L 2/3 trials. Failed all trials of SLS on R EO/EC and EC on L     Limits of Stability: Reaction time decreased in right direction. Movement velocity and directional control are normal for all directions. Endpoint and max excursion decreased in forward and right directions.      DGI: 23/24, not at risk for falls  See NeuroCom custom training report for today's treatment.  Assessment:   Functional Limitations: Difficulty moving, Walking, Decreased ability to perform ADL's, Decreased ability to perform critical demands of job  Patient demonstrated minimal improvement in SL balance and associated tasks. Patient continues to have functional limitations from imbalance including the inability to ride a bike. Will see for one additional month then DC to [facility].  Plan:   PT Interventions: Therapeutic exercise - strengthening, Balance Training, Home Exercise Program, Retraining of Balance Strategies, Gait Training  Progress toward previous goals: Partially Met   Short Term Goals(ALL MET)  1.  Patient will have increased SLS time of 20 sec.  Bilaterally. NOT MET  2.  Patient will be able to perform dynamic standing reciprocal UE/LE movement patterns without LOB. MET   3.  Patient will be able to carry books/folders/bag while navigating stairs without miss steps/LOB. MET   4.  Complete NeuroCom balance assessment.MET     Long Term Goals (4 wks):  1.  Patient will have improved DGI score of 24. PROGRESSING  2.  Patient will be able to perform dynamic SLS activities without LOB. NOT MET   3.  Patient will demonstrate improved core strength/trunk coordination to maintain sitting balance on unstable surface. MET   4.  Patient will be able to use elliptical machine for 5 minutes with good balance and coordination. MET  5. Patient will have increased SLS time of 20 sec. Bilaterally.   Recommendations: Continue as planned  Timeframe: 1 month  Prognosis to achieve goals: good    02/17/2023 Treatments:     Therapy Exercise 57151 21 minutes, NMR 18762 23 minutes and Ther Act 02703 10 minutes    Timed Code Treatment: 54   Minutes     Total Treatment Time: 54      Minutes      Electronically signed by Esperanza Oneil PT, 02/17/23, 4:24 PM EST    Certification Period: 2/17/2023 thru 5/17/2023  I certify that the therapy services are furnished while this patient is under my care.  The services outlined above are required by this patient, and will be reviewed every 90 days.         Physician Signature:__________________________________________________    PHYSICIAN: Gary Anne MD  NPI: [id number]                                      DATE:    Please sign and return via fax to [phone] at [facility] . Thank you, [facility]

## 2023-02-20 ENCOUNTER — TREATMENT (OUTPATIENT)
Dept: PHYSICAL THERAPY | Facility: CLINIC | Age: 28
End: 2023-02-20
Payer: COMMERCIAL

## 2023-02-20 DIAGNOSIS — Q28.3 CAVERNOUS MALFORMATION: Primary | ICD-10-CM

## 2023-02-20 DIAGNOSIS — R26.9 ABNORMALITY OF GAIT: ICD-10-CM

## 2023-02-20 DIAGNOSIS — Z98.890 H/O CRANIOTOMY: ICD-10-CM

## 2023-02-20 PROCEDURE — 97112 NEUROMUSCULAR REEDUCATION: CPT | Performed by: PHYSICAL THERAPIST

## 2023-02-20 PROCEDURE — 97530 THERAPEUTIC ACTIVITIES: CPT | Performed by: PHYSICAL THERAPIST

## 2023-02-20 PROCEDURE — 97110 THERAPEUTIC EXERCISES: CPT | Performed by: PHYSICAL THERAPIST

## 2023-02-20 NOTE — PROGRESS NOTES
Physical Therapy Daily Treatment Note  [facility] Physical Therapy [place]  [address]  [facility] [address]      Visit #: 36  Juli Luna reports: No new compliants. My knee has not been bothering me lately.   Pain Scale (0-10):     Subjective       Objective   See Exercise, Manual, and Modality Logs for complete treatment.     PROCEDURES AND MODALITIES:  Paraffin:   pre-rx  Moist Heat:    Ice:   post-rx  E-Stim:    Ultrasound:    Ionto:   Traction:    Dry Needling:         Therapy Exercise 08052 20 minutes, NMR 47268 18 minutes and Ther Act 29306 8 minutes     Timed Code Treatment: 46 Minutes  Total Treatment Time: 46 Minutes    Assessment/Plan  Patient was able to do kneeling balance and core work today without knee pain. Able to progress from green foam oval to blue foam oval for SL balance tasks.     Progress per Plan of Care      Esperanza Oneil PT, DPT, CHT, JENNIFER  Physical Therapist  [place] License # [id number]

## 2023-02-24 ENCOUNTER — TREATMENT (OUTPATIENT)
Dept: PHYSICAL THERAPY | Facility: CLINIC | Age: 28
End: 2023-02-24
Payer: COMMERCIAL

## 2023-02-24 DIAGNOSIS — Z98.890 H/O CRANIOTOMY: ICD-10-CM

## 2023-02-24 DIAGNOSIS — R26.9 ABNORMALITY OF GAIT: ICD-10-CM

## 2023-02-24 DIAGNOSIS — Q28.3 CAVERNOUS MALFORMATION: Primary | ICD-10-CM

## 2023-02-24 PROCEDURE — 97530 THERAPEUTIC ACTIVITIES: CPT | Performed by: PHYSICAL THERAPIST

## 2023-02-24 PROCEDURE — 97110 THERAPEUTIC EXERCISES: CPT | Performed by: PHYSICAL THERAPIST

## 2023-02-24 PROCEDURE — 97112 NEUROMUSCULAR REEDUCATION: CPT | Performed by: PHYSICAL THERAPIST

## 2023-02-24 NOTE — PROGRESS NOTES
Physical Therapy Daily Treatment Note  [facility] Physical Therapy [place]  [address]  [facility] [address]      Visit #: 37  Juli Luna reports: No new compliants.  Pain Scale (0-10):     Subjective       Objective   See Exercise, Manual, and Modality Logs for complete treatment.     PROCEDURES AND MODALITIES:  Paraffin:   pre-rx  Moist Heat:    Ice:   post-rx  E-Stim:    Ultrasound:    Ionto:   Traction:    Dry Needling:         Therapy Exercise 26925 17 minutes, NMR 45991 25 minutes and Ther Act 35521 12 minutes     Timed Code Treatment: 54 Minutes  Total Treatment Time: 54 Minutes    Assessment/Plan  Patient continues to have loss of balance with SL tasks and activity that requires a narrow base of support. Her motor control and coordination issues in the R LE will persist the rest of her life and will likely be more pronounced as she ages. Pt is progressing well under current treatment plan and will continue to improve with skilled PT and HEP performance.     Progress per Plan of Care      Esperanza Oneil, PT, DPT, CHT, CONSTANCEN  Physical Therapist  [place] License # [id number]

## 2023-02-27 ENCOUNTER — TELEPHONE (OUTPATIENT)
Dept: PHYSICAL THERAPY | Facility: OTHER | Age: 28
End: 2023-02-27

## 2023-03-03 ENCOUNTER — TREATMENT (OUTPATIENT)
Dept: PHYSICAL THERAPY | Facility: CLINIC | Age: 28
End: 2023-03-03
Payer: COMMERCIAL

## 2023-03-03 DIAGNOSIS — Z98.890 H/O CRANIOTOMY: ICD-10-CM

## 2023-03-03 DIAGNOSIS — R26.9 ABNORMALITY OF GAIT: ICD-10-CM

## 2023-03-03 DIAGNOSIS — Q28.3 CAVERNOUS MALFORMATION: Primary | ICD-10-CM

## 2023-03-03 PROCEDURE — 97112 NEUROMUSCULAR REEDUCATION: CPT | Performed by: PHYSICAL THERAPIST

## 2023-03-03 PROCEDURE — 97530 THERAPEUTIC ACTIVITIES: CPT | Performed by: PHYSICAL THERAPIST

## 2023-03-03 PROCEDURE — 97110 THERAPEUTIC EXERCISES: CPT | Performed by: PHYSICAL THERAPIST

## 2023-03-03 NOTE — PROGRESS NOTES
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   Pain Scale (0-10):     Subjective       Objective   See Exercise, Manual, and Modality Logs for complete treatment.     PROCEDURES AND MODALITIES:  Paraffin:   pre-rx  Moist Heat:    Ice:   post-rx  E-Stim:    Ultrasound:    Ionto:   Traction:    Dry Needling:         Therapy Exercise 73436 17 minutes, NMR 18220 25 minutes and Ther Act 48419 12 minutes     Timed Code Treatment: 54 Minutes  Total Treatment Time: 54 Minutes    Assessment/Plan  Patient continues to have loss of balance with SL tasks and activity that requires a narrow base of support. Her motor control and coordination issues in the R LE will persist the rest of her life and will likely be more pronounced as she ages. Pt is progressing well under current treatment plan and will continue to improve with skilled PT and HEP performance.     Progress per Plan of Care      Esperanza Oneil, PT, DPT, CHT, CIDN  Physical Therapist  [place] License # [id number]

## 2023-03-06 ENCOUNTER — TREATMENT (OUTPATIENT)
Dept: PHYSICAL THERAPY | Facility: CLINIC | Age: 28
End: 2023-03-06
Payer: COMMERCIAL

## 2023-03-06 DIAGNOSIS — R26.9 ABNORMALITY OF GAIT: ICD-10-CM

## 2023-03-06 DIAGNOSIS — Q28.3 CAVERNOUS MALFORMATION: Primary | ICD-10-CM

## 2023-03-06 DIAGNOSIS — Z98.890 H/O CRANIOTOMY: ICD-10-CM

## 2023-03-06 PROCEDURE — 97530 THERAPEUTIC ACTIVITIES: CPT | Performed by: PHYSICAL THERAPIST

## 2023-03-06 PROCEDURE — 97112 NEUROMUSCULAR REEDUCATION: CPT | Performed by: PHYSICAL THERAPIST

## 2023-03-06 PROCEDURE — 97110 THERAPEUTIC EXERCISES: CPT | Performed by: PHYSICAL THERAPIST

## 2023-03-06 NOTE — PROGRESS NOTES
Physical Therapy Daily Treatment Note  [facility] Physical Therapy [place]  [address]  [facility] [address]      Visit #: 39  Juli Luna reports: I have not worked on my planks I have been using the [employer] at the gym.  Pain Scale (0-10):     Subjective       Objective   See Exercise, Manual, and Modality Logs for complete treatment.     PROCEDURES AND MODALITIES:  Paraffin:   pre-rx  Moist Heat:    Ice:   post-rx  E-Stim:    Ultrasound:    Ionto:   Traction:    Dry Needling:         Therapy Exercise 05762 20 minutes, NMR 57925 25 minutes and Ther Act 42299 10 minutes     Timed Code Treatment: 55  Minutes  Total Treatment Time: 55 Minutes    Assessment/Plan  Added SL step ups to HEP for balance as long as this does not exacerbate her knee pain. This should help with her balance ascending and descending stairs when not able to use railing. Pt is progressing well under current treatment plan and will continue to improve with skilled PT and HEP performance.       Progress per Plan of Care      Esperanza Oneil, PT, DPT, CHT, CIDN  Physical Therapist  [place] License # [id number]

## 2023-03-13 ENCOUNTER — TREATMENT (OUTPATIENT)
Dept: PHYSICAL THERAPY | Facility: CLINIC | Age: 28
End: 2023-03-13
Payer: COMMERCIAL

## 2023-03-13 DIAGNOSIS — R26.9 ABNORMALITY OF GAIT: ICD-10-CM

## 2023-03-13 DIAGNOSIS — Q28.3 CAVERNOUS MALFORMATION: Primary | ICD-10-CM

## 2023-03-13 DIAGNOSIS — Z98.890 H/O CRANIOTOMY: ICD-10-CM

## 2023-03-13 PROCEDURE — 97112 NEUROMUSCULAR REEDUCATION: CPT | Performed by: PHYSICAL THERAPIST

## 2023-03-13 PROCEDURE — 97530 THERAPEUTIC ACTIVITIES: CPT | Performed by: PHYSICAL THERAPIST

## 2023-03-13 PROCEDURE — 97110 THERAPEUTIC EXERCISES: CPT | Performed by: PHYSICAL THERAPIST

## 2023-03-13 NOTE — PROGRESS NOTES
Physical Therapy Daily Treatment Note  [facility] Physical Therapy [place]  [address]  [facility] [address]      Visit #: 40  Juli Luna reports: No new compliants  Pain Scale (0-10):     Subjective       Objective   See Exercise, Manual, and Modality Logs for complete treatment.     PROCEDURES AND MODALITIES:  Paraffin:   pre-rx  Moist Heat:    Ice:   post-rx  E-Stim:    Ultrasound:    Ionto:   Traction:    Dry Needling:         Therapy Exercise 80932 20 minutes, NMR 83440 18 minutes and Ther Act 76018 10 minutes     Timed Code Treatment: 48 Minutes  Total Treatment Time: 48 Minutes    Assessment/Plan  Pt is progressing well under current treatment plan and will continue to improve with skilled PT and HEP performance.   Improved performance on stepping over hurdles today. Less LOB.     Progress per Plan of Care      Esperanza Oneil, PT, DPT, CHT, CIDN  Physical Therapist  [place] License # [id number]

## 2023-03-20 ENCOUNTER — TREATMENT (OUTPATIENT)
Dept: PHYSICAL THERAPY | Facility: CLINIC | Age: 28
End: 2023-03-20
Payer: COMMERCIAL

## 2023-03-20 DIAGNOSIS — R26.9 ABNORMALITY OF GAIT: ICD-10-CM

## 2023-03-20 DIAGNOSIS — Q28.3 CAVERNOUS MALFORMATION: Primary | ICD-10-CM

## 2023-03-20 DIAGNOSIS — Z98.890 H/O CRANIOTOMY: ICD-10-CM

## 2023-03-20 PROCEDURE — 97530 THERAPEUTIC ACTIVITIES: CPT | Performed by: PHYSICAL THERAPIST

## 2023-03-20 PROCEDURE — 97110 THERAPEUTIC EXERCISES: CPT | Performed by: PHYSICAL THERAPIST

## 2023-03-20 PROCEDURE — 97112 NEUROMUSCULAR REEDUCATION: CPT | Performed by: PHYSICAL THERAPIST

## 2023-03-21 NOTE — PROGRESS NOTES
Physical Therapy Daily Treatment Note  [facility] Physical Therapy [place]  [address]  [facility] [address]      Visit #: 41  Juli Luna reports: No new complaints. I know I need to work on my core to ride a bike.   Pain Scale (0-10):     Subjective       Objective   See Exercise, Manual, and Modality Logs for complete treatment.     PROCEDURES AND MODALITIES:  Paraffin:   pre-rx  Moist Heat:    Ice:   post-rx  E-Stim:    Ultrasound:    Ionto:   Traction:    Dry Needling:         Therapy Exercise 09993 16 minutes, NMR 91669 20 minutes and Ther Act 37787 9 minutes     Timed Code Treatment: 45 Minutes  Total Treatment Time: 45 Minutes    Assessment/Plan  Patient is demonstrating improvement in ability to self correct posture with balance tasks. Continues to have difficulty with balance on the R LE and with sharpened stances and tandem ambulation. Patient continues to demonstrate slow improvement ability and use of strategies.      Anticipate DC next Visit as her insurance is no longer accepted by this clinic      Esperanza Oneil, PT, DPT, CHT, CONSTANCEN  Physical Therapist  [place] License # [id number]

## 2023-04-03 ENCOUNTER — TREATMENT (OUTPATIENT)
Dept: PHYSICAL THERAPY | Facility: CLINIC | Age: 28
End: 2023-04-03
Payer: COMMERCIAL

## 2023-04-03 DIAGNOSIS — Q28.3 CAVERNOUS MALFORMATION: Primary | ICD-10-CM

## 2023-04-03 DIAGNOSIS — R26.9 ABNORMALITY OF GAIT: ICD-10-CM

## 2023-04-03 DIAGNOSIS — Z98.890 H/O CRANIOTOMY: ICD-10-CM

## 2023-04-03 PROCEDURE — 97110 THERAPEUTIC EXERCISES: CPT | Performed by: PHYSICAL THERAPIST

## 2023-04-03 PROCEDURE — 97112 NEUROMUSCULAR REEDUCATION: CPT | Performed by: PHYSICAL THERAPIST

## 2023-04-03 PROCEDURE — 97530 THERAPEUTIC ACTIVITIES: CPT | Performed by: PHYSICAL THERAPIST

## 2023-04-03 NOTE — PROGRESS NOTES
Physical Therapy Daily Progress Note-Vestibular Rehab  [facility] Physical Therapy [place]  [address]  [facility] [address]      Visit#:42  Subjective   I have reached out to the therapist you gave me the name of. I have not heard back from her yet.   Objective                             PROCEDURES AND MODALITIES:  See Exercise, Manual, and Modality Logs for complete treatment.     Paraffin:   pre-rx  Moist Heat:    Ice:    post-rx  E-Stim:    Ultrasound:    Ionto:   Traction:      Therapy Exercise 23271 20 minutes, NMR 47062 13 minutes and Ther Act 54460 14 minutes    Timed Code Treatment: 47 Minutes  Total Treatment Time: 47 Minutes    Assessment & Plan   Patient is transitioning out of this clinic for PT as we no longer take her insurance. I have given her another PTs information so that she can continue PT elsewhere. Patient has made improvement in her balance but she had reached a plateau in improvement, in the short term. Patient is discharged at this time with goals partially met.     Progress per Plan of Care    Esperanza Oneil, PT, DPT, CHT, CONSTANCEN  Physical Therapist  [place] license # [id number]

## 2023-08-01 ENCOUNTER — TELEPHONE (OUTPATIENT)
Dept: NEUROSURGERY | Facility: CLINIC | Age: 28
End: 2023-08-01
Payer: COMMERCIAL

## 2023-08-08 DIAGNOSIS — R26.9 ABNORMALITY OF GAIT: Primary | ICD-10-CM

## 2024-01-05 ENCOUNTER — TELEPHONE (OUTPATIENT)
Dept: NEUROSURGERY | Facility: CLINIC | Age: 29
End: 2024-01-05
Payer: COMMERCIAL

## 2024-01-05 NOTE — TELEPHONE ENCOUNTER
Patient called with clinical questions asking for callback. She is scheduled to get an MRI of her shoulder and needs to know the make, model, and material of implants in her brain that were done by Dr. Anne in 2016. She is additionally wanting to know if she needs a follow up scheduled with him as an annual visit. Please advise. Patient # [phone].

## 2024-01-05 NOTE — TELEPHONE ENCOUNTER
Dr. Regalado did implant in 2016. Implant information under implants tab in Epic. Per last office note with Dr. Anne she should return around 3/16/2025.

## 2024-01-15 ENCOUNTER — OFFICE VISIT (OUTPATIENT)
Dept: GASTROENTEROLOGY | Facility: CLINIC | Age: 29
End: 2024-01-15
Payer: COMMERCIAL

## 2024-01-15 ENCOUNTER — TELEPHONE (OUTPATIENT)
Dept: GASTROENTEROLOGY | Facility: CLINIC | Age: 29
End: 2024-01-15
Payer: COMMERCIAL

## 2024-01-15 VITALS
DIASTOLIC BLOOD PRESSURE: 85 MMHG | TEMPERATURE: 97.1 F | WEIGHT: 193.4 LBS | BODY MASS INDEX: 36.51 KG/M2 | HEIGHT: 61 IN | HEART RATE: 108 BPM | SYSTOLIC BLOOD PRESSURE: 133 MMHG

## 2024-01-15 DIAGNOSIS — K21.9 GASTROESOPHAGEAL REFLUX DISEASE, UNSPECIFIED WHETHER ESOPHAGITIS PRESENT: ICD-10-CM

## 2024-01-15 DIAGNOSIS — R10.13 DYSPEPSIA: Primary | ICD-10-CM

## 2024-01-15 PROCEDURE — 1160F RVW MEDS BY RX/DR IN RCRD: CPT | Performed by: PHYSICIAN ASSISTANT

## 2024-01-15 PROCEDURE — 1159F MED LIST DOCD IN RCRD: CPT | Performed by: PHYSICIAN ASSISTANT

## 2024-01-15 PROCEDURE — 99204 OFFICE O/P NEW MOD 45 MIN: CPT | Performed by: PHYSICIAN ASSISTANT

## 2024-01-15 RX ORDER — SPIRONOLACTONE 100 MG/1
100 TABLET, FILM COATED ORAL
COMMUNITY

## 2024-01-15 RX ORDER — AMANTADINE HYDROCHLORIDE 100 MG/1
TABLET ORAL
COMMUNITY
Start: 2024-01-10

## 2024-01-15 RX ORDER — DIPHENOXYLATE HYDROCHLORIDE AND ATROPINE SULFATE 2.5; .025 MG/1; MG/1
TABLET ORAL
COMMUNITY

## 2024-01-15 RX ORDER — CLINDAMYCIN PHOSPHATE 10 MG/ML
SOLUTION TOPICAL
COMMUNITY
Start: 2024-01-10

## 2024-01-15 RX ORDER — FAMOTIDINE 40 MG/1
40 TABLET, FILM COATED ORAL DAILY
Qty: 30 TABLET | Refills: 5 | Status: SHIPPED | OUTPATIENT
Start: 2024-01-15

## 2024-01-15 RX ORDER — NORETHINDRONE ACETATE/ETHINYL ESTRADIOL AND FERROUS FUMARATE 1.5-30(21)
KIT ORAL
COMMUNITY
Start: 2023-12-12

## 2024-01-15 RX ORDER — TRETINOIN 0.025 %
CREAM (GRAM) TOPICAL
COMMUNITY
Start: 2024-01-10

## 2024-01-15 NOTE — H&P (VIEW-ONLY)
"Chief Complaint  Nausea, Vomiting, Heartburn, and Diarrhea    Subjective          History of Present Illness    Juli Luna is a  28 y.o. female presents for evaluation of nausea, vomiting, and heartburn.  She is a former patient Dr. Andrade last seen in 2017.  She is new to me.    She was treated 2017 for GERD with pantoprazole 40 mg daily. She reports strong gag reflex if she brushes her teeth after eating, water brash with bending over, heartburn. She started omeprazole 20mg in Nov --took for 10 days which did help. Advised to take it now as needed. She does diclofenac previously took daily but now taking less due to concerns for worsening heartburn. Heartburn some better. Avoids dried and spicy foods as will cause excessive coughing. Otherwise no dysphagia.     3 episodes of diarrhea since roselyn but typically does ok. No blood or black.    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  She was able to get this removed in 2017 after extensive rehab and significant improvement in symptoms.  She also had trach placement.    Possible GI cancer in paternal grandfather but this is uncertain.  Otherwise no family history of GI malignancies.    Objective   Vital Signs:   /85   Pulse 108   Temp 97.1 °F (36.2 °C)   Ht 154.9 cm (61\")   Wt 87.7 kg (193 lb 6.4 oz)   BMI 36.54 kg/m²       Physical Exam  Vitals reviewed.   Constitutional:       General: She is awake. She is not in acute distress.     Appearance: Normal appearance. She is well-developed and well-groomed.   HENT:      Head: Normocephalic.   Pulmonary:      Effort: Pulmonary effort is normal. No respiratory distress.   Skin:     Coloration: Skin is not pale.   Neurological:      Mental Status: She is alert and oriented to person, place, and time.      Gait: Gait is intact.   Psychiatric:         Mood and Affect: Mood and affect normal.         " Speech: Speech normal.         Behavior: Behavior is cooperative.         Judgment: Judgment normal.          Result Review :             Assessment and Plan    Diagnoses and all orders for this visit:    1. Dyspepsia (Primary)  -     Case Request; Standing  -     Implement Anesthesia orders day of procedure.; Standing  -     Obtain Informed Consent; Standing  -     Case Request  -     H. Pylori Breath Test - Breath, Lung    2. Gastroesophageal reflux disease, unspecified whether esophagitis present  -     Case Request; Standing  -     Implement Anesthesia orders day of procedure.; Standing  -     Obtain Informed Consent; Standing  -     Case Request  -     H. Pylori Breath Test - Breath, Lung    Other orders  -     famotidine (PEPCID) 40 MG tablet; Take 1 tablet by mouth Daily.  Dispense: 30 tablet; Refill: 5    Recommend proceeding with H. pylori breath test.  Start Pepcid 40 mg daily.  The waterbrash and reflux symptoms have been reported previously in 2017.  Dyspepsia a little more recent.  Given this would recommend proceeding with EGD for further evaluation.    Hx of tracheostomy so will plan for EGD at hospital given possible scar tissue.     She admits to significant environmental allergies and follows with [facility].     Follow Up   Return for EGD.    Dragon dictation used throughout this note.     Jessica Bonilla PA-C

## 2024-01-16 LAB — UREA BREATH TEST QL: NEGATIVE

## 2024-02-06 ENCOUNTER — ANESTHESIA (OUTPATIENT)
Dept: GASTROENTEROLOGY | Facility: HOSPITAL | Age: 29
End: 2024-02-06
Payer: COMMERCIAL

## 2024-02-06 ENCOUNTER — HOSPITAL ENCOUNTER (OUTPATIENT)
Facility: HOSPITAL | Age: 29
Setting detail: HOSPITAL OUTPATIENT SURGERY
Discharge: HOME | End: 2024-02-06
Attending: INTERNAL MEDICINE | Admitting: INTERNAL MEDICINE
Payer: COMMERCIAL

## 2024-02-06 ENCOUNTER — ANESTHESIA EVENT (OUTPATIENT)
Dept: GASTROENTEROLOGY | Facility: HOSPITAL | Age: 29
End: 2024-02-06
Payer: COMMERCIAL

## 2024-02-06 VITALS
HEART RATE: 100 BPM | BODY MASS INDEX: 36.25 KG/M2 | DIASTOLIC BLOOD PRESSURE: 81 MMHG | HEIGHT: 61 IN | WEIGHT: 192 LBS | OXYGEN SATURATION: 97 % | RESPIRATION RATE: 16 BRPM | SYSTOLIC BLOOD PRESSURE: 124 MMHG

## 2024-02-06 DIAGNOSIS — R10.13 DYSPEPSIA: ICD-10-CM

## 2024-02-06 DIAGNOSIS — K21.9 GASTROESOPHAGEAL REFLUX DISEASE, UNSPECIFIED WHETHER ESOPHAGITIS PRESENT: ICD-10-CM

## 2024-02-06 PROCEDURE — 25010000002 PROPOFOL 10 MG/ML EMULSION

## 2024-02-06 PROCEDURE — 25810000003 LACTATED RINGERS PER 1000 ML: Performed by: INTERNAL MEDICINE

## 2024-02-06 PROCEDURE — 25010000002 GLYCOPYRROLATE 0.2 MG/ML SOLUTION

## 2024-02-06 RX ORDER — PROPOFOL 10 MG/ML
VIAL (ML) INTRAVENOUS AS NEEDED
Status: DISCONTINUED | OUTPATIENT
Start: 2024-02-06 | End: 2024-02-06 | Stop reason: SURG

## 2024-02-06 RX ORDER — LIDOCAINE HYDROCHLORIDE 20 MG/ML
INJECTION, SOLUTION INFILTRATION; PERINEURAL AS NEEDED
Status: DISCONTINUED | OUTPATIENT
Start: 2024-02-06 | End: 2024-02-06 | Stop reason: SURG

## 2024-02-06 RX ORDER — GLYCOPYRROLATE 0.2 MG/ML
INJECTION INTRAMUSCULAR; INTRAVENOUS AS NEEDED
Status: DISCONTINUED | OUTPATIENT
Start: 2024-02-06 | End: 2024-02-06 | Stop reason: SURG

## 2024-02-06 RX ORDER — SODIUM CHLORIDE 0.9 % (FLUSH) 0.9 %
10 SYRINGE (ML) INJECTION AS NEEDED
Status: DISCONTINUED | OUTPATIENT
Start: 2024-02-06 | End: 2024-02-06 | Stop reason: HOSPADM

## 2024-02-06 RX ORDER — SODIUM CHLORIDE, SODIUM LACTATE, POTASSIUM CHLORIDE, CALCIUM CHLORIDE 600; 310; 30; 20 MG/100ML; MG/100ML; MG/100ML; MG/100ML
1000 INJECTION, SOLUTION INTRAVENOUS CONTINUOUS
Status: DISCONTINUED | OUTPATIENT
Start: 2024-02-06 | End: 2024-02-06 | Stop reason: HOSPADM

## 2024-02-06 RX ADMIN — LIDOCAINE HYDROCHLORIDE 50 MG: 20 INJECTION, SOLUTION INFILTRATION; PERINEURAL at 07:44

## 2024-02-06 RX ADMIN — GLYCOPYRROLATE 0.2 MG: 0.2 INJECTION INTRAMUSCULAR; INTRAVENOUS at 07:34

## 2024-02-06 RX ADMIN — PROPOFOL 200 MCG/KG/MIN: 10 INJECTION, EMULSION INTRAVENOUS at 07:45

## 2024-02-06 RX ADMIN — SODIUM CHLORIDE, POTASSIUM CHLORIDE, SODIUM LACTATE AND CALCIUM CHLORIDE 1000 ML: 600; 310; 30; 20 INJECTION, SOLUTION INTRAVENOUS at 07:21

## 2024-02-06 RX ADMIN — PROPOFOL 100 MG: 10 INJECTION, EMULSION INTRAVENOUS at 07:44

## 2024-02-06 NOTE — ANESTHESIA POSTPROCEDURE EVALUATION
Patient: Juli Luna    Procedure Summary       Date: 02/06/24 Room / Location:  [facility]    Anesthesia Start: 0730 Anesthesia Stop: 0757    Procedure: ESOPHAGOGASTRODUODENOSCOPY (Esophagus) Diagnosis:       Dyspepsia      Gastroesophageal reflux disease, unspecified whether esophagitis present      (Dyspepsia [R10.13])      (Gastroesophageal reflux disease, unspecified whether esophagitis present [K21.9])    Surgeons: René Ritter MD Provider: Jared Gilbert MD    Anesthesia Type: MAC ASA Status: 2            Anesthesia Type: MAC    Vitals  Vitals Value Taken Time   /77 02/06/24 0756   Temp     Pulse 101 02/06/24 0803   Resp 16 02/06/24 0756   SpO2 97 % 02/06/24 0803   Vitals shown include unfiled device data.        Post Anesthesia Care and Evaluation    Patient location during evaluation: PHASE II  Patient participation: waiting for patient participation  Level of consciousness: sleepy but conscious  Pain management: adequate    Airway patency: patent    Cardiovascular status: acceptable  Respiratory status: acceptable  Hydration status: acceptable

## 2024-02-06 NOTE — ANESTHESIA PREPROCEDURE EVALUATION
Anesthesia Evaluation     Patient summary reviewed and Nursing notes reviewed   no history of anesthetic complications:   NPO Solid Status: > 8 hours  NPO Liquid Status: > 2 hours           Airway   Mallampati: II  TM distance: >3 FB  Neck ROM: full  Dental      Pulmonary    Cardiovascular         Neuro/Psych    ROS Comment: Patient has a history of AVM/CVA. Had trach for 2 months but has fully recovered with only minimal right sides weakness  GI/Hepatic/Renal/Endo    (+) obesity, GERD    Musculoskeletal     Abdominal    Substance History      OB/GYN          Other                    Anesthesia Plan    ASA 2     MAC     intravenous induction     Anesthetic plan, risks, benefits, and alternatives have been provided, discussed and informed consent has been obtained with: patient.    CODE STATUS:

## 2024-02-08 ENCOUNTER — TELEPHONE (OUTPATIENT)
Dept: NEUROSURGERY | Facility: CLINIC | Age: 29
End: 2024-02-08
Payer: COMMERCIAL

## 2024-02-08 NOTE — TELEPHONE ENCOUNTER
Have You Had Previous Treatments With Pdt Before?: has had previous treatments Recall letter has been mailed to patient    When Outside In The Sun, Do You...: mostly burns, rarely tans

## 2024-09-24 RX ORDER — FAMOTIDINE 40 MG/1
40 TABLET, FILM COATED ORAL DAILY
Qty: 90 TABLET | Refills: 0 | Status: SHIPPED | OUTPATIENT
Start: 2024-09-24

## 2025-01-13 ENCOUNTER — TELEPHONE (OUTPATIENT)
Dept: NEUROSURGERY | Facility: CLINIC | Age: 30
End: 2025-01-13
Payer: COMMERCIAL

## 2025-01-13 DIAGNOSIS — Z98.890 H/O CRANIOTOMY: ICD-10-CM

## 2025-01-13 DIAGNOSIS — Q28.2 ARTERIOVENOUS MALFORMATION OF BRAIN: ICD-10-CM

## 2025-01-13 DIAGNOSIS — R26.9 ABNORMALITY OF GAIT: Primary | ICD-10-CM

## 2025-01-13 NOTE — TELEPHONE ENCOUNTER
Caller: Juli Luna    Relationship: Self    Best call back number: [phone]    What orders are you requesting (i.e. lab or imaging): MRI Brain With & Without Contrast; Future     In what timeframe would the patient need to come in: ASAP    Where will you receive your lab/imaging services: [facility]    Additional notes: PATIENT CALLED AND IS NEEDING TO MAKE HER 3 YEAR FOLLOW UP-PATIENT STATES SHE IS NEEDING IMAGING BEFORE HER APPT. THERE WAS NOT AN ORDER IN CHART-SENDING TO OFFICE TO ADVISE OF CALL THANK YOU

## 2025-01-13 NOTE — TELEPHONE ENCOUNTER
New MRI order entered. Follow up appointment due in March 2025,March follow up made after MRI is done.

## 2025-02-03 ENCOUNTER — TELEPHONE (OUTPATIENT)
Dept: NEUROSURGERY | Facility: CLINIC | Age: 30
End: 2025-02-03
Payer: COMMERCIAL

## 2025-02-03 NOTE — TELEPHONE ENCOUNTER
I called patient left vm. Just letting patient know she will need to have MRI completed 5 days prior to follow up 3/19/25 with Dr. Anne.     (Ok for Hub to read this message to patient).

## 2025-02-04 ENCOUNTER — TELEPHONE (OUTPATIENT)
Dept: NEUROSURGERY | Facility: CLINIC | Age: 30
End: 2025-02-04
Payer: COMMERCIAL

## 2025-02-04 NOTE — TELEPHONE ENCOUNTER
Caller: JACY    Relationship: SELF    Best call back number: [phone]    What was the call regarding: PATIENT CALLED STATING HER MRI IS SCHEDULED 03.18.25 @ 415 PM @  [facility] - PATIENTS APPT WITH DR HERZOG HAS BEEN RESCHEDULED TO 03.24.25 @ 3 PM    PATIENT IS ASKING FOR VALIUM FOR MRI     PLEASE SEND PRESCRIPTION TO   [facility] [place], [place] - [address] [facility] - [phone]  - [phone] [address] [address]  Phone: [phone]  Fax: [phone]       THANK YOU

## 2025-02-04 NOTE — TELEPHONE ENCOUNTER
I called patient let her know she will need to call  to appointment time so we can sent to the pharmacy. Patient voiced understanding.

## 2025-02-06 RX ORDER — FAMOTIDINE 40 MG/1
40 TABLET, FILM COATED ORAL DAILY
Qty: 90 TABLET | Refills: 0 | OUTPATIENT
Start: 2025-02-06

## 2025-02-10 NOTE — TELEPHONE ENCOUNTER
Caller: Juli Luna    Relationship: Self    Best call back number: [phone]    Requested Prescriptions:   Requested Prescriptions     Pending Prescriptions Disp Refills    famotidine (PEPCID) 40 MG tablet 90 tablet 0     Sig: Take 1 tablet by mouth Daily.        Pharmacy where request should be sent:    [facility]    Last office visit with prescribing clinician: 1/15/2024   Last telemedicine visit with prescribing clinician: Visit date not found   Next office visit with prescribing clinician: 5/6/2025     Additional details provided by patient: HAS BEEN OUT OF HER MEDICATION FOR A WEEK.  THE EARLIEST WE COULD SCHEDULE HER FOR AN APPT IS MAY 6TH.   CAN YOU SEND REFILLS TO GET HER THROUGH UNTIL THAT APPT?    Does the patient have less than a 3 day supply:  [x] Yes  [] No    Would you like a call back once the refill request has been completed: [x] Yes [] No    If the office needs to give you a call back, can they leave a voicemail: [x] Yes [] No

## 2025-02-12 RX ORDER — FAMOTIDINE 40 MG/1
40 TABLET, FILM COATED ORAL DAILY
Qty: 90 TABLET | Refills: 0 | Status: SHIPPED | OUTPATIENT
Start: 2025-02-12

## 2025-03-10 ENCOUNTER — TELEPHONE (OUTPATIENT)
Dept: NEUROSURGERY | Facility: CLINIC | Age: 30
End: 2025-03-10
Payer: COMMERCIAL

## 2025-03-10 DIAGNOSIS — F40.240 CLAUSTROPHOBIA: Primary | ICD-10-CM

## 2025-03-10 RX ORDER — DIAZEPAM 5 MG/1
5 TABLET ORAL ONCE
Qty: 1 TABLET | Refills: 0 | Status: SHIPPED | OUTPATIENT
Start: 2025-03-10 | End: 2025-03-10

## 2025-03-10 NOTE — TELEPHONE ENCOUNTER
Eladia called in asking DR. Anne for valium for MRI on 3/18. Please send to [facility].

## 2025-03-18 ENCOUNTER — HOSPITAL ENCOUNTER (OUTPATIENT)
Dept: MRI IMAGING | Facility: HOSPITAL | Age: 30
Discharge: HOME | End: 2025-03-18
Admitting: NEUROLOGICAL SURGERY
Payer: COMMERCIAL

## 2025-03-18 DIAGNOSIS — Z98.890 H/O CRANIOTOMY: ICD-10-CM

## 2025-03-18 DIAGNOSIS — R26.9 ABNORMALITY OF GAIT: ICD-10-CM

## 2025-03-18 DIAGNOSIS — Q28.2 ARTERIOVENOUS MALFORMATION OF BRAIN: ICD-10-CM

## 2025-03-18 PROCEDURE — 25510000002 GADOBENATE DIMEGLUMINE 529 MG/ML SOLUTION: Performed by: NEUROLOGICAL SURGERY

## 2025-03-18 PROCEDURE — 70553 MRI BRAIN STEM W/O & W/DYE: CPT

## 2025-03-18 PROCEDURE — A9577 INJ MULTIHANCE: HCPCS | Performed by: NEUROLOGICAL SURGERY

## 2025-03-18 RX ADMIN — GADOBENATE DIMEGLUMINE 17 ML: 529 INJECTION, SOLUTION INTRAVENOUS at 16:37

## 2025-03-18 NOTE — PROGRESS NOTES
Subjective   History of Present Illness: Juli Luna is a 29 y.o. female is here today for follow-up on a cavernous malformation. MRI brain was done on 3/18/25.  She is doing well today.  No new complaints.  Denies any changes in the frequency or severity of her headaches.  Denies any changes in vision.  Denies any strokelike episodes.  Denies any changes in strength or sensation.      History of Present Illness    The following portions of the patient's history were reviewed and updated as appropriate: allergies, current medications, past family history, past medical history, past social history, past surgical history, and problem list.    Past Medical History:   Diagnosis Date    AVM (arteriovenous malformation)     GERD (gastroesophageal reflux disease)     History of pneumonia     History of tracheostomy 2016    HEALED STOMA    Snoring     SLEEP  STUDY TO BE SCHEDULED        Past Surgical History:   Procedure Laterality Date    ABDOMINAL SURGERY  sep 2016    g tube placement    CRANIOTOMY FOR TUMOR Left 09/07/2016    Procedure: Posterior fossa suboccipital craniotomy and removal of brain stem arteriovenous malformation;  Surgeon: Josias Regalado MD;  Location: [facility];  Service:     ENDOSCOPY N/A 2/6/2024    Procedure: ESOPHAGOGASTRODUODENOSCOPY;  Surgeon: René Ritter MD;  Location: [facility] ENDOSCOPY;  Service: Gastroenterology;  Laterality: N/A;  pre: GERD  post: normal    ENDOSCOPY W/ PEG TUBE PLACEMENT N/A 09/03/2016    Procedure: ESOPHAGOGASTRODUODENOSCOPY WITH PERCUTANEOUS ENDOSCOPIC GASTROSTOMY TUBE INSERTION;  Surgeon: René Ritter MD;  Location: [facility] ENDOSCOPY;  Service:     TRACHEOSTOMY N/A 09/08/2016    Procedure: TRACHEOSTOMY;  Surgeon: Chad Negron MD;  Location: [facility] OR;  Service:     UPPER GASTROINTESTINAL ENDOSCOPY  sep 2016    had a scope when they put in a feeding tube (g-tube) not sure what type          Current Outpatient Medications:     acetaminophen (TYLENOL)  500 MG tablet, Take 1 tablet by mouth Every 6 (Six) Hours As Needed for Mild Pain., Disp: , Rfl:     amantadine (SYMMETREL) 100 MG tablet, Take 1 tablet by mouth 2 (Two) Times a Day., Disp: , Rfl:     clindamycin (CLEOCIN T) 1 % swab, , Disp: , Rfl:     famotidine (PEPCID) 40 MG tablet, Take 1 tablet by mouth Daily., Disp: 90 tablet, Rfl: 0    Patricia Fe 1.5/30 1.5-30 MG-MCG tablet, , Disp: , Rfl:     Loratadine-Pseudoephedrine (CLARITIN-D 24 HOUR PO), Take  by mouth As Needed., Disp: , Rfl:     montelukast (SINGULAIR) 10 MG tablet, Take 1 tablet by mouth Every Night., Disp: 30 tablet, Rfl: 0    multivitamin with minerals tablet tablet, Take 1 tablet by mouth Daily., Disp: , Rfl:     Retin-A 0.025 % cream, , Disp: , Rfl:     spironolactone (ALDACTONE) 100 MG tablet, Take 1 tablet by mouth 2 (Two) Times a Day., Disp: , Rfl:     vitamin B-12 (CYANOCOBALAMIN) 1000 MCG tablet, Take 1 tablet by mouth Daily., Disp: , Rfl:      No Known Allergies     Social History     Socioeconomic History    Marital status: Single   Tobacco Use    Smoking status: Never    Smokeless tobacco: Never   Vaping Use    Vaping status: Never Used   Substance and Sexual Activity    Alcohol use: Yes     Alcohol/week: 3.0 standard drinks of alcohol     Types: 1 Glasses of wine, 2 Drinks containing 0.5 oz of alcohol per week     Comment: may have a drink or a few a few times a month, socially    Drug use: No    Sexual activity: Never        Family History   Problem Relation Age of Onset    Diabetes Mother     Hypertension Mother     Heart disease Maternal Uncle     Heart disease Maternal Grandmother     Hypertension Maternal Grandmother     Heart disease Maternal Grandfather     Diabetes Maternal Grandfather     Diabetes Other     Hypertension Other     Malig Hyperthermia Neg Hx         Review of Systems   Neurological:  Negative for dizziness, weakness, light-headedness and headaches.   All other systems reviewed and are negative.      Objective  "    Vitals:    25 1516   BP: 112/60   Pulse: 100   Temp: 97.5 °F (36.4 °C)   SpO2: 99%   Weight: 91.4 kg (201 lb 9.6 oz)   Height: 154.9 cm (61\")     Body mass index is 38.09 kg/m².      Physical Exam  Awake, alert, oriented x3  Pupils equal round reactive to light  Extraocular muscles intact  Face symmetric  Speech is fluent and clear  No pronator drift  Motor exam  Bilateral deltoids 5/5, bilateral biceps 5/5, bilateral triceps 5/5, bilateral wrist extension 5/5 bilateral hand  5/5  Bilateral hip flexion 5/5, bilateral knee extension 5/5, bilateral DF/PF 5/5  No clonus  No Dickson's reflex  Steady normal gait  Able to walk heel-to-toe with moderate difficulty  Able to detect  light touch in all 4 extremities        Assessment & Plan   Independent Review of Radiographic Studies:      I personally reviewed the images from the following studies.  MRI brain with and without contrast from 2025 and March 3, 2022  The follow-up MRI images show no change.  No evidence of new hemorrhage.  No new lesions.    Medical Decision Makin-year-old female s/p suboccipital craniectomy for Telovelar approach for evacuation of a left medullary hemorrhage and suspected cavernous malformation  -She has recovered very well from her surgery and from her hemorrhage.  She reports she still has some instability while walking and would like to continue to do physical therapy.  She is requesting a referral for her gait instability  -I will plan to see her back in 3 years with a repeat MRI to evaluate her progress.      Diagnoses and all orders for this visit:    1. Abnormality of gait (Primary)  -     Ambulatory Referral to Physical Therapy for Evaluation & Treatment    2. H/O craniotomy  -     MRI Brain With & Without Contrast; Future  -     Ambulatory Referral to Physical Therapy for Evaluation & Treatment    3. Cavernous malformation  -     MRI Brain With & Without Contrast; Future      Return in about 1 year " (around 3/24/2026).    I spent 30 minutes reviewing the medical record, reviewing the MRI images, discussing the management of cavernous malformation

## 2025-03-24 ENCOUNTER — OFFICE VISIT (OUTPATIENT)
Dept: NEUROSURGERY | Facility: CLINIC | Age: 30
End: 2025-03-24
Payer: COMMERCIAL

## 2025-03-24 VITALS
OXYGEN SATURATION: 99 % | SYSTOLIC BLOOD PRESSURE: 112 MMHG | DIASTOLIC BLOOD PRESSURE: 60 MMHG | HEART RATE: 100 BPM | WEIGHT: 201.6 LBS | HEIGHT: 61 IN | TEMPERATURE: 97.5 F | BODY MASS INDEX: 38.06 KG/M2

## 2025-03-24 DIAGNOSIS — Q28.3 CAVERNOUS MALFORMATION: ICD-10-CM

## 2025-03-24 DIAGNOSIS — R26.9 ABNORMALITY OF GAIT: Primary | ICD-10-CM

## 2025-03-24 DIAGNOSIS — Z98.890 H/O CRANIOTOMY: ICD-10-CM

## 2025-03-24 PROCEDURE — 99203 OFFICE O/P NEW LOW 30 MIN: CPT | Performed by: NEUROLOGICAL SURGERY

## 2025-04-02 ENCOUNTER — TREATMENT (OUTPATIENT)
Dept: PHYSICAL THERAPY | Facility: CLINIC | Age: 30
End: 2025-04-02
Payer: COMMERCIAL

## 2025-04-02 DIAGNOSIS — G89.29 CHRONIC BILATERAL LOW BACK PAIN WITHOUT SCIATICA: Primary | ICD-10-CM

## 2025-04-02 DIAGNOSIS — M54.50 CHRONIC BILATERAL LOW BACK PAIN WITHOUT SCIATICA: Primary | ICD-10-CM

## 2025-04-02 NOTE — PROGRESS NOTES
Physical Therapy Initial Evaluation and Plan of Care  [facility] [address], [address]  [place] [address]     Patient: Juli Luna   : 1995  Referring practitioner: Bola Kaye MD  Date of Initial Visit: 2025  Today's Date: 2025  Patient seen for 1 sessions  PT Clinic location: [address], [address]  [id number]          Visit Diagnoses:    ICD-10-CM ICD-9-CM   1. Chronic bilateral low back pain without sciatica  M54.50 724.2    G89.29 338.29       Subjective Questionnaire: Back Index: 1350, 26%    Subjective Evaluation    History of Present Illness  Mechanism of injury: The patient is a 30 y.o. female presenting to today's evaluation with back pain. She reports onset of pain about 1.5 months ago that comes and goes. She notes pain in her low back that travels across the belt line. She notes pain in the morning that gets progressively worse throughout the day. When her pain is flared up it stays flared up regardless of her position. She had back pain like this in the past and states this feels familiar. Denies any pain in her legs. She does get numbness and tingling in her hands secondary to carpal tunnel. Denies LE numbness or tingling. She takes ibuprofen and tylenol which help. She returns to the MD in 6 weeks.       Aggravating factors: none noted  Alleviating factors: tylenol and ibuprofen  Imaging:       Occupation:  at [employer]   Prior level of function: independent without difficulty    Current Activity/Functional limitations:  She notes difficulty with bending and lifting. She also notes difficulty with prolonged standing. Denies any difficulty with walking. She is able to walk her dog without issue. Denies issues with sleeping. She is able to do her usual activities but they are painful. She sometimes has pain at work as she has to be on her feet. She does pilates  once a week.         Pain  Current pain ratin  At best pain ratin (with medication)  At worst pain ratin (with very active days)    Patient Goals  Patient goals for therapy: decreased pain, increased strength and increased motion  Patient goal: improved flexibility       Medical history: AVM, GERD. See chart for further detail.         Objective          Postural Observations    Additional Postural Observation Details  POSTURE: R scapular depression, genu valgus, increased lumbar lordosis, LE hyperextension;   PALPATION: denies tenderness throughout the lumbar region, states pain is usually along the belt line  GAIT: B hip drop, genu valgus B, over pronation B;     Active Range of Motion     Lumbar   Flexion: 80 degrees   Extension: Active lumbar extension: pain* in low back. WFL  Left lateral flexion: WFL  Right lateral flexion: WFL  Left rotation: WFL  Right rotation: WFL    Additional Active Range of Motion Details  Lumbar ROM measured in % not degrees    Strength/Myotome Testing     Left Hip   Planes of Motion   Flexion: 5  Abduction: 3+  External rotation: 4  Internal rotation: 4    Right Hip   Planes of Motion   Flexion: 5  Abduction: 3+  External rotation: 3+  Internal rotation: 4    Left Knee   Flexion: 4+  Extension: 5    Right Knee   Flexion: 4+  Extension: 5 (pain in R knee)    Left Ankle/Foot   Dorsiflexion: 5    Right Ankle/Foot   Dorsiflexion: 5    Additional Strength Details  Upper abdominals: 2, no pain, difficult  Lower abdominals: 3+          Assessment & Plan       Assessment  Impairments: abnormal gait, abnormal or restricted ROM, activity intolerance, impaired balance, impaired physical strength, lacks appropriate home exercise program, pain with function and safety issue   Functional limitations: carrying objects, walking, standing and reaching overhead   Assessment details: The patient is a 30 y.o. female who presents to physical therapy today for back pain. Upon initial  evaluation, the patient demonstrates the following impairments: LE weakness, core weakness, impaired postural alignment, and impaired gait mechanics.     Due to these impairments, the patient is unable to perform or has difficulty with the following functional tasks: prolonged standing, bending over, and lifting. The patient would benefit from skilled PT services to address functional limitations and impairments and to improve patient quality of life.      Prognosis: good    Goals  Plan Goals: ST. Pt will be independent and compliant with initial HEP in 3 weeks.  2. Pt will report a 20% improvement in symptoms since starting therapy in 3 weeks.  3. Pt will report pain level at worst <6 with prolonged standing in 3 weeks.  4. Pt will show improvement in body mechanics when lifting and sitting in 2 weeks in order to decrease strain on back.   LT. Pt will be independent with final HEP for self-management of condition by DC.  2. Pt will improve score on Back Index to less than 20% by DC.   3. Pt will report a 50% improvement in symptoms by DC in order to allow return to PLOF.  4. Pt will improve LE strength to 4+/5 or greater in all planes to demonstrate improved function with prolonged standing.       Plan  Therapy options: will be seen for skilled therapy services  Planned modality interventions: cryotherapy and thermotherapy (hydrocollator packs)  Planned therapy interventions: abdominal trunk stabilization, ADL retraining, balance/weight-bearing training, body mechanics training, flexibility, functional ROM exercises, gait training, home exercise program, joint mobilization, manual therapy, motor coordination training, neuromuscular re-education, postural training, soft tissue mobilization, spinal/joint mobilization, strengthening, stretching, therapeutic activities and transfer training  Frequency: 2x week  Duration in weeks: 6  Treatment plan discussed with: patient      Access Code: [email or web address]  URL:  [email or web address]  Date: 04/02/2025  Prepared by: Nelly Gamino       See flowsheets for treatment detail.      History # of Personal Factors and/or Comorbidities: LOW (0)  Examination of Body System(s): # of elements: LOW (1-2)  Clinical Presentation: STABLE   Clinical Decision Making: LOW       Timed:         Manual Therapy:         mins  09620;     Therapeutic Exercise:    10     mins  78528;     Neuromuscular Aspen:        mins  02085;    Therapeutic Activity:          mins  13357;     Gait Training:           mins  79085;     Ionto                                   mins   35852  Self Care                       10     mins   27954      Un-Timed:  Electrical Stimulation:         mins  47200 ( );  Dry Needling          mins self-pay  Traction          mins 08106  Low Eval     20     Mins  00956  Mod Eval          Mins  64597  High Eval                            Mins  43720  Re-Eval                               mins  95768      Timed Treatment:   20   mins   Total Treatment:     40   mins    PT SIGNATURE: Nelly Gamino, PT   [place] PT license #: [id number]  DATE TREATMENT INITIATED: 4/2/2025    Initial Certification  Certification Period: 6/30/2025  I certify that the therapy services are furnished while this patient is under my care.  The services outlined above are required by this patient, and will be reviewed every 90 days.    PHYSICIAN: Bola Kaye MD  NPI: [id number]                                      DATE:     Please sign and return via fax to [place] - Fax #: [phone]. Thank you, [facility].

## 2025-04-18 ENCOUNTER — RESULTS FOLLOW-UP (OUTPATIENT)
Dept: URGENT CARE | Facility: CLINIC | Age: 30
End: 2025-04-18
Payer: COMMERCIAL

## 2025-04-24 ENCOUNTER — TREATMENT (OUTPATIENT)
Dept: PHYSICAL THERAPY | Facility: CLINIC | Age: 30
End: 2025-04-24
Payer: COMMERCIAL

## 2025-04-24 DIAGNOSIS — M54.50 CHRONIC BILATERAL LOW BACK PAIN WITHOUT SCIATICA: Primary | ICD-10-CM

## 2025-04-24 DIAGNOSIS — G89.29 CHRONIC BILATERAL LOW BACK PAIN WITHOUT SCIATICA: Primary | ICD-10-CM

## 2025-04-24 PROCEDURE — 97110 THERAPEUTIC EXERCISES: CPT

## 2025-04-24 PROCEDURE — 97112 NEUROMUSCULAR REEDUCATION: CPT

## 2025-04-24 NOTE — PROGRESS NOTES
Physical Therapy Daily Progress Note                                            [address], [address]                                                           [place], [place]                                                         [phone]    Patient: Juli Luna   : 1995  Diagnosis/ICD-10 Code:  Chronic bilateral low back pain without sciatica [M54.50, G89.29]  Referring practitioner: Bola Kaye MD  Date of Initial Visit: Type: THERAPY  Noted: 2025  Today's Date: 2025  Patient seen for 2 sessions           Subjective Evaluation    History of Present Illness    Subjective comment: Juli reports that she continues to experience low back pain and tightness. It has been more on the R side this week       Objective   See Exercise, Manual, and Modality Logs for complete treatment.       Assessment & Plan       Assessment  Assessment details: Today's session was focused on lumbar stretching, abdominal strengthening, and glut strengthening. Juli tolerated all exercises well without excessive pain through her low back. She demonstrates deficits in her abdominal strength through visible shakiness and inability to maintain a posterior pelvic tilt/ flat back on the table through full range of motion when performing exercises that involve hip extension. She was educated on the importance of decreasing her range of motion to avoid excessive strain on her lumbar spine. Her HEP was updated to include more progressive strengthening. Plan to continue progressing all exercises as tolerated.         Progress per Plan of Care           Manual Therapy:    0     mins  84015;  Therapeutic Exercise:    27     mins  17542;     Neuromuscular Aspen:    10    mins  73861;    Therapeutic Activity:     0     mins  54747;     Gait Trainin     mins  37111;     Ultrasound:     0     mins  80434;    Electrical Stimulation:    0     mins  54677 ( );  Dry Needling     0     mins  self-pay    Timed Treatment:   37   mins   Total Treatment:     37   mins    Sulaiman Daniels PT, DPT  Physical Therapist  [place] License #: [id number]  Sulaiman Daniels PT, 04/24/25, 2:59 PM EDT

## 2025-04-28 ENCOUNTER — TREATMENT (OUTPATIENT)
Dept: PHYSICAL THERAPY | Facility: CLINIC | Age: 30
End: 2025-04-28
Payer: COMMERCIAL

## 2025-04-28 DIAGNOSIS — G89.29 CHRONIC BILATERAL LOW BACK PAIN WITHOUT SCIATICA: Primary | ICD-10-CM

## 2025-04-28 DIAGNOSIS — M54.50 CHRONIC BILATERAL LOW BACK PAIN WITHOUT SCIATICA: Primary | ICD-10-CM

## 2025-04-28 PROCEDURE — 97110 THERAPEUTIC EXERCISES: CPT

## 2025-04-28 PROCEDURE — 97112 NEUROMUSCULAR REEDUCATION: CPT

## 2025-04-28 NOTE — PROGRESS NOTES
Physical Therapy Daily Progress Note                                            [address], [address]                                                           [place], [place]                                                         [phone]    Patient: Juli Luna   : 1995  Diagnosis/ICD-10 Code:  Chronic bilateral low back pain without sciatica [M54.50, G89.29]  Referring practitioner: Bola Kaye MD  Date of Initial Visit: Type: THERAPY  Noted: 2025  Today's Date: 2025  Patient seen for 3 sessions           Subjective Evaluation    History of Present Illness    Subjective comment: Juli reports that she has been feeling okay. She had a busy weekend and was not able to perform her HEP       Objective   See Exercise, Manual, and Modality Logs for complete treatment.       Assessment & Plan       Assessment  Assessment details: Today's session was tolerated very well. Juli had minimal reports of low back pain. She continues to have slight difficulty activating her transverse abdominis to maintain a posterior pelvic tilt during core strengthening exercises. With more challenging exercises such as the dead bug, as she became fatigued, she would experience slight discomfort in her low back due to moving into an anterior pelvic tilt. All other exercises were performed without low back pain. Plan to continue progressing all exercises as tolerated.         Progress per Plan of Care           Manual Therapy:    0     mins  45340;  Therapeutic Exercise:    30     mins  77600;     Neuromuscular Aspen:    20    mins  15174;    Therapeutic Activity:     0     mins  18697;     Gait Trainin     mins  26186;     Ultrasound:     0     mins  97701;    Electrical Stimulation:    0     mins  57210 ( );  Dry Needling     0     mins self-pay    Timed Treatment:   50   mins   Total Treatment:     50   mins    Sulaiman Daniels PT, DPT  Physical Therapist  [place] License #:  [id number]  Sulaiman Daniels, PT, 04/28/25, 3:33 PM EDT

## 2025-05-05 ENCOUNTER — TELEPHONE (OUTPATIENT)
Dept: PHYSICAL THERAPY | Facility: CLINIC | Age: 30
End: 2025-05-05

## (undated) DEVICE — TBG SXN CONN/F UNIV 1/4IN 10FT LF STRL

## (undated) DEVICE — KT ORCA ORCAPOD DISP STRL

## (undated) DEVICE — CANN O2 ETCO2 FITS ALL CONN CO2 SMPL A/ 7IN DISP LF

## (undated) DEVICE — SENSR O2 OXIMAX FNGR A/ 18IN NONSTR

## (undated) DEVICE — LN SMPL CO2 SHTRM SD STREAM W/M LUER

## (undated) DEVICE — ADAPT CLN BIOGUARD AIR/H2O DISP

## (undated) DEVICE — BLCK/BITE BLOX W/DENTL/RIM W/STRAP 54F